# Patient Record
Sex: MALE | Race: WHITE | Employment: OTHER | URBAN - METROPOLITAN AREA
[De-identification: names, ages, dates, MRNs, and addresses within clinical notes are randomized per-mention and may not be internally consistent; named-entity substitution may affect disease eponyms.]

---

## 2021-04-09 ENCOUNTER — HOSPITAL ENCOUNTER (OUTPATIENT)
Dept: ULTRASOUND IMAGING | Age: 86
Discharge: HOME | End: 2021-04-11
Payer: MEDICARE

## 2021-04-09 DIAGNOSIS — N18.4 CHRONIC RENAL DISEASE, STAGE IV (HCC): ICD-10-CM

## 2021-04-09 DIAGNOSIS — G45.9 TIA (TRANSIENT ISCHEMIC ATTACK): ICD-10-CM

## 2021-04-09 PROCEDURE — 93880 EXTRACRANIAL BILAT STUDY: CPT

## 2021-04-09 PROCEDURE — 76775 US EXAM ABDO BACK WALL LIM: CPT

## 2021-10-27 ENCOUNTER — APPOINTMENT (OUTPATIENT)
Dept: CT IMAGING | Age: 86
DRG: 291 | End: 2021-10-27
Payer: MEDICARE

## 2021-10-27 ENCOUNTER — HOSPITAL ENCOUNTER (INPATIENT)
Age: 86
LOS: 7 days | Discharge: NURSING, REHAB OR LONG-TERM CARE | DRG: 291 | End: 2021-11-03
Attending: INTERNAL MEDICINE | Admitting: INTERNAL MEDICINE
Payer: MEDICARE

## 2021-10-27 DIAGNOSIS — I50.9 CONGESTIVE HEART FAILURE, UNSPECIFIED HF CHRONICITY, UNSPECIFIED HEART FAILURE TYPE (HCC): Primary | ICD-10-CM

## 2021-10-27 PROBLEM — I50.43 CHF (CONGESTIVE HEART FAILURE), NYHA CLASS I, ACUTE ON CHRONIC, COMBINED (HCC): Status: ACTIVE | Noted: 2021-10-27

## 2021-10-27 LAB
ALBUMIN SERPL-MCNC: 3.3 G/DL (ref 3.5–4.6)
ALP BLD-CCNC: 113 U/L (ref 35–104)
ALT SERPL-CCNC: 10 U/L (ref 0–41)
ANION GAP SERPL CALCULATED.3IONS-SCNC: 9 MEQ/L (ref 9–15)
AST SERPL-CCNC: 15 U/L (ref 0–40)
BASOPHILS ABSOLUTE: 0 K/UL (ref 0–0.2)
BASOPHILS RELATIVE PERCENT: 0.6 %
BILIRUB SERPL-MCNC: 0.7 MG/DL (ref 0.2–0.7)
BUN BLDV-MCNC: 32 MG/DL (ref 8–23)
CALCIUM SERPL-MCNC: 8.6 MG/DL (ref 8.5–9.9)
CHLORIDE BLD-SCNC: 108 MEQ/L (ref 95–107)
CHP ED QC CHECK: YES
CO2: 25 MEQ/L (ref 20–31)
CREAT SERPL-MCNC: 3.45 MG/DL (ref 0.7–1.2)
EOSINOPHILS ABSOLUTE: 0.1 K/UL (ref 0–0.7)
EOSINOPHILS RELATIVE PERCENT: 2.3 %
GFR AFRICAN AMERICAN: 20.6
GFR NON-AFRICAN AMERICAN: 17
GLOBULIN: 2.9 G/DL (ref 2.3–3.5)
GLUCOSE BLD-MCNC: 165 MG/DL
GLUCOSE BLD-MCNC: 165 MG/DL (ref 60–115)
GLUCOSE BLD-MCNC: 171 MG/DL (ref 70–99)
GLUCOSE BLD-MCNC: 190 MG/DL (ref 60–115)
HBA1C MFR BLD: 6.4 % (ref 4.8–5.9)
HCT VFR BLD CALC: 31.2 % (ref 42–52)
HEMOGLOBIN: 10.9 G/DL (ref 14–18)
LYMPHOCYTES ABSOLUTE: 1 K/UL (ref 1–4.8)
LYMPHOCYTES RELATIVE PERCENT: 16.5 %
MAGNESIUM: 2 MG/DL (ref 1.7–2.4)
MAGNESIUM: 2.1 MG/DL (ref 1.7–2.4)
MCH RBC QN AUTO: 32.8 PG (ref 27–31.3)
MCHC RBC AUTO-ENTMCNC: 35.1 % (ref 33–37)
MCV RBC AUTO: 93.3 FL (ref 80–100)
MONOCYTES ABSOLUTE: 0.5 K/UL (ref 0.2–0.8)
MONOCYTES RELATIVE PERCENT: 7.4 %
NEUTROPHILS ABSOLUTE: 4.6 K/UL (ref 1.4–6.5)
NEUTROPHILS RELATIVE PERCENT: 73.2 %
PDW BLD-RTO: 14.8 % (ref 11.5–14.5)
PERFORMED ON: ABNORMAL
PERFORMED ON: ABNORMAL
PLATELET # BLD: 206 K/UL (ref 130–400)
POTASSIUM SERPL-SCNC: 4.3 MEQ/L (ref 3.4–4.9)
PRO-BNP: NORMAL PG/ML
RBC # BLD: 3.34 M/UL (ref 4.7–6.1)
SARS-COV-2, NAAT: NOT DETECTED
SODIUM BLD-SCNC: 142 MEQ/L (ref 135–144)
TOTAL PROTEIN: 6.2 G/DL (ref 6.3–8)
TROPONIN: 0.04 NG/ML (ref 0–0.01)
TROPONIN: 0.05 NG/ML (ref 0–0.01)
WBC # BLD: 6.3 K/UL (ref 4.8–10.8)

## 2021-10-27 PROCEDURE — 87635 SARS-COV-2 COVID-19 AMP PRB: CPT

## 2021-10-27 PROCEDURE — 2580000003 HC RX 258: Performed by: INTERNAL MEDICINE

## 2021-10-27 PROCEDURE — 2060000000 HC ICU INTERMEDIATE R&B

## 2021-10-27 PROCEDURE — 85025 COMPLETE CBC W/AUTO DIFF WBC: CPT

## 2021-10-27 PROCEDURE — 93005 ELECTROCARDIOGRAM TRACING: CPT | Performed by: NURSE PRACTITIONER

## 2021-10-27 PROCEDURE — 83036 HEMOGLOBIN GLYCOSYLATED A1C: CPT

## 2021-10-27 PROCEDURE — 83735 ASSAY OF MAGNESIUM: CPT

## 2021-10-27 PROCEDURE — 84484 ASSAY OF TROPONIN QUANT: CPT

## 2021-10-27 PROCEDURE — 99285 EMERGENCY DEPT VISIT HI MDM: CPT

## 2021-10-27 PROCEDURE — 6370000000 HC RX 637 (ALT 250 FOR IP): Performed by: INTERNAL MEDICINE

## 2021-10-27 PROCEDURE — 96374 THER/PROPH/DIAG INJ IV PUSH: CPT

## 2021-10-27 PROCEDURE — 96375 TX/PRO/DX INJ NEW DRUG ADDON: CPT

## 2021-10-27 PROCEDURE — 6360000002 HC RX W HCPCS: Performed by: INTERNAL MEDICINE

## 2021-10-27 PROCEDURE — 71250 CT THORAX DX C-: CPT

## 2021-10-27 PROCEDURE — 36415 COLL VENOUS BLD VENIPUNCTURE: CPT

## 2021-10-27 PROCEDURE — 99223 1ST HOSP IP/OBS HIGH 75: CPT | Performed by: INTERNAL MEDICINE

## 2021-10-27 PROCEDURE — 6360000002 HC RX W HCPCS: Performed by: NURSE PRACTITIONER

## 2021-10-27 PROCEDURE — 83880 ASSAY OF NATRIURETIC PEPTIDE: CPT

## 2021-10-27 PROCEDURE — 6370000000 HC RX 637 (ALT 250 FOR IP): Performed by: NURSE PRACTITIONER

## 2021-10-27 PROCEDURE — 80053 COMPREHEN METABOLIC PANEL: CPT

## 2021-10-27 RX ORDER — SODIUM CHLORIDE 0.9 % (FLUSH) 0.9 %
5-40 SYRINGE (ML) INJECTION EVERY 12 HOURS SCHEDULED
Status: DISCONTINUED | OUTPATIENT
Start: 2021-10-27 | End: 2021-11-03 | Stop reason: HOSPADM

## 2021-10-27 RX ORDER — INSULIN GLARGINE 100 [IU]/ML
0.15 INJECTION, SOLUTION SUBCUTANEOUS NIGHTLY
Status: DISCONTINUED | OUTPATIENT
Start: 2021-10-27 | End: 2021-11-03 | Stop reason: HOSPADM

## 2021-10-27 RX ORDER — SODIUM CHLORIDE 0.9 % (FLUSH) 0.9 %
5-40 SYRINGE (ML) INJECTION PRN
Status: DISCONTINUED | OUTPATIENT
Start: 2021-10-27 | End: 2021-11-03 | Stop reason: HOSPADM

## 2021-10-27 RX ORDER — ASPIRIN 81 MG/1
81 TABLET, CHEWABLE ORAL DAILY
Status: DISCONTINUED | OUTPATIENT
Start: 2021-10-27 | End: 2021-10-31

## 2021-10-27 RX ORDER — ONDANSETRON 2 MG/ML
4 INJECTION INTRAMUSCULAR; INTRAVENOUS EVERY 6 HOURS PRN
Status: DISCONTINUED | OUTPATIENT
Start: 2021-10-27 | End: 2021-11-03 | Stop reason: HOSPADM

## 2021-10-27 RX ORDER — SODIUM CHLORIDE 9 MG/ML
25 INJECTION, SOLUTION INTRAVENOUS PRN
Status: DISCONTINUED | OUTPATIENT
Start: 2021-10-27 | End: 2021-11-03 | Stop reason: HOSPADM

## 2021-10-27 RX ORDER — ASPIRIN 81 MG/1
81 TABLET, CHEWABLE ORAL ONCE
Status: COMPLETED | OUTPATIENT
Start: 2021-10-27 | End: 2021-10-27

## 2021-10-27 RX ORDER — ONDANSETRON 4 MG/1
4 TABLET, ORALLY DISINTEGRATING ORAL EVERY 8 HOURS PRN
Status: DISCONTINUED | OUTPATIENT
Start: 2021-10-27 | End: 2021-11-03 | Stop reason: HOSPADM

## 2021-10-27 RX ORDER — FUROSEMIDE 10 MG/ML
60 INJECTION INTRAMUSCULAR; INTRAVENOUS ONCE
Status: COMPLETED | OUTPATIENT
Start: 2021-10-27 | End: 2021-10-27

## 2021-10-27 RX ORDER — DEXTROSE MONOHYDRATE 25 G/50ML
12.5 INJECTION, SOLUTION INTRAVENOUS PRN
Status: DISCONTINUED | OUTPATIENT
Start: 2021-10-27 | End: 2021-11-03 | Stop reason: HOSPADM

## 2021-10-27 RX ORDER — HYDRALAZINE HYDROCHLORIDE 20 MG/ML
10 INJECTION INTRAMUSCULAR; INTRAVENOUS ONCE
Status: COMPLETED | OUTPATIENT
Start: 2021-10-27 | End: 2021-10-27

## 2021-10-27 RX ORDER — ATORVASTATIN CALCIUM 40 MG/1
40 TABLET, FILM COATED ORAL NIGHTLY
Status: DISCONTINUED | OUTPATIENT
Start: 2021-10-27 | End: 2021-10-30

## 2021-10-27 RX ORDER — ASPIRIN 81 MG/1
324 TABLET, CHEWABLE ORAL ONCE
Status: DISCONTINUED | OUTPATIENT
Start: 2021-10-27 | End: 2021-10-27

## 2021-10-27 RX ORDER — NICOTINE POLACRILEX 4 MG
15 LOZENGE BUCCAL PRN
Status: DISCONTINUED | OUTPATIENT
Start: 2021-10-27 | End: 2021-11-03 | Stop reason: HOSPADM

## 2021-10-27 RX ORDER — HYDRALAZINE HYDROCHLORIDE 50 MG/1
50 TABLET, FILM COATED ORAL EVERY 12 HOURS SCHEDULED
Status: DISCONTINUED | OUTPATIENT
Start: 2021-10-27 | End: 2021-10-28

## 2021-10-27 RX ORDER — HEPARIN SODIUM 5000 [USP'U]/ML
5000 INJECTION, SOLUTION INTRAVENOUS; SUBCUTANEOUS EVERY 8 HOURS SCHEDULED
Status: DISCONTINUED | OUTPATIENT
Start: 2021-10-27 | End: 2021-10-31

## 2021-10-27 RX ORDER — FUROSEMIDE 10 MG/ML
40 INJECTION INTRAMUSCULAR; INTRAVENOUS EVERY 12 HOURS
Status: DISCONTINUED | OUTPATIENT
Start: 2021-10-27 | End: 2021-10-29

## 2021-10-27 RX ORDER — METOPROLOL TARTRATE 50 MG/1
50 TABLET, FILM COATED ORAL ONCE
Status: COMPLETED | OUTPATIENT
Start: 2021-10-27 | End: 2021-10-27

## 2021-10-27 RX ORDER — HEPARIN SODIUM 5000 [USP'U]/ML
5000 INJECTION, SOLUTION INTRAVENOUS; SUBCUTANEOUS EVERY 8 HOURS SCHEDULED
Status: DISCONTINUED | OUTPATIENT
Start: 2021-10-27 | End: 2021-10-27

## 2021-10-27 RX ORDER — DEXTROSE MONOHYDRATE 50 MG/ML
100 INJECTION, SOLUTION INTRAVENOUS PRN
Status: DISCONTINUED | OUTPATIENT
Start: 2021-10-27 | End: 2021-11-03 | Stop reason: HOSPADM

## 2021-10-27 RX ORDER — ACETAMINOPHEN 325 MG/1
650 TABLET ORAL EVERY 4 HOURS PRN
Status: DISCONTINUED | OUTPATIENT
Start: 2021-10-27 | End: 2021-11-03 | Stop reason: HOSPADM

## 2021-10-27 RX ORDER — CARVEDILOL 3.12 MG/1
3.12 TABLET ORAL 2 TIMES DAILY
Status: DISCONTINUED | OUTPATIENT
Start: 2021-10-27 | End: 2021-10-29

## 2021-10-27 RX ADMIN — ASPIRIN 81 MG: 81 TABLET, CHEWABLE ORAL at 18:34

## 2021-10-27 RX ADMIN — INSULIN GLARGINE 15 UNITS: 100 INJECTION, SOLUTION SUBCUTANEOUS at 21:52

## 2021-10-27 RX ADMIN — HYDRALAZINE HYDROCHLORIDE 50 MG: 50 TABLET, FILM COATED ORAL at 21:52

## 2021-10-27 RX ADMIN — NITROGLYCERIN 0.5 INCH: 20 OINTMENT TOPICAL at 23:55

## 2021-10-27 RX ADMIN — HEPARIN SODIUM 5000 UNITS: 5000 INJECTION INTRAVENOUS; SUBCUTANEOUS at 18:34

## 2021-10-27 RX ADMIN — FUROSEMIDE 60 MG: 10 INJECTION, SOLUTION INTRAMUSCULAR; INTRAVENOUS at 16:42

## 2021-10-27 RX ADMIN — METOPROLOL TARTRATE 50 MG: 50 TABLET, FILM COATED ORAL at 21:52

## 2021-10-27 RX ADMIN — HYDRALAZINE HYDROCHLORIDE 10 MG: 20 INJECTION INTRAMUSCULAR; INTRAVENOUS at 16:39

## 2021-10-27 RX ADMIN — Medication 10 ML: at 21:53

## 2021-10-27 RX ADMIN — ATORVASTATIN CALCIUM 40 MG: 40 TABLET, FILM COATED ORAL at 21:52

## 2021-10-27 RX ADMIN — ASPIRIN 81 MG: 81 TABLET, CHEWABLE ORAL at 21:52

## 2021-10-27 RX ADMIN — CARVEDILOL 3.12 MG: 3.12 TABLET, FILM COATED ORAL at 21:52

## 2021-10-27 RX ADMIN — NITROGLYCERIN 0.5 INCH: 20 OINTMENT TOPICAL at 19:12

## 2021-10-27 ASSESSMENT — ENCOUNTER SYMPTOMS
WHEEZING: 0
BLOOD IN STOOL: 0
CHEST TIGHTNESS: 0
GASTROINTESTINAL NEGATIVE: 1
TROUBLE SWALLOWING: 0
ABDOMINAL PAIN: 0
COLOR CHANGE: 0
CHEST TIGHTNESS: 1
BACK PAIN: 0
CONSTIPATION: 0
VOMITING: 0
DIARRHEA: 0
EYE REDNESS: 0
RHINORRHEA: 0
SORE THROAT: 0
STRIDOR: 0
EYE PAIN: 0
COUGH: 0
EYES NEGATIVE: 1
SHORTNESS OF BREATH: 1
NAUSEA: 0
EYE DISCHARGE: 0
COUGH: 1

## 2021-10-27 ASSESSMENT — PAIN SCALES - GENERAL: PAINLEVEL_OUTOF10: 0

## 2021-10-27 NOTE — H&P
History and Physical  Patient: [facility]ies[facility]  Unit/Bed: 03/03  YOB: 1935  MRN: [id number]  Acct: [de-identified]   Admitting Diagnosis: CHF (congestive heart failure), NYHA class I, acute on chronic, combined ([facility] [I50.43]  Admit Date:  10/27/2021  Hospital Day: 0      Chief Complaint: SOB      History of Present Illness:  SOB x 2 months with progressive worsening LE edea and breathing. difficult to sleep. Denies CP. Feels weak. He is in CHF. He had CABG x5 in [place]. He has recently seen Dr. Abigail Weber from [facility]    His family is in the ER. They sate he has been compliant with his meds. He has not been eating much. He has been more sedentary lately    He was hypoxic initially 89% per ER. He has DM and CKD    EKG:  PMHx:  History reviewed. No pertinent past medical history. PSHx:  History reviewed. No pertinent surgical history. Social Hx:  Social History     Socioeconomic History    Marital status:      Spouse name: None    Number of children: None    Years of education: None    Highest education level: None   Occupational History    None   Tobacco Use    Smoking status: Never Smoker    Smokeless tobacco: Never Used   Substance and Sexual Activity    Alcohol use: Not Currently    Drug use: Not Currently    Sexual activity: Not Currently   Other Topics Concern    None   Social History Narrative    None     Social Determinants of Health     Financial Resource Strain:     Difficulty of Paying Living Expenses:    Food Insecurity:     Worried About Running Out of Food in the Last Year:     Ran Out of Food in the Last Year:    Transportation Needs:     Lack of Transportation (Medical):      Lack of Transportation (Non-Medical):    Physical Activity:     Days of Exercise per Week:     Minutes of Exercise per Session:    Stress:     Feeling of Stress :    Social Connections:     Frequency of Communication with Friends and Family:     Frequency of Social Gatherings with Friends and Family:     Attends Scientology Services:     Active Member of Clubs or Organizations:     Attends Club or Organization Meetings:     Marital Status:    Intimate Partner Violence:     Fear of Current or Ex-Partner:     Emotionally Abused:     Physically Abused:     Sexually Abused:        Family Hx:  History reviewed. No pertinent family history. No Known Allergies    Current Facility-Administered Medications   Medication Dose Route Frequency Provider Last Rate Last Admin    furosemide (LASIX) injection 40 mg  40 mg IntraVENous Q12H Jazmyne Henry MD        aspirin chewable tablet 81 mg  81 mg Oral Daily Jazmyne Henry MD        carvedilol (COREG) tablet 3.125 mg  3.125 mg Oral BID Jazmyne Henry MD        atorvastatin (LIPITOR) tablet 40 mg  40 mg Oral Nightly Jazmyne Henry MD        hydrALAZINE (APRESOLINE) tablet 50 mg  50 mg Oral 2 times per day Jazmyne Henry MD        nitroglycerin (NITRO-BID) 2 % ointment 0.5 inch  0.5 inch Topical 4 times per day Jazmyne Henry MD         No current outpatient medications on file. Review of Systems:   Review of Systems   Constitutional: Negative. Negative for diaphoresis and fatigue. HENT: Negative. Eyes: Negative. Respiratory: Positive for shortness of breath. Negative for cough, chest tightness, wheezing and stridor. Cardiovascular: Positive for leg swelling. Negative for chest pain and palpitations. Gastrointestinal: Negative. Negative for blood in stool and nausea. Genitourinary: Negative. Musculoskeletal: Negative. Skin: Negative. Neurological: Negative. Negative for dizziness, syncope, weakness and light-headedness. Hematological: Negative. Psychiatric/Behavioral: Negative. Physical Examination:    BP (!) 166/89   Pulse 71   Temp 98.9 °F (37.2 °C)   Resp 17   Ht 5' 9\" (1.753 m)   Wt 224 lb (101.6 kg)   SpO2 95%   BMI 33.08 kg/m²    Physical Exam   Constitutional: No distress.  He appears chronically ill and acutely ill. HENT:   Normal cephalic and Atraumatic   Eyes: Pupils are equal, round, and reactive to light. Neck: Thyroid normal. No JVD present. No neck adenopathy. No thyromegaly present. Cardiovascular: Normal rate, regular rhythm, intact distal pulses and normal pulses. Murmur heard. Pulmonary/Chest: Effort normal. He has no wheezes. He has bilateral rales to the midchest. He exhibits no tenderness. Abdominal: Soft. Bowel sounds are normal. There is no abdominal tenderness. Musculoskeletal:         General: Edema (1-2+) present. No tenderness. Normal range of motion. Cervical back: Normal range of motion and neck supple. Neurological: He is alert and oriented to person, place, and time. Skin: Skin is warm. No cyanosis. Nails show no clubbing.          LABS:  CBC:   Lab Results   Component Value Date    WBC 6.3 10/27/2021    RBC 3.34 10/27/2021    HGB 10.9 10/27/2021    HCT 31.2 10/27/2021    MCV 93.3 10/27/2021    MCH 32.8 10/27/2021    MCHC 35.1 10/27/2021    RDW 14.8 10/27/2021     10/27/2021     CBC with Differential:    Lab Results   Component Value Date    WBC 6.3 10/27/2021    RBC 3.34 10/27/2021    HGB 10.9 10/27/2021    HCT 31.2 10/27/2021     10/27/2021    MCV 93.3 10/27/2021    MCH 32.8 10/27/2021    MCHC 35.1 10/27/2021    RDW 14.8 10/27/2021    LYMPHOPCT 16.5 10/27/2021    MONOPCT 7.4 10/27/2021    BASOPCT 0.6 10/27/2021    MONOSABS 0.5 10/27/2021    LYMPHSABS 1.0 10/27/2021    EOSABS 0.1 10/27/2021    BASOSABS 0.0 10/27/2021     CMP:    Lab Results   Component Value Date     10/27/2021    K 4.3 10/27/2021     10/27/2021    CO2 25 10/27/2021    BUN 32 10/27/2021    CREATININE 3.45 10/27/2021    GFRAA 20.6 10/27/2021    LABGLOM 17.0 10/27/2021    GLUCOSE 171 10/27/2021    GLUCOSE 228 06/15/2021    PROT 6.2 10/27/2021    LABALBU 3.3 10/27/2021    LABALBU 3.3 06/15/2021    CALCIUM 8.6 10/27/2021    BILITOT 0.7 10/27/2021    ALKPHOS 113 10/27/2021    AST 15 10/27/2021    ALT 10 10/27/2021     BMP:    Lab Results   Component Value Date     10/27/2021    K 4.3 10/27/2021     10/27/2021    CO2 25 10/27/2021    BUN 32 10/27/2021    LABALBU 3.3 10/27/2021    LABALBU 3.3 06/15/2021    CREATININE 3.45 10/27/2021    CALCIUM 8.6 10/27/2021    GFRAA 20.6 10/27/2021    LABGLOM 17.0 10/27/2021    GLUCOSE 171 10/27/2021    GLUCOSE 228 06/15/2021     Magnesium:    Lab Results   Component Value Date    MG 2.1 10/27/2021     Troponin:    Lab Results   Component Value Date    TROPONINI 0.049 10/27/2021       Active Hospital Problems    Diagnosis Date Noted    CHF (congestive heart failure), NYHA class I, acute on chronic, combined ([facility] [I50.43] 10/27/2021     Priority: Low        Assessment/Plan:  1. Acute on Chronic Decompensted CHF - appears Diastolic. Will start iv Lasix. strit I/Os and follow labs. Need Echo  2. CABG-x5 - ASA BB Statin. 3. DM- will consult IM for assistance.   4. HPL - high dose Statin     Electronically signed by Lyly Dia MD on 10/27/2021 at 5:16 PM

## 2021-10-27 NOTE — ED PROVIDER NOTES
[address] [facility] ED  EMERGENCY DEPARTMENT ENCOUNTER      Pt Name: Karina Herron  MRN: [id number]  [place] 1935  Date of evaluation: 10/27/2021  Provider: PRINCESS Bentley CNP    CHIEF COMPLAINT       Chief Complaint   Patient presents with    Shortness of Breath         HISTORY OF PRESENT ILLNESS   (Location/Symptom, Timing/Onset,Context/Setting, Quality, Duration, Modifying Factors, Severity)  Note limiting factors. Karina Herron is a 81 y.o. male who presents to the emergency department with past medical history of high blood pressure, hyperlipidemia, and mild heart failure for a chief complaint of shortness of breath. Patient was at Dr. Marcella Carpenter office and sent to ER due to further evaluation and management of the shortness of breath. Patient has been short of breath for 2 weeks per his statement. He struggles to take a deep breath when he lays down. He does not have a cardiologist and he feels like he is filling up with fluid all over. He has been coughing with some clear sputum. He does not wear oxygen at home and was 89% in triage. Nursing Notes were reviewed. REVIEW OF SYSTEMS    (2-9 systems for level 4, 10 or more for level 5)     Review of Systems   Constitutional: Negative for activity change, appetite change, fatigue and fever. HENT: Negative for congestion, ear pain, rhinorrhea, sore throat and trouble swallowing. Eyes: Negative for pain, discharge and redness. Respiratory: Positive for cough, chest tightness and shortness of breath. Negative for wheezing. Cardiovascular: Negative for chest pain and palpitations. Gastrointestinal: Negative for abdominal pain, blood in stool, constipation, diarrhea, nausea and vomiting. Endocrine: Negative for polydipsia and polyuria. Genitourinary: Negative for decreased urine volume, dysuria, flank pain and hematuria. Musculoskeletal: Negative for arthralgias, back pain and myalgias.    Skin: Negative for color change, rash and wound. Neurological: Negative for dizziness, syncope, weakness, light-headedness and headaches. Psychiatric/Behavioral: Negative for behavioral problems. All other systems reviewed and are negative. Except as noted above the remainder of the review of systems was reviewed and negative. PAST MEDICAL HISTORY   History reviewed. No pertinent past medical history. History reviewed. No pertinent surgical history. Social History     Socioeconomic History    Marital status:      Spouse name: None    Number of children: None    Years of education: None    Highest education level: None   Occupational History    None   Tobacco Use    Smoking status: Never Smoker    Smokeless tobacco: Never Used   Substance and Sexual Activity    Alcohol use: Not Currently    Drug use: Not Currently    Sexual activity: Not Currently   Other Topics Concern    None   Social History Narrative    None     Social Determinants of Health     Financial Resource Strain:     Difficulty of Paying Living Expenses:    Food Insecurity:     Worried About Running Out of Food in the Last Year:     Ran Out of Food in the Last Year:    Transportation Needs:     Lack of Transportation (Medical):      Lack of Transportation (Non-Medical):    Physical Activity:     Days of Exercise per Week:     Minutes of Exercise per Session:    Stress:     Feeling of Stress :    Social Connections:     Frequency of Communication with Friends and Family:     Frequency of Social Gatherings with Friends and Family:     Attends Islam Services:     Active Member of Clubs or Organizations:     Attends Club or Organization Meetings:     Marital Status:    Intimate Partner Violence:     Fear of Current or Ex-Partner:     Emotionally Abused:     Physically Abused:     Sexually Abused:        SCREENINGS             PHYSICAL EXAM    (up to 7 for level 4, 8 or more for level 5)     ED Triage Vitals [10/27/21 1329] BP Temp Temp src Pulse Resp SpO2 Height Weight   117/84 98.9 °F (37.2 °C) -- 75 19 98 % 5' 9\" (1.753 m) 224 lb (101.6 kg)       Physical Exam  Vitals and nursing note reviewed. Constitutional:       General: He is not in acute distress. Appearance: He is well-developed. He is not diaphoretic. HENT:      Head: Normocephalic and atraumatic. Nose: Nose normal.   Eyes:      Conjunctiva/sclera: Conjunctivae normal.      Pupils: Pupils are equal, round, and reactive to light. Cardiovascular:      Rate and Rhythm: Normal rate and regular rhythm. Heart sounds: Normal heart sounds. Pulmonary:      Effort: Tachypnea present. Breath sounds: Examination of the right-upper field reveals rales. Examination of the left-upper field reveals rales. Rales present. Abdominal:      General: Bowel sounds are normal.      Palpations: Abdomen is soft. Tenderness: There is no abdominal tenderness. Musculoskeletal:      Cervical back: Normal range of motion and neck supple. Skin:     General: Skin is warm and dry. Capillary Refill: Capillary refill takes less than 2 seconds. Findings: No rash. Neurological:      Mental Status: He is alert and oriented to person, place, and time. Cranial Nerves: No cranial nerve deficit. Psychiatric:         Behavior: Behavior normal.         RESULTS     EKG: All EKG's are interpreted by the Emergency Department Physician who either signs or Co-signsthis chart in the absence of a cardiologist.        RADIOLOGY:   Non-plain filmimages such as CT, Ultrasound and MRI are read by the radiologist. Plain radiographic images are visualized and preliminarily interpreted by the emergency physician with the below findings:        Interpretation per the Radiologist below, if available at the time ofthis note:    CT CHEST WO CONTRAST   Final Result      1. CHF   2. Bilateral pleural effusions with atelectasis and/or infiltrate   3. Enlarged main pulmonary artery. Reformatted pulmonary arterial hypertension         All CT scans at this facility use dose modulation, iterative reconstruction, and/or weight based dosing when appropriate to reduce radiation dose to as low as reasonably achievable. ED BEDSIDE ULTRASOUND:   Performed by ED Physician - none    LABS:  Labs Reviewed   COMPREHENSIVE METABOLIC PANEL - Abnormal; Notable for the following components:       Result Value    Chloride 108 (*)     Glucose 171 (*)     BUN 32 (*)     CREATININE 3.45 (*)     GFR Non- 17.0 (*)     GFR  20.6 (*)     Total Protein 6.2 (*)     Albumin 3.3 (*)     Alkaline Phosphatase 113 (*)     All other components within normal limits   CBC WITH AUTO DIFFERENTIAL - Abnormal; Notable for the following components:    RBC 3.34 (*)     Hemoglobin 10.9 (*)     Hematocrit 31.2 (*)     MCH 32.8 (*)     RDW 14.8 (*)     All other components within normal limits   TROPONIN - Abnormal; Notable for the following components:    Troponin 0.049 (*)     All other components within normal limits    Narrative:     CALL  [facility]  [facility] tel. [phone],  Trop results called to and read back by Alejandra Hernandez, 10/27/2021 15:46, by  Oniel Dial   COVID-19, RAPID   MAGNESIUM   BRAIN NATRIURETIC PEPTIDE    Narrative:     Gely Virk  [facility] tel. [phone],  Trop results called to and read back by Alejandra Hernandez, 10/27/2021 15:46, by  Oniel Dial   MAGNESIUM   TROPONIN   TROPONIN   MAGNESIUM       All other labs were within normal range or not returned as of this dictation.     EMERGENCY DEPARTMENT COURSE and DIFFERENTIAL DIAGNOSIS/MDM:   Vitals:    Vitals:    10/27/21 1329 10/27/21 1446 10/27/21 1630 10/27/21 1707   BP: 117/84 (!) 171/78 (!) 182/74 (!) 166/89   Pulse: 75 78 67 71   Resp: 19 22 19 17   Temp: 98.9 °F (37.2 °C)      SpO2: 98% 94% 95% 95%   Weight: 224 lb (101.6 kg)      Height: 5' 9\" (1.753 m)               MDM   Patient is an 78-year-old male presenting to the ER with a chief

## 2021-10-27 NOTE — ED TRIAGE NOTES
Pt alert and calm pt resp even and nonlaboured pt was placed on 3liters In triage for oxygen sat 89%. Pt denies pain the patient able to follow all commands.  Pt states he was sent from dr office Dr Srikanth Mancera for further assessment

## 2021-10-27 NOTE — CONSULTS
Consult Note    Reason for Consult:  DM type II    Requesting Physician:  Pavel Barajas MD    HISTORY OF PRESENT ILLNESS:    The patient is a 81 y.o. male  presents with shortness of breath x2 months and new on set hypoxia admitted under the cardiology service for CHF. Reports been off furosemide for at least one month per his neurologist. Shruthi Olivera care at [facility]. Has pmhx of CVA, CKD stage IV, HTN, and DM type II. We are consulted to assist with management of DM type II. PMHX: CVA, CKD stage IV, HTN, CAD with CABG x5,DM type II    History reviewed. No pertinent surgical history. Prior to admission medications: HCTZ, lisinopril, amlodipine, januvia, glimpiride, metformin, statin, metoprolol    Scheduled Meds:   furosemide  40 mg IntraVENous Q12H    aspirin  81 mg Oral Daily    carvedilol  3.125 mg Oral BID    atorvastatin  40 mg Oral Nightly    hydrALAZINE  50 mg Oral 2 times per day    nitroglycerin  0.5 inch Topical 4 times per day    heparin (porcine)  5,000 Units SubCUTAneous 3 times per day     Continuous Infusions:  PRN Meds:    No Known Allergies    Social History     Socioeconomic History    Marital status:      Spouse name: Not on file    Number of children: Not on file    Years of education: Not on file    Highest education level: Not on file   Occupational History    Not on file   Tobacco Use    Smoking status: Never Smoker    Smokeless tobacco: Never Used   Substance and Sexual Activity    Alcohol use: Not Currently    Drug use: Not Currently    Sexual activity: Not Currently   Other Topics Concern    Not on file   Social History Narrative    Not on file     Social Determinants of Health     Financial Resource Strain:     Difficulty of Paying Living Expenses:    Food Insecurity:     Worried About Running Out of Food in the Last Year:     Ran Out of Food in the Last Year:    Transportation Needs:     Lack of Transportation (Medical):      Lack of Transportation (Non-Medical):    Physical Activity:     Days of Exercise per Week:     Minutes of Exercise per Session:    Stress:     Feeling of Stress :    Social Connections:     Frequency of Communication with Friends and Family:     Frequency of Social Gatherings with Friends and Family:     Attends Restorationist Services:     Active Member of Clubs or Organizations:     Attends Club or Organization Meetings:     Marital Status:    Intimate Partner Violence:     Fear of Current or Ex-Partner:     Emotionally Abused:     Physically Abused:     Sexually Abused:        History reviewed. No pertinent family history. Review Of Systems:   CONSTITUTIONAL:  positive for  fatigue  EYES:  negative  HEENT:  negative  RESPIRATORY:  positive for  dyspnea and hypoxia  CARDIOVASCULAR:  positive for  dyspnea, orthopnea, fatigue, edema  GASTROINTESTINAL:  negative  MUSCULOSKELETAL:  positive for  muscle weakness  NEUROLOGICAL:  positive for coordination problems, gait problems and weakness  BEHAVIOR/PSYCH:  negative    Physical Exam:  Vitals:    10/27/21 1329 10/27/21 1446 10/27/21 1630 10/27/21 1707   BP: 117/84 (!) 171/78 (!) 182/74 (!) 166/89   Pulse: 75 78 67 71   Resp: 19 22 19 17   Temp: 98.9 °F (37.2 °C)      SpO2: 98% 94% 95% 95%   Weight: 224 lb (101.6 kg)      Height: 5' 9\" (1.753 m)          CONSTITUTIONAL:  awake, alert, in mild distress, and appears stated age  NECK:  supple, symmetrical, trachea midline  LUNGS:  Tachypneic, crackles present  CARDIOVASCULAR: Murmur present, RRR, LE edema present  ABDOMEN: Normal bowel sounds, soft, non-distended, non-tender  MUSCULOSKELETAL: Edema BLE. Generalized weakness  NEUROLOGIC:  Awake, alert, oriented to name, place and time.   C  SKIN:  no bruising or bleeding and normal skin color, texture, turgor    Labs:  Recent Results (from the past 24 hour(s))   Comprehensive Metabolic Panel    Collection Time: 10/27/21  2:00 PM   Result Value Ref Range    Sodium 142 135 - 144 mEq/L Potassium 4.3 3.4 - 4.9 mEq/L    Chloride 108 (H) 95 - 107 mEq/L    CO2 25 20 - 31 mEq/L    Anion Gap 9 9 - 15 mEq/L    Glucose 171 (H) 70 - 99 mg/dL    BUN 32 (H) 8 - 23 mg/dL    CREATININE 3.45 (H) 0.70 - 1.20 mg/dL    GFR Non-African American 17.0 (L) >60    GFR  20.6 (L) >60    Calcium 8.6 8.5 - 9.9 mg/dL    Total Protein 6.2 (L) 6.3 - 8.0 g/dL    Albumin 3.3 (L) 3.5 - 4.6 g/dL    Total Bilirubin 0.7 0.2 - 0.7 mg/dL    Alkaline Phosphatase 113 (H) 35 - 104 U/L    ALT 10 0 - 41 U/L    AST 15 0 - 40 U/L    Globulin 2.9 2.3 - 3.5 g/dL   CBC Auto Differential    Collection Time: 10/27/21  2:00 PM   Result Value Ref Range    WBC 6.3 4.8 - 10.8 K/uL    RBC 3.34 (L) 4.70 - 6.10 M/uL    Hemoglobin 10.9 (L) 14.0 - 18.0 g/dL    Hematocrit 31.2 (L) 42.0 - 52.0 %    MCV 93.3 80.0 - 100.0 fL    MCH 32.8 (H) 27.0 - 31.3 pg    MCHC 35.1 33.0 - 37.0 %    RDW 14.8 (H) 11.5 - 14.5 %    Platelets 145 755 - 930 K/uL    Neutrophils % 73.2 %    Lymphocytes % 16.5 %    Monocytes % 7.4 %    Eosinophils % 2.3 %    Basophils % 0.6 %    Neutrophils Absolute 4.6 1.4 - 6.5 K/uL    Lymphocytes Absolute 1.0 1.0 - 4.8 K/uL    Monocytes Absolute 0.5 0.2 - 0.8 K/uL    Eosinophils Absolute 0.1 0.0 - 0.7 K/uL    Basophils Absolute 0.0 0.0 - 0.2 K/uL   Magnesium    Collection Time: 10/27/21  2:00 PM   Result Value Ref Range    Magnesium 2.1 1.7 - 2.4 mg/dL   Troponin    Collection Time: 10/27/21  2:00 PM   Result Value Ref Range    Troponin 0.049 (HH) 0.000 - 0.010 ng/mL   Brain Natriuretic Peptide    Collection Time: 10/27/21  2:00 PM   Result Value Ref Range    Pro-BNP 24,001 pg/mL   EKG 12 Lead - Chest Pain    Collection Time: 10/27/21  2:07 PM   Result Value Ref Range    Ventricular Rate 67 BPM    Atrial Rate 67 BPM    P-R Interval 154 ms    QRS Duration 106 ms    Q-T Interval 478 ms    QTc Calculation (Bazett) 505 ms    P Axis 39 degrees    R Axis 32 degrees    T Axis 190 degrees   COVID-19, Rapid    Collection Time: 10/27/21 2:21 PM    Specimen: Nasopharyngeal Swab   Result Value Ref Range    SARS-CoV-2, NAAT Not Detected Not Detected     Assessment/Plan:  1. Acute on chronic decompensated CHF: Management per primary team  2. Acute hypoxic respiratory failure: Secondary to #1. Titrate oxygen to maintain sats >92%  3. CAD with CABG x5: on ASA, BB, and statin  4. Prolonged QTc: Goal K and Mg 4.0 and 2.0 respectively. Discontinued QT prolonging agents wherever possible. Telemetry. 5. SHANDRA on CKD stage IV concerning for cardiorenal syndrome: Diuresis per cardiology. Consult nephrology  6. DM type II: Review home medications. SSI, lantus, hypoglycemia protocol  7. Remote CVA: Would benefit from rehab stay due to weakness and debility  8.  Advanced care planning: Discussed code status patient and family wish for [facility]    Electronically signed by PRINCESS Ball NP on 10/27/21 at 5:45 PM EDT

## 2021-10-28 ENCOUNTER — APPOINTMENT (OUTPATIENT)
Dept: CT IMAGING | Age: 86
DRG: 291 | End: 2021-10-28
Payer: MEDICARE

## 2021-10-28 ENCOUNTER — APPOINTMENT (OUTPATIENT)
Dept: GENERAL RADIOLOGY | Age: 86
DRG: 291 | End: 2021-10-28
Payer: MEDICARE

## 2021-10-28 LAB
ANION GAP SERPL CALCULATED.3IONS-SCNC: 8 MEQ/L (ref 9–15)
BUN BLDV-MCNC: 32 MG/DL (ref 8–23)
CALCIUM SERPL-MCNC: 8.3 MG/DL (ref 8.5–9.9)
CHLORIDE BLD-SCNC: 107 MEQ/L (ref 95–107)
CO2: 28 MEQ/L (ref 20–31)
CREAT SERPL-MCNC: 3.74 MG/DL (ref 0.7–1.2)
EKG ATRIAL RATE: 67 BPM
EKG ATRIAL RATE: 69 BPM
EKG P AXIS: 28 DEGREES
EKG P AXIS: 39 DEGREES
EKG P-R INTERVAL: 154 MS
EKG P-R INTERVAL: 178 MS
EKG Q-T INTERVAL: 464 MS
EKG Q-T INTERVAL: 478 MS
EKG QRS DURATION: 106 MS
EKG QRS DURATION: 112 MS
EKG QTC CALCULATION (BAZETT): 497 MS
EKG QTC CALCULATION (BAZETT): 505 MS
EKG R AXIS: 32 DEGREES
EKG R AXIS: 6 DEGREES
EKG T AXIS: 190 DEGREES
EKG T AXIS: 224 DEGREES
EKG VENTRICULAR RATE: 67 BPM
EKG VENTRICULAR RATE: 69 BPM
GFR AFRICAN AMERICAN: 17
GFR AFRICAN AMERICAN: 18.7
GFR NON-AFRICAN AMERICAN: 14
GFR NON-AFRICAN AMERICAN: 15.5
GLUCOSE BLD-MCNC: 117 MG/DL (ref 70–99)
GLUCOSE BLD-MCNC: 128 MG/DL (ref 60–115)
GLUCOSE BLD-MCNC: 147 MG/DL (ref 60–115)
GLUCOSE BLD-MCNC: 155 MG/DL (ref 60–115)
GLUCOSE BLD-MCNC: 198 MG/DL (ref 60–115)
HCT VFR BLD CALC: 29.6 % (ref 42–52)
HEMOGLOBIN: 10.2 G/DL (ref 14–18)
LV EF: 45 %
LVEF MODALITY: NORMAL
MCH RBC QN AUTO: 32.9 PG (ref 27–31.3)
MCHC RBC AUTO-ENTMCNC: 34.6 % (ref 33–37)
MCV RBC AUTO: 95.3 FL (ref 80–100)
PDW BLD-RTO: 15.5 % (ref 11.5–14.5)
PERFORMED ON: ABNORMAL
PLATELET # BLD: 185 K/UL (ref 130–400)
POC CREATININE: 4 MG/DL (ref 0.8–1.3)
POC SAMPLE TYPE: ABNORMAL
POTASSIUM SERPL-SCNC: 4.2 MEQ/L (ref 3.4–4.9)
RBC # BLD: 3.11 M/UL (ref 4.7–6.1)
SODIUM BLD-SCNC: 143 MEQ/L (ref 135–144)
TROPONIN: 0.05 NG/ML (ref 0–0.01)
WBC # BLD: 5.7 K/UL (ref 4.8–10.8)

## 2021-10-28 PROCEDURE — 84484 ASSAY OF TROPONIN QUANT: CPT

## 2021-10-28 PROCEDURE — 2580000003 HC RX 258: Performed by: INTERNAL MEDICINE

## 2021-10-28 PROCEDURE — 97162 PT EVAL MOD COMPLEX 30 MIN: CPT

## 2021-10-28 PROCEDURE — 93005 ELECTROCARDIOGRAM TRACING: CPT | Performed by: INTERNAL MEDICINE

## 2021-10-28 PROCEDURE — 99233 SBSQ HOSP IP/OBS HIGH 50: CPT | Performed by: INTERNAL MEDICINE

## 2021-10-28 PROCEDURE — 2060000000 HC ICU INTERMEDIATE R&B

## 2021-10-28 PROCEDURE — 93010 ELECTROCARDIOGRAM REPORT: CPT | Performed by: INTERNAL MEDICINE

## 2021-10-28 PROCEDURE — 36415 COLL VENOUS BLD VENIPUNCTURE: CPT

## 2021-10-28 PROCEDURE — 99222 1ST HOSP IP/OBS MODERATE 55: CPT | Performed by: SPECIALIST

## 2021-10-28 PROCEDURE — 93306 TTE W/DOPPLER COMPLETE: CPT

## 2021-10-28 PROCEDURE — 2500000003 HC RX 250 WO HCPCS: Performed by: INTERNAL MEDICINE

## 2021-10-28 PROCEDURE — 80048 BASIC METABOLIC PNL TOTAL CA: CPT

## 2021-10-28 PROCEDURE — 6370000000 HC RX 637 (ALT 250 FOR IP): Performed by: NURSE PRACTITIONER

## 2021-10-28 PROCEDURE — 85027 COMPLETE CBC AUTOMATED: CPT

## 2021-10-28 PROCEDURE — 6370000000 HC RX 637 (ALT 250 FOR IP): Performed by: OTOLARYNGOLOGY

## 2021-10-28 PROCEDURE — 6360000002 HC RX W HCPCS: Performed by: INTERNAL MEDICINE

## 2021-10-28 PROCEDURE — 74230 X-RAY XM SWLNG FUNCJ C+: CPT

## 2021-10-28 PROCEDURE — 6370000000 HC RX 637 (ALT 250 FOR IP): Performed by: INTERNAL MEDICINE

## 2021-10-28 PROCEDURE — 70490 CT SOFT TISSUE NECK W/O DYE: CPT

## 2021-10-28 PROCEDURE — 92611 MOTION FLUOROSCOPY/SWALLOW: CPT

## 2021-10-28 PROCEDURE — 97535 SELF CARE MNGMENT TRAINING: CPT

## 2021-10-28 RX ORDER — LISINOPRIL AND HYDROCHLOROTHIAZIDE 12.5; 1 MG/1; MG/1
1 TABLET ORAL DAILY
Status: ON HOLD | COMMUNITY
End: 2021-11-03 | Stop reason: HOSPADM

## 2021-10-28 RX ORDER — LIDOCAINE HYDROCHLORIDE 40 MG/ML
SOLUTION TOPICAL ONCE
Status: COMPLETED | OUTPATIENT
Start: 2021-10-28 | End: 2021-10-28

## 2021-10-28 RX ORDER — NITROGLYCERIN 0.4 MG/1
0.4 TABLET SUBLINGUAL EVERY 5 MIN PRN
COMMUNITY

## 2021-10-28 RX ORDER — HYDRALAZINE HYDROCHLORIDE 100 MG/1
100 TABLET, FILM COATED ORAL EVERY 12 HOURS SCHEDULED
Status: DISCONTINUED | OUTPATIENT
Start: 2021-10-28 | End: 2021-11-03 | Stop reason: HOSPADM

## 2021-10-28 RX ORDER — METOPROLOL SUCCINATE 100 MG/1
100 TABLET, EXTENDED RELEASE ORAL DAILY
Status: ON HOLD | COMMUNITY
End: 2021-11-03 | Stop reason: HOSPADM

## 2021-10-28 RX ORDER — MULTIVIT-MIN/FA/LYCOPEN/LUTEIN .4-300-25
1 TABLET ORAL DAILY
Status: ON HOLD | COMMUNITY
End: 2021-11-28 | Stop reason: HOSPADM

## 2021-10-28 RX ORDER — AMLODIPINE BESYLATE AND ATORVASTATIN CALCIUM 10; 20 MG/1; MG/1
1 TABLET, FILM COATED ORAL DAILY
Status: ON HOLD | COMMUNITY
End: 2021-11-03 | Stop reason: HOSPADM

## 2021-10-28 RX ORDER — GLIMEPIRIDE 4 MG/1
4 TABLET ORAL
Status: ON HOLD | COMMUNITY
End: 2021-11-28 | Stop reason: HOSPADM

## 2021-10-28 RX ADMIN — BENZOCAINE AND MENTHOL 1 LOZENGE: 15; 3.6 LOZENGE ORAL at 12:46

## 2021-10-28 RX ADMIN — ATORVASTATIN CALCIUM 40 MG: 40 TABLET, FILM COATED ORAL at 20:36

## 2021-10-28 RX ADMIN — HYDRALAZINE HYDROCHLORIDE 100 MG: 100 TABLET, FILM COATED ORAL at 09:24

## 2021-10-28 RX ADMIN — NITROGLYCERIN 0.5 INCH: 20 OINTMENT TOPICAL at 16:36

## 2021-10-28 RX ADMIN — Medication 10 ML: at 16:41

## 2021-10-28 RX ADMIN — LIDOCAINE HYDROCHLORIDE: 40 SOLUTION TOPICAL at 13:30

## 2021-10-28 RX ADMIN — NITROGLYCERIN 0.5 INCH: 20 OINTMENT TOPICAL at 22:03

## 2021-10-28 RX ADMIN — HEPARIN SODIUM 5000 UNITS: 5000 INJECTION INTRAVENOUS; SUBCUTANEOUS at 16:32

## 2021-10-28 RX ADMIN — ASPIRIN 81 MG: 81 TABLET, CHEWABLE ORAL at 09:24

## 2021-10-28 RX ADMIN — FUROSEMIDE 40 MG: 10 INJECTION, SOLUTION INTRAMUSCULAR; INTRAVENOUS at 16:32

## 2021-10-28 RX ADMIN — Medication 10 ML: at 20:37

## 2021-10-28 RX ADMIN — HEPARIN SODIUM 5000 UNITS: 5000 INJECTION INTRAVENOUS; SUBCUTANEOUS at 22:03

## 2021-10-28 RX ADMIN — CARVEDILOL 3.12 MG: 3.12 TABLET, FILM COATED ORAL at 20:36

## 2021-10-28 RX ADMIN — HYDRALAZINE HYDROCHLORIDE 100 MG: 100 TABLET, FILM COATED ORAL at 20:36

## 2021-10-28 RX ADMIN — NITROGLYCERIN 0.5 INCH: 20 OINTMENT TOPICAL at 06:34

## 2021-10-28 RX ADMIN — CARVEDILOL 3.12 MG: 3.12 TABLET, FILM COATED ORAL at 09:24

## 2021-10-28 RX ADMIN — FUROSEMIDE 40 MG: 10 INJECTION, SOLUTION INTRAMUSCULAR; INTRAVENOUS at 06:34

## 2021-10-28 RX ADMIN — INSULIN GLARGINE 15 UNITS: 100 INJECTION, SOLUTION SUBCUTANEOUS at 20:37

## 2021-10-28 RX ADMIN — HEPARIN SODIUM 5000 UNITS: 5000 INJECTION INTRAVENOUS; SUBCUTANEOUS at 06:34

## 2021-10-28 RX ADMIN — BARIUM SULFATE 50 G: 0.81 POWDER, FOR SUSPENSION ORAL at 15:02

## 2021-10-28 ASSESSMENT — PAIN DESCRIPTION - LOCATION: LOCATION: THROAT

## 2021-10-28 ASSESSMENT — PAIN SCALES - GENERAL
PAINLEVEL_OUTOF10: 10
PAINLEVEL_OUTOF10: 0

## 2021-10-28 ASSESSMENT — PAIN DESCRIPTION - PAIN TYPE: TYPE: ACUTE PAIN

## 2021-10-28 ASSESSMENT — PAIN DESCRIPTION - DESCRIPTORS: DESCRIPTORS: ACHING

## 2021-10-28 ASSESSMENT — PAIN DESCRIPTION - FREQUENCY: FREQUENCY: CONTINUOUS

## 2021-10-28 ASSESSMENT — PAIN DESCRIPTION - ONSET: ONSET: ON-GOING

## 2021-10-28 NOTE — FLOWSHEET NOTE
2025-Pt to floor. Denies CP but reports his SOB is the same as it has been. On 2L NC (does NOT wear at home). /74 but reports he didn't take all of his home meds. 2105- Admission completed. /78. Pt reports his SOB started 3-4 weeks ago. Has 2+ pitting edema to BLE. Reports that started around the same time. Reports he fell in June. Falls precautions initiated. Reports he does not know his home meds but his daughter has a list of them. 2200-attempted to call elizabeth [facility]ton (per pt request) to review home meds. No answer. 2245-RN spoke with Pts daughter Hoople- she reports she does not have med list with her but can bring it to the hospital in the AM.    2300-[employer] sent to Dr. Daja Frances with all above info, including BP. No new orders at this time.  Electronically signed by Trice Sibley RN on 10/27/2021 at 11:04 PM

## 2021-10-28 NOTE — PROGRESS NOTES
Nephrology Progress Note    Assessment:  CKD-4  OHDx DM type-2  S/P CABGx       Plan:diuresis  entresto ?aldactone  finernone    Patient Active Problem List:     CHF (congestive heart failure), NYHA class I, acute on chronic, combined (HCC)      Subjective:  Admit Date: 10/27/2021    Interval History:breathing much better   Medications:  Scheduled Meds:   furosemide  40 mg IntraVENous Q12H    aspirin  81 mg Oral Daily    carvedilol  3.125 mg Oral BID    atorvastatin  40 mg Oral Nightly    hydrALAZINE  50 mg Oral 2 times per day    nitroglycerin  0.5 inch Topical 4 times per day    sodium chloride flush  5-40 mL IntraVENous 2 times per day    heparin (porcine)  5,000 Units SubCUTAneous 3 times per day    insulin glargine  0.15 Units/kg SubCUTAneous Nightly    insulin lispro  0-12 Units SubCUTAneous TID WC    insulin lispro  0-6 Units SubCUTAneous Nightly     Continuous Infusions:   sodium chloride      dextrose         CBC:   Recent Labs     10/27/21  1400 10/28/21  0730   WBC 6.3 5.7   HGB 10.9* 10.2*    185     CMP:    Recent Labs     10/27/21  1400 10/27/21  1833     --    K 4.3  --    *  --    CO2 25  --    BUN 32*  --    CREATININE 3.45*  --    GLUCOSE 171* 165   CALCIUM 8.6  --    LABGLOM 17.0*  --      Troponin:   Recent Labs     10/27/21  1741   TROPONINI 0.044*     BNP: No results for input(s): BNP in the last 72 hours. INR: No results for input(s): INR in the last 72 hours. Lipids: No results for input(s): CHOL, LDLDIRECT, TRIG, HDL, AMYLASE, LIPASE in the last 72 hours. Liver:   Recent Labs     10/27/21  1400   AST 15   ALT 10   ALKPHOS 113*   PROT 6.2*   LABALBU 3.3*   BILITOT 0.7     Iron:  No results for input(s): IRONS, FERRITIN in the last 72 hours. Invalid input(s): LABIRONS  Urinalysis: No results for input(s): UA in the last 72 hours.     Objective:  Vitals: BP (!) 151/68   Pulse 64   Temp 98.6 °F (37 °C)   Resp 18   Ht 5' 9\" (1.753 m)   Wt 224 lb (101.6 kg)   SpO2 95%   BMI 33.08 kg/m²    Wt Readings from Last 3 Encounters:   10/28/21 224 lb (101.6 kg)      24HR INTAKE/OUTPUT:      Intake/Output Summary (Last 24 hours) at 10/28/2021 3811  Last data filed at 10/28/2021 0013  Gross per 24 hour   Intake    Output 400 ml   Net -400 ml       General: alert, in no apparent distress  HEENT: normocephalic, atraumatic, anicteric  Neck: supple, no mass  Lungs: non-labored respirations, clear to auscultation bilaterally  Heart: regular rate and rhythm, 1/6 stystoilc murmurs or rubs  Abdomen: soft, non-tender, non-distended obese  Ext: no cyanosis, 1+ peripheral edema  Neuro: alert and oriented, no gross abnormalities  Psych: normal mood and affect  Skin: no rash      Electronically signed by Cait Chopra DO, MD

## 2021-10-28 NOTE — PROCEDURES
[facility]   Facility/Department: [facility]  Speech Language Pathology  Modified Barium Swallow (MBS)/Videofluoroscopic Study of Swallowing    NAME:Klaus Sheridan  : 1935 (81 y.o.)   MRN: [id number]  [facility]  ADMISSION DATE: 10/27/2021  PATIENT DIAGNOSIS(ES): CHF (congestive heart failure), NYHA class I, acute on chronic, combined ([facility] [address] [I50.43]  Congestive heart failure, unspecified HF chronicity, unspecified heart failure type ([address] [I50.9]  Chief Complaint   Patient presents with    Shortness of Breath     Patient Active Problem List    Diagnosis Date Noted    CHF (congestive heart failure), NYHA class I, acute on chronic, combined ([facility] 10/27/2021     History reviewed. No pertinent past medical history. History reviewed. No pertinent surgical history. No Known Allergies    Date of Onset: 10/27/2021      Date of Evaluation: 10/28/2021   Evaluating Therapist: KAILYN Miranda     Subjective: Patient reports intermittent right sided odynophagia/globus sensation for approximately 5-6 weeks. He reports it is the worst with dry swallows and when he is lying down. Patient reports it often \"takes his breath away\" if he is lying down. Patient reports this current episode of odynophagia began during breakfast this date. SUMMARY OF EVALUATION  DYSPHAGIA DIAGNOSIS:  Mild oropharyngeal dysphagia;  Recommend GI consult to evaluate for possible UES dysfunction, Recommend CT Neck due to patient reporting intermittent odynophagia    DIET RECOMMENDATIONS: Regular/Thin, as tolerated      FEEDING RECOMMENDATIONS:   No assistance required  Double swallow  Feed in upright position  Small bites/sips    THERAPY RECOMMENDATIONS:   Therapy is recommended to:  Assess tolerance of recommended diet  Improve tongue base retraction      Nursing notified: ALEX Sánchez      OBJECTIVE ASSESSMENT    Oral mechanism examination:  Decreased lingual ROM/strength    Dentition:  Upper dentures  Lower dentures    Current respiratory status:      Oxygen via nasal cannula    Baseline Vocal Quality:  Normal    Cognitive status:   Observed to be WFL   Possible cognitive deficits    Diet Level Prior to this Evaluation:    ADULT DIET; Regular; Low Sodium (2 gm); 1500 ml      PROCEDURE   Patient Positioning: Seated 70-90°  Viewing Plane(s): LATERAL ONLY  Contrast: commercially prepared, non-standardized barium viscosities; ranging from thin liquid to pudding consistency  Consistencies Administered During the Evaluation:   Puree (pudding)  Solid (cookie)  Thin liquid      Method of Intake:   Self Feed  Cup  Spoon    RESULTS     ORAL PHASE:     Oral Stage Impression: Mild oral dysphagia characterized by decreased lingual strength resulting in prolonged mastication time per regular solids, delayed A-P transit, decreased bolus cohesion/control, and premature bolus loss to the pharynx per all consistencies. Piecemeal swallowing of puree and regular solids observed. Trace-min residuals observed on base of tongue post swallow that the patient cleared spontaneously given increased time. PHARYNGEAL STAGE:    Delayed swallow initiation:  All  Premature spillage to valleculae: All  Premature spillage to pyriform: Solid and Thin  Pooling valleculae: Solid and Thin  Pooling pyriform:  Thin  Pharyngeal residue- valleculae: Puree and Soft solid  Pharyngeal residue- pyriform: Puree and Soft solid  Pharyngeal residue- UES: Puree, Solid and Thin        Aspiration Scale  Puree  Solid  Thin 1 Material does not enter the airway    2 Material enters the airway, remains above the vocal folds, and is ejected from the airway    3 Material enters the airway, remains above the vocal folds, and is not ejected from the airway    4 Material enters the airway, contacts the vocal folds, an is ejected from the airway    5 Material enters the airway, contacts the vocal folds, and is not ejected from the airway    6 Material enters the airway, passes below the vocal folds and is ejected into the larynx or out of the airway    7 Material enters the airway, passes below the vocal folds, and is not ejected from the trachea despite effort    8 Material enters the airway, passes below the vocal folds, and no effort is made to eject. Pharyngeal Stage Impression:  Mild pharyngeal dysphagia characterized by a delayed initiation of the pharyngeal swallow with the swallow reflex triggered at the level of the valleculae or pyriform sinuses per all consistencies tested. Reduced tongue base retraction resulted in trace-min residuals in the pyriform sinuses post swallow. Reduced tongue base retraction resulted in trace-min residuals in the pyriform sinuses post swallow. These residuals mostly cleared with prompted or spontaneous re-swallow. Suspected decreased cricopharyngeal opening resulting in residuals observed in the UES that re-entered the pharynx. No aspiration observed. CERVICAL ESOPHAGEAL STAGE :   Reduced cricopharyngeal opening           PLAN OF CARE:   Long Term Goals:    1. Pt will tolerate least restrictive diet with no clinical s/s of aspiration. 1. Pt will complete tongue press exercise with 80% accuracy, given cues as needed, to improve bolus control, bolus transfer, and to reduce residue in the valleculae per all consistencies. 2. Pt will complete chin tuck against resistance exercise with 80% accuracy, given cues as needed, to decrease upper esophogeal retention and decrease residue in the pyriform sinuses per all consistencies. 3. Pt will complete effortful swallow exercise with 80% accuracy, given cues as needed, to increase UES opening. 4. Pt will tolerate the recommended diet level without clinical s/s of aspiration.       Prognosis for improvements is: Good,  motivation    Pain Assessment:  Pre-Treatment  Pain assessment: 0-10  Pain level: 10  Intervention:  Called RN and reported patient's pain level.    Post-Treatment  Pain assessment: 0-10  Pain level: 10  Intervention:  Called RN and reported patient's pain level. Radiologist:  Available on Consult as needed, Radiology Tech present    Treatment goals were discussed with the:   Patient. , who gives fair understanding of recommendations. Thank you for your referral.  Please direct any questions or concerns to Speech Pathology. Images are available through [facility]/PACS. Please see radiologist report for additional details.       Therapy Time  Time in: 1451  Time out: 1502  Total minutes: 11    Signature:  Electronically signed by KAILYN Webster on 10/28/2021 at 3:40 PM

## 2021-10-28 NOTE — CONSULTS
[facility] [address]                      [address], [address] [facility]                                  CONSULTATION    PATIENT NAME: Tadeo Duran                    :        1935  MED REC NO:   [id number]                            ROOM:       [id number]  ACCOUNT NO:   [de-identified]                           ADMIT DATE: 10/27/2021  PROVIDER:     Andria Morin DO    CONSULT DATE:  10/28/2021    RENAL CONSULT    HISTORY OF PRESENT ILLNESS:  An 44-year-old who was admitted to the  hospital through Dr. Ivana Garcia office with severe shortness of breath and  leg swelling. The patient states that he has had difficulty breathing  for the past month. He has been in and out of hospitals over the past 3  months at [address] [place] [address] for questionable CVA versus subdural.  He was also  at [facility] for similar reason after a fall. He  presents now with shortness of breath and states that it has been going  on for a month with difficulty ambulating and swelling of the lower  extremities. He states that regarding his kidneys, he was told that he  had stage IV kidney disease by Dr. Alessia Gonzalez in the past.  On 06/15/2021,  the patient had a creatinine of 2.9 and GFR of 21 mL per minute. At  this time, the patient is being seen due to renal insufficiency. Admission serum creatinine was 3.45 with a GFR of 17 and normal  electrolytes. The patient had a hemoglobin of 10.9. He denied any  gross hematuria, frequent urinary tract infections, but did admit to 60  years ago of being told of kidney stone. He denies any excessive  nonsteroidal anti-inflammatory medications. He has improvement in his  breathing since his admission. The patient denies any chest pain or  chest pressure. No flank pain. PAST SURGICAL HISTORY:  No surgeries documented. FAMILY HISTORY:  Noncontributory. HABITS:  No smoking, no alcohol use, no opioids.     MEDICATIONS:  Can be obtained from the chart.    ALLERGIES TO MEDICATIONS:  Would be none. REVIEW OF SYSTEMS:  Noncontributory. PHYSICAL EXAMINATION:  VITAL SIGNS:  5 feet and 9 inches, 224 pounds. Blood pressure presently  150/68, heart rate 60, respirations 18, afebrile. HEENT:  Normocephalic. Pupils reactive to light. Sclerae are clear. NECK:  Supple. No JVD or adenopathy. CHEST:  Lungs decreased sounds, but no accessory muscle use or wheezing. CARDIOVASCULAR:  Heart is regular, 1/6 systolic murmur. ABDOMEN:  Obese, soft. No guarding or rigidity. EXTREMITIES:  Show the patient to have 1+ edema of the lower limbs. Skin is warm and dry. No cyanosis. IMPRESSION:  1.  CKD with SHANDRA related to decompensated heart failure. 2.  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 3.  Diabetes mellitus type 2.  4.  Hyperlipidemia. 5.  Obesity. PLAN:  Medications have been adjusted by Dr. Matias Moody. Continue diuresis. Follow I and Os and BMPs daily. Avoid nephrotoxic agents. We will  discuss with Dr. Matias Moody medication use.         Rody Bell DO    D: 10/28/2021 8:30:45       T: 10/28/2021 8:34:15     [id number]  Job#: [id number]     Doc#: [id number]    CC:

## 2021-10-28 NOTE — PROGRESS NOTES
Renal note  CKD-4  Cardiac disorder CHF known CVA Hypertension and DM will see in am full report then.  K+ normal  Pulmonary edema pleural effusions  Attempt diuresis entresto consideration fineronone possible add on

## 2021-10-28 NOTE — PROGRESS NOTES
Independent  Homemaking Assistance: Independent  Homemaking Responsibilities: No  Ambulation Assistance: Independent  Transfer Assistance: Independent  Active : No  Patient's  Info: Has not driven for the past 6 months    OBJECTIVE:   Vision: Within Functional Limits  Hearing: Exceptions to [facility]  Hearing Exceptions: Hard of hearing/hearing concerns    Cognition:  Overall Orientation Status: Within Normal Limits  Follows Commands: Within Functional Limits    Observation/Palpation  Posture: Fair  Observation: Supine in bed with multiple family members present, IV hep-locked, O2 via NC @ 3L    ROM:  RLE AROM: WFL  LLE AROM : WFL    Strength:  Strength RLE  Strength RLE: WFL  Strength LLE  Strength LLE: WFL    Neuro:  Balance  Posture: Fair  Sitting - Static: Good;-  Sitting - Dynamic: Fair  Standing - Static: Fair  Standing - Dynamic: Fair;-     Motor Control  Gross Motor?: WFL  Sensation  Overall Sensation Status: WFL    Bed mobility  Supine to Sit: Minimal assistance  Sit to Supine: Stand by assistance  Comment: With HOB slightly raised & use of bed rail    Transfers  Sit to Stand: Contact guard assistance  Stand to sit: Contact guard assistance    Ambulation  Ambulation?: Yes  WB Status: full weight bearing all extremities  Ambulation 1  Surface: level tile  Device: Rolling Walker  Assistance: Contact guard assistance  Quality of Gait: unsteady, difficulty with walker management  Gait Deviations: Slow Venus;Decreased step height;Decreased step length  Distance: 15' x 2  Comments: Patient reporting slight SOB following ambulation with spO2 @ 93%. Stairs/Curb  Stairs?: No         Activity Tolerance  Activity Tolerance: Patient limited by endurance  Activity Tolerance: Impaired secondary to SOB/sore throat          PT Education  PT Education: Goals;PT Role;Plan of Care;Precautions;Transfer Training;General Safety;Gait Training;Functional Mobility Training;Family Education; Energy Conservation (pulse oximeter for personal use)  Patient Education: Wife present for education. ASSESSMENT:   Body structures, Functions, Activity limitations: Decreased functional mobility ; Decreased strength;Decreased endurance;Decreased safe awareness;Decreased balance; Increased pain  Decision Making: Medium Complexity  History: medium  Exam: medium  Clinical Presentation: medium    Prognosis: Good  Patient Education: Wife present for education. DISCHARGE RECOMMENDATIONS:  Discharge Recommendations: Continue to assess pending progress    Assessment: Patient is currently hospitalized secondary to diagnoses listed above, presenting with deficits listed above. Patient would benefit from continued physical therapy services in order to improve patient's ability to perform functional mobility tasks. REQUIRES PT FOLLOW UP: Yes      PLAN OF CARE:  Plan  Times per week: 3-6  Current Treatment Recommendations: Strengthening, Balance Training, Transfer Training, Endurance Training, Gait Training, Neuromuscular Re-education, Patient/Caregiver Education & Training, Home Exercise Program, Functional Mobility Training, Equipment Evaluation, Education, & procurement, Safety Education & Training, Pain Management  Safety Devices  Type of devices: Left in bed, Call light within reach (Wife present with patient upon therapist's departure.)    Goals:  Patient goals : \"To figure out what is going on with my throat\"  Short term goals  Short term goal 1: Patient will perform bed mobility with HOB flat with modified independence. Short term goal 2: Patient will perform sit<>stand & stand pivot transfers with LRD with modified independence. Short term goal 3: Patient will ambulate 48' with LRD with modified independence. Short term goal 4: Patient will tolerate 5 minutes of standing functional mobility tasks consistently.     [facility] (6 CLICK) BASIC MOBILITY  AM-PAC Inpatient Mobility Raw Score : 18    Therapy Time:   Individual   Time In 1406   Time Out 1441   Minutes 35    Evaluation: 15 minutes  Bed Mobility/Transfers: 20 minutes       Lexy Craig PT, 10/28/21 at 4:11 PM         Definitions for assistance levels  Independent = pt does not require any physical supervision or assistance from another person for activity completion. Device may be needed.   Stand by assistance = pt requires verbal cues or instructions from another person, close to but not touching, to perform the activity  Minimal assistance= pt performs 75% or more of the activity; assistance is required to complete the activity  Moderate assistance= pt performs 50% of the activity; assistance is required to complete the activity  Maximal assistance = pt performs 25% of the activity; assistance is required to complete the activity  Dependent = pt requires total physical assistance to accomplish the task

## 2021-10-28 NOTE — PLAN OF CARE
Therapy evaluation completed. Please see daily notes and/or progress notes for details related to planned treatment interventions, goals and functional performance.      Electronically signed by Vashti Schlatter, PT on 10/28/2021 at 4:08 PM

## 2021-10-28 NOTE — PROGRESS NOTES
[facility]   Facility/Department: [address]  Speech Language Pathology    Paige Riggs  1935  [id number]    Date: 10/28/2021      Speech Therapy attempted to see Paige Riggs on this date for a/an:    Clinical Bedside Swallow Evaluation    Pt was unable to be seen due to: Other: Dr. Marcus Fung present to evaluate the patient. Will re-attempt at the earliest opportunity pending results of physician   evaluation. Addendum: MBS to be completed in place of Clinical Bedside Swallowing Evaluation.        Electronically signed by KAILYN Huddleston on 10/28/21 at 1:27 PM EDT

## 2021-10-28 NOTE — PROGRESS NOTES
Hospitalist Progress Note      PCP: Agusto Harvey DO    Date of Admission: 10/27/2021    Chief Complaint:    Chief Complaint   Patient presents with    Shortness of Breath     Subjective:  Patient feels like something is caught in his throat; this occasionally happens at home as well. Feels like his breathing is improved. 12 point ROS negative other than mentioned above     Medications:  Reviewed    Infusion Medications    sodium chloride      dextrose       Scheduled Medications    hydrALAZINE  100 mg Oral 2 times per day    lidocaine   Topical Once    furosemide  40 mg IntraVENous Q12H    aspirin  81 mg Oral Daily    carvedilol  3.125 mg Oral BID    atorvastatin  40 mg Oral Nightly    nitroglycerin  0.5 inch Topical 4 times per day    sodium chloride flush  5-40 mL IntraVENous 2 times per day    heparin (porcine)  5,000 Units SubCUTAneous 3 times per day    insulin glargine  0.15 Units/kg SubCUTAneous Nightly    insulin lispro  0-12 Units SubCUTAneous TID WC    insulin lispro  0-6 Units SubCUTAneous Nightly     PRN Meds: benzocaine-menthol, phenylephrine, sodium chloride flush, sodium chloride, acetaminophen, ondansetron **OR** ondansetron, glucose, dextrose, glucagon (rDNA), dextrose      Intake/Output Summary (Last 24 hours) at 10/28/2021 1421  Last data filed at 10/28/2021 1030  Gross per 24 hour   Intake    Output 700 ml   Net -700 ml     Exam:    BP (!) 186/69   Pulse 74   Temp 98.6 °F (37 °C)   Resp 18   Ht 5' 9\" (1.753 m)   Wt 224 lb (101.6 kg)   SpO2 95%   BMI 33.08 kg/m²     General appearance: No apparent distress, appears stated age and cooperative. HEENT: Conjunctivae/corneas clear. Neck: Trachea midline. Respiratory:  Normal respiratory effort. Clear to auscultation  Cardiovascular: Regular rate and rhythm   Abdomen: Soft, non-tender, non-distended with normal bowel sounds.   Musculoskeletal: +2 edema  Neuro: Non Focal.   Capillary Refill: Brisk,< 3 seconds   Peripheral Pulses: +2 palpable, equal bilaterally     Labs:   Recent Labs     10/27/21  1400 10/28/21  0730   WBC 6.3 5.7   HGB 10.9* 10.2*   HCT 31.2* 29.6*    185     Recent Labs     10/27/21  1400 10/28/21  0730    143   K 4.3 4.2   * 107   CO2 25 28   BUN 32* 32*   CREATININE 3.45* 3.74*   CALCIUM 8.6 8.3*     Recent Labs     10/27/21  1400   AST 15   ALT 10   BILITOT 0.7   ALKPHOS 113*     No results for input(s): INR in the last 72 hours. Recent Labs     10/27/21  1400 10/27/21  1741 10/28/21  0730   TROPONINI 0.049* 0.044* 0.049*     Urinalysis:      Lab Results   Component Value Date    NITRU NEGATIVE 03/25/2021    WBCUA 2 03/25/2021    BACTERIA 1+ 03/25/2021    RBCUA 1 03/25/2021    BLOODU NEGATIVE 03/25/2021     Radiology:  CT CHEST WO CONTRAST   Final Result      1. CHF   2. Bilateral pleural effusions with atelectasis and/or infiltrate   3. Enlarged main pulmonary artery. Reformatted pulmonary arterial hypertension         All CT scans at this facility use dose modulation, iterative reconstruction, and/or weight based dosing when appropriate to reduce radiation dose to as low as reasonably achievable. FL MODIFIED BARIUM SWALLOW W VIDEO    (Results Pending)     Assessment/Plan:    1. Acute on chronic decompensated CHF: Management per primary team; improving with diuresis  2. Acute hypoxic respiratory failure: Secondary to #1. Titrate oxygen to maintain sats >92%  3. CAD with CABG x5: on ASA, BB, and statin  4. SHANDRA on CKD stage IV: Continue diuresis; likely cardiorenal syndrome  5. DM type II: Continue lantus and SSI; doing well  6. Remote CVA: PT/oT  7. Dysphagia/Odynophagia:  ENT consulted; MBS per their recommendations    Active Hospital Problems    Diagnosis Date Noted    CHF (congestive heart failure), NYHA class I, acute on chronic, combined ([facility] [address] [I50.43] 10/27/2021     Additional work up or/and treatment plan may be added today or then after based on clinical progression.  I am managing a portion of pt care. Some medical issues are handled by other specialists. Additional work up and treatment should be done in out pt setting by pt PCP and other out pt providers. In addition to examining and evaluating pt, I spent additional time explaining care, normal and abnormal findings, and treatment plan. All of pt questions were answered. Counseling, diet and education were  provided. Case will be discussed with nursing staff when appropriate. Family will be updated if and when appropriate. Diet: ADULT DIET; Regular;  Low Sodium (2 gm); 1500 ml    Code Status: Full Code    PT/OT Eval     Electronically signed by Billie Macias MD on 10/28/2021 at 2:21 PM

## 2021-10-28 NOTE — FLOWSHEET NOTE
1650-RN to bedside. Family at bedside. Plan of care discussed and questions answered. 1915-Dr. Rodgers to bedside. EGD discussed. 2030-HS assessment completed. Pt denies CP. Reports SOB is unchanged since admit. Remains on 2 L NC. Consent signed for EGD tomorrow. Pt reports his throat still feels like something is stuck in it. No distress noted. Pt denies any other needs at this time. Falls precautions in place. Call light in reach. Team to continue to monitor.  Electronically signed by Kristin Trevizo RN on 10/28/2021 at 10:42 PM

## 2021-10-28 NOTE — CONSULTS
Leona Aguilar [address]                      [address], [address] [facility]                                  CONSULTATION    PATIENT NAME: Lam Mercado                    :        1935  MED REC NO:   [id number]                            ROOM:       O369  ACCOUNT NO:   [de-identified]                           ADMIT DATE: 10/27/2021  PROVIDER:     Hubert Arroyo MD    CONSULT DATE:  10/28/2021    HISTORY OF PRESENT ILLNESS:  The patient is an 81-year-old male with a  history of CHF admitted for same. He has had possible CVA, and has also  been noted to have had recent onset development today of some slight  sore throat with odynophagia. He notices difficulty with swallowing  food. He actually stipulates that this started when he was eating some  fruit. He denies swallowing any foreign body type material.  All  remaining ENT enquiry is otherwise grossly clear. He feels this is  mostly on his right hand side. PAST SURGICAL HISTORY:  Unremarkable. FAMILY HISTORY:  Noncontributory. SOCIAL HISTORY:  Nonsmoker. Non-alcohol abuser. MEDICATIONS:  As noted in the chart. ALLERGIES:  NKDA. PHYSICAL EXAMINATION:  GENERAL:  The patient is not in any acute distress, lying in bed 189 on  the first floor. HEENT:  External ear exam is normal.  The nasal exam is clear  anteriorly. The oral cavity and oropharynx are unremarkable. NECK:  Grossly unremarkable. Flexible laryngoscopy after topical  anesthesia reveals free cord mobility in appearance without evident  pooling of secretions. There is some slight thickening of the  esophageal introitus region. We see nothing more worrisome now. The  patient tolerated the flexible scope well. IMPRESSION AND PLAN:  Recent onset of odynophagia and foreign  body/globus sensation. Thankfully, ENT exam including flexible  laryngoscopy was grossly unremarkable.   In light of the above, we favor  observation from our vantage point, but he should complete his current  planned evaluation with our colleagues and speech pathology, and if that  examination is unremarkable, then he should be evaluated by our  colleagues in GI for EGD and further workup as they deem necessary. Detailed discussion with the nursing service and the patient and his  daughter in this regard with all questions answered. Sadia Ryan MD    D: 10/28/2021 13:42:12       T: 10/28/2021 15:40:15     [id number]  Job#: [id number]     Doc#: [id number]    CC:   eJan Clark MD

## 2021-10-28 NOTE — CARE COORDINATION
[facility] AT [place] Case Management Initial Discharge Assessment    The LSW met with the patient, his wife and daughter to discuss the discharge plan. PCP: Teresa Zhou,                                 Date of Last Visit: 10/27/2021     If no PCP, list provided? N/A    Discharge Planning    Living Arrangements: independently at home    Who do you live with? Spouse and his daughter    Who helps you with your care:  self    If lives at home:     Do you have any barriers navigating in your home? no    Patient can perform ADL? Yes    Current Services (outpatient and in home) :  None    Dialysis: No    Is transportation available to get to your appointments? Yes    DME Equipment:  Cane    Respiratory equipment: NO HOME O2, HOWEVER HAS O2 ON IN HOSPITAL ROOM    Respiratory provider:  N/A     Pharmacy:  [facility] [address] with Medication Assistance Program?  No      Patient agreeable to [address]? No    Patient agreeable to SNF/Rehab? N/A    Other discharge needs identified? N/A     Does Patient Have a High-Risk for Readmission Diagnosis (CHF, PN, MI, COPD)? Initial Discharge Plan? (Note: please see concurrent daily documentation for any updates after initial note). The patient states his discharge plan is to return home with spouse and daughter. The patient denies any discharge needs. The patient states he does not want [address] at this time. Home O2 eval?    Readmission Risk              Risk of Unplanned Readmission:  12            DCCOP was completed.    Electronically signed by Deon General on 10/28/2021 at 3:41 PM

## 2021-10-28 NOTE — CONSULTS
Consults    Patient Name: Emmanuel Chopra  Admit Date: 10/27/2021  1:33 PM  MR #: [id number]  : 1935    Attending Physician: Keiko Rodgers MD  Reason for consult: Odynophagia    History of Presenting Illness:      Emmanuel Chopra is a 81 y.o. male on hospital day 1 with a history of CHF. GI consult was requested because of difficulty in swallowing and pain for the last few weeks, and feels discomfort in the neck when he tries to swallow. Patient had ENT evaluation which was unremarkable and also had speech therapy evaluation which showed no evidence of any aspiration. No history of vomiting no history of any GI bleeding history of unknown amount of weight loss. Does not take any NSAIDs or biphosphonate's. . History Obtained From:  patient      History:      History reviewed. No pertinent past medical history. History reviewed. No pertinent surgical history. Family History  History reviewed. No pertinent family history. [] Unable to obtain due to ventilated and/ or neurologic status    Social History     Socioeconomic History    Marital status:      Spouse name: Not on file    Number of children: Not on file    Years of education: Not on file    Highest education level: Not on file   Occupational History    Not on file   Tobacco Use    Smoking status: Never Smoker    Smokeless tobacco: Never Used   Substance and Sexual Activity    Alcohol use: Not Currently    Drug use: Not Currently    Sexual activity: Not Currently   Other Topics Concern    Not on file   Social History Narrative    Not on file     Social Determinants of Health     Financial Resource Strain:     Difficulty of Paying Living Expenses:    Food Insecurity:     Worried About Running Out of Food in the Last Year:     [address] in the Last Year:    Transportation Needs:     Lack of Transportation (Medical):      Lack of Transportation (Non-Medical):    Physical Activity:     Days of Exercise per Week:     Minutes of Exercise per Session:    Stress:     Feeling of Stress :    Social Connections:     Frequency of Communication with Friends and Family:     Frequency of Social Gatherings with Friends and Family:     Attends Restorationism Services:     Active Member of Clubs or Organizations:     Attends Club or Organization Meetings:     Marital Status:    Intimate Partner Violence:     Fear of Current or Ex-Partner:     Emotionally Abused:     Physically Abused:     Sexually Abused:       [] Unable to obtain due to ventilated and/ or neurologic status      Home Medications:      Medications Prior to Admission: glimepiride (AMARYL) 4 MG tablet, Take 4 mg by mouth every morning (before breakfast)  SITagliptin (JANUVIA) 100 MG tablet, Take 100 mg by mouth daily  lisinopril-hydroCHLOROthiazide (PRINZIDE;ZESTORETIC) 10-12.5 MG per tablet, Take 1 tablet by mouth daily  metFORMIN (GLUCOPHAGE) 1000 MG tablet, Take 1,000 mg by mouth 2 times daily (with meals)  metoprolol succinate (TOPROL XL) 100 MG extended release tablet, Take 100 mg by mouth daily  amLODIPine-atorvastatatin (CADUET) 10-20 MG per tablet, Take 1 tablet by mouth daily  Multiple Vitamins-Minerals (CENTRUM SILVER ADULT 50+) TABS, Take 1 tablet by mouth daily  nitroGLYCERIN (NITROSTAT) 0.4 MG SL tablet, Place 0.4 mg under the tongue every 5 minutes as needed for Chest pain up to max of 3 total doses. If no relief after 1 dose, call 911.     Current Hospital Medications:     Scheduled Meds:   hydrALAZINE  100 mg Oral 2 times per day    furosemide  40 mg IntraVENous Q12H    aspirin  81 mg Oral Daily    carvedilol  3.125 mg Oral BID    atorvastatin  40 mg Oral Nightly    nitroglycerin  0.5 inch Topical 4 times per day    sodium chloride flush  5-40 mL IntraVENous 2 times per day    heparin (porcine)  5,000 Units SubCUTAneous 3 times per day    insulin glargine  0.15 Units/kg SubCUTAneous Nightly    insulin lispro  0-12 Units SubCUTAneous TID WC    insulin lispro  0-6 Units SubCUTAneous Nightly     Continuous Infusions:   sodium chloride      dextrose       PRN Meds:.benzocaine-menthol, phenylephrine, sodium chloride flush, sodium chloride, acetaminophen, ondansetron **OR** ondansetron, glucose, dextrose, glucagon (rDNA), dextrose  .  sodium chloride      dextrose          Allergies:     No Known Allergies     Review of Systems:       [x] CV, Resp, Neuro, , and all other systems reviewed and negative other than listed in HPI.      [] Unable to obtain due to ventilated and/ or neurologic status      Objective Findings:     Vitals:   Vitals:    10/28/21 0616 10/28/21 0639 10/28/21 1252 10/28/21 1527   BP: (!) 151/68  (!) 186/69 (!) 150/60   Pulse: 64  74 73   Resp:   18    Temp: 98.6 °F (37 °C)      TempSrc:       SpO2: 95%   95%   Weight:  224 lb (101.6 kg)     Height:            Physical Examination:  General: In no acute distress, breathing has improved and patient is currently on 2 L nasal cannula  HEENT: Obese neck  Neck: No jugular venous distention. Heart: Regular, no murmur, no rub/gallop. No right ventricular heave. Lungs: Decreased breath sounds on both bases  Abdomen: Soft nontender tender no palpable mass  Extremities: No clubbing/cyanosis, no edema. Skin: Warm, dry, normal turgor, no rash, no bruise, no petichiae. Neuro: No myoclonus or tremor.    Psych: Normal affect    Results/ Medications reviewed 10/28/2021, 7:18 PM     Laboratory, Microbiology, Pathology, Radiology, Cardiology, Medications and Transcriptions reviewed  Scheduled Meds:   hydrALAZINE  100 mg Oral 2 times per day    furosemide  40 mg IntraVENous Q12H    aspirin  81 mg Oral Daily    carvedilol  3.125 mg Oral BID    atorvastatin  40 mg Oral Nightly    nitroglycerin  0.5 inch Topical 4 times per day    sodium chloride flush  5-40 mL IntraVENous 2 times per day    heparin (porcine)  5,000 Units SubCUTAneous 3 times per day    insulin glargine  0.15 Units/kg SubCUTAneous Nightly    insulin lispro  0-12 Units SubCUTAneous TID WC    insulin lispro  0-6 Units SubCUTAneous Nightly     Continuous Infusions:   sodium chloride      dextrose         Recent Labs     10/27/21  1400 10/28/21  0730   WBC 6.3 5.7   HGB 10.9* 10.2*   HCT 31.2* 29.6*   MCV 93.3 95.3    185     Recent Labs     10/27/21  1400 10/28/21  0730    143   K 4.3 4.2   * 107   CO2 25 28   BUN 32* 32*   CREATININE 3.45* 3.74*     Recent Labs     10/27/21  1400   AST 15   ALT 10   BILITOT 0.7   ALKPHOS 113*     No results for input(s): LIPASE, AMYLASE in the last 72 hours. Recent Labs     10/27/21  1400   PROT 6.2*     Echocardiogram complete 2D with doppler with color    Result Date: 10/28/2021  Transthoracic Echocardiography Report (TTE)  Demographics   Patient Name    Shaina Tobias  Gender               Male                  J   Patient Number  [id number]       Race                                                   Ethnicity   Visit Number    [id number]      Room Number          O474   Corporate ID                   Date of Study        10/28/2021   Accession       [id number]     Referring Physician  Sondra Steiner MD  Number   Date of Birth   1935     Sonographer          Babs Mruray   Age             81 year(s)     Interpreting         [facility])                                 Physician            Cardiology                                                      [address] [place] [address]  Procedure Type of Study   TTE procedure:ECHO COMPLETE 2D W/DOP W/COLOR. Procedure Date Date: 10/28/2021 Start: 09:28 AM Study Location: Portable Technical Quality: Adequate visualization Indications:Congestive heart failure. Patient Status: Routine Height: 69 inches Weight: 224 pounds BSA: 2.17 m^2 BMI: 33.08 kg/m^2 BP: 169/57 mmHg  Conclusions   Summary  Normal mitral valve structure and function. Trace (1+) mitral regurgitation is present. Moderately dilated left atrium.   Left ventricular ejection fraction is visually estimated at 45%. Pseudonormal filling pattern noted. Signature   ----------------------------------------------------------------  Electronically signed by Christine TeixeiraInterpreting physician)  on 10/28/2021 05:58 PM  ----------------------------------------------------------------   Findings  Left Ventricle Left ventricular ejection fraction is visually estimated at 45%. Pseudonormal filling pattern noted. Right Ventricle Normal right ventricle structure and function. Normal right ventricle systolic pressure. Left Atrium Moderately dilated left atrium. Right Atrium Normal right atrium. Mitral Valve Normal mitral valve structure and function. Trace (1+) mitral regurgitation is present. Tricuspid Valve Normal tricuspid valve structure and function. Aortic Valve Normal aortic valve structure and function. Pulmonic Valve Normal pulmonic valve structure and function. Pericardial Effusion No evidence of pericardial effusion. Pleural Effusion No evidence of pleural effusion. Aorta \ Miscellaneous Miscellaneous normal findings were found. M-Mode Measurements (cm)   LVIDd: 5.31 cm                         LVIDs: 4 cm  IVSd: 1.69 cm                          IVSs: 1.71 cm  LVPWd: 1.35 cm                         LVPWs: 1.53 cm  Rt. Vent.  Dimension: 2.46 cm           AO Root Dimension: 3.21 cm                                         ACS: 1.9 cm                                         LVOT: 2.11 cm  Doppler Measurements:   AV Velocity:0.03 m/s                   MV Peak E-Wave: 0.85 m/s  AV Peak Gradient: 5.2 mmHg             MV Peak A-Wave: 0.64 m/s  AV Mean Gradient: 2.69 mmHg  AV Area (Continuity):3.06 cm^2  Valves  Mitral Valve   Peak E-Wave: 0.85 m/s                 Peak A-Wave: 0.64 m/s                                        E/A Ratio: 1.34                                        Peak Gradient: 2.91 mmHg                                        Deceleration Time: 151.1 msec   Tissue Doppler E' Septal Velocity: 0.05 m/s  E' Lateral Velocity: 0.07 m/s   Aortic Valve   Peak Velocity: 1.14 m/s                Mean Velocity: 0.77 m/s  Peak Gradient: 5.2 mmHg                Mean Gradient: 2.69 mmHg  Area (continuity): 3.06 cm^2  AV VTI: 30.04 cm   Cusp Separation: 1.9 cm   LVOT   Peak Velocity: 1.01 m/s               Mean Velocity: 0.62 m/s  Peak Gradient: 3.44 mmHg              Mean Gradient: 1.8 mmHg  LVOT Diameter: 2.11 cm                LVOT VTI: 26.26 cm  Structures  Left Atrium   LA Volume/Index: 76.18 ml /35 m^2             LA Area: 22.46 cm^2   Left Ventricle   Diastolic Dimension: 5.12 cm          Systolic Dimension: 4 cm  Septum Diastolic: 2.61 cm             Septum Systolic: 3.58 cm  PW Diastolic: 4.89 cm                 PW Systolic: 1.04 cm                                        FS: 24.7 %  LV EDV/LV EDV Index: 135.73 ml/63 m^2 LV ESV/LV ESV Index: 69.83 ml/32 m^2  EF Calculated: 48.6 %                 LV Length: 7.35 cm   LVOT Diameter: 2.11 cm   Right Ventricle   Diastolic Dimension: 3.07 cm  Aorta/ Miscellaneous Aorta   Aortic Root: 3.21 cm  LVOT Diameter: 2.11 cm      CT CHEST WO CONTRAST    Result Date: 10/27/2021  HISTORY: Shane Sandifer is a Male of 81 years age. DIAGNOSIS:  sob COMPARISON: None available. TECHNIQUE: Thin spiral axial CT was performed from the thoracic inlet to the lung bases, without intravenous contrast administration. 2-D reformatted axial, sagittal and coronal images were obtained. 3-D MIPS images were also performed. FINDINGS: Moderate size pleural effusions are seen bilaterally. The heart is mildly enlarged. Increased interstitial markings are seen bilaterally. Atelectasis or consolidation is seen in both bases. No aneurysm is seen. The main pulmonary artery measures 31 mm. Atherosclerotic calcifications are seen in the great vessels and superior mediastinum, aortic arch and coronary stents and/or calcifications are seen. There is evidence of median sternotomy.  No pericardial effusion is seen. Degenerative changes are seen in the spine. Limited survey views of the upper abdomen show calcifications in the spleen consistent with old granulomatous disease. The gallbladder is surgically absent. There are extensive atherosclerotic calcifications seen in the visualized portion of the abdomen. 1. CHF 2. Bilateral pleural effusions with atelectasis and/or infiltrate 3. Enlarged main pulmonary artery. Reformatted pulmonary arterial hypertension All CT scans at this facility use dose modulation, iterative reconstruction, and/or weight based dosing when appropriate to reduce radiation dose to as low as reasonably achievable. Impression:   Dysphagia and odynophagia, rule out esophagitis or esophageal stricture, ENT evaluation was unremarkable and also had speech therapy evaluation which was also unremarkable  Plan: Will schedule for EGD a.m. Comments: Thank you for allowing us to participate in the care of this patient. Will continue to follow. Please call if questions or concerns arise.     Electronically signed by Foster Guerra MD on 10/28/2021 at 7:18 PM

## 2021-10-28 NOTE — PROGRESS NOTES
Progress Note  Patient: Kenna Mason  Unit/Bed: W033/X480-05  YOB: 1935  MRN: [id number]  Acct: [de-identified]   Admitting Diagnosis: CHF (congestive heart failure), NYHA class I, acute on chronic, combined ([facility] [I50.43]  Congestive heart failure, unspecified HF chronicity, unspecified heart failure type [facility]) [I50.9]  Admit Date:  10/27/2021  Hospital Day: 1    Chief Complaint: CHF    Histories:  History reviewed. No pertinent past medical history. History reviewed. No pertinent surgical history. History reviewed. No pertinent family history. Social History     Socioeconomic History    Marital status:      Spouse name: None    Number of children: None    Years of education: None    Highest education level: None   Occupational History    None   Tobacco Use    Smoking status: Never Smoker    Smokeless tobacco: Never Used   Substance and Sexual Activity    Alcohol use: Not Currently    Drug use: Not Currently    Sexual activity: Not Currently   Other Topics Concern    None   Social History Narrative    None     Social Determinants of Health     Financial Resource Strain:     Difficulty of Paying Living Expenses:    Food Insecurity:     Worried About Running Out of Food in the Last Year:     Ran Out of Food in the Last Year:    Transportation Needs:     Lack of Transportation (Medical):      Lack of Transportation (Non-Medical):    Physical Activity:     Days of Exercise per Week:     Minutes of Exercise per Session:    Stress:     Feeling of Stress :    Social Connections:     Frequency of Communication with Friends and Family:     Frequency of Social Gatherings with Friends and Family:     Attends Spiritism Services:     Active Member of Clubs or Organizations:     Attends Club or Organization Meetings:     Marital Status:    Intimate Partner Violence:     Fear of Current or Ex-Partner:     Emotionally Abused:     Physically Abused:     Sexually Abused: Subjective/HPI still winded with activity. Le edema. No CP eating little    EKG: SR 70-80        Review of Systems:   Review of Systems   Constitutional: Negative. Negative for diaphoresis and fatigue. HENT: Negative. Eyes: Negative. Respiratory: Positive for shortness of breath. Negative for cough, chest tightness, wheezing and stridor. Cardiovascular: Positive for leg swelling. Negative for chest pain and palpitations. Gastrointestinal: Negative. Negative for blood in stool and nausea. Genitourinary: Negative. Musculoskeletal: Negative. Skin: Negative. Neurological: Negative. Negative for dizziness, syncope, weakness and light-headedness. Hematological: Negative. Psychiatric/Behavioral: Negative. Physical Examination:    BP (!) 151/68   Pulse 64   Temp 98.6 °F (37 °C)   Resp 18   Ht 5' 9\" (1.753 m)   Wt 224 lb (101.6 kg)   SpO2 95%   BMI 33.08 kg/m²    Physical Exam   Constitutional: He appears healthy. No distress. HENT:   Normal cephalic and Atraumatic   Eyes: Pupils are equal, round, and reactive to light. Neck: Thyroid normal. No JVD present. No neck adenopathy. No thyromegaly present. Cardiovascular: Normal rate, regular rhythm, normal heart sounds, intact distal pulses and normal pulses. Pulmonary/Chest: Effort normal and breath sounds normal. He has no wheezes. He has no rales. He exhibits no tenderness. Abdominal: Soft. Bowel sounds are normal. There is no abdominal tenderness. Musculoskeletal:         General: Edema (1-2+) present. No tenderness. Normal range of motion. Cervical back: Normal range of motion and neck supple. Neurological: He is alert and oriented to person, place, and time. Skin: Skin is warm. No cyanosis. Nails show no clubbing.        LABS:  CBC:   Lab Results   Component Value Date    WBC 5.7 10/28/2021    RBC 3.11 10/28/2021    HGB 10.2 10/28/2021    HCT 29.6 10/28/2021    MCV 95.3 10/28/2021    MCH 32.9 10/28/2021    MCHC 34.6 10/28/2021    RDW 15.5 10/28/2021     10/28/2021     CBC with Differential:    Lab Results   Component Value Date    WBC 5.7 10/28/2021    RBC 3.11 10/28/2021    HGB 10.2 10/28/2021    HCT 29.6 10/28/2021     10/28/2021    MCV 95.3 10/28/2021    MCH 32.9 10/28/2021    MCHC 34.6 10/28/2021    RDW 15.5 10/28/2021    LYMPHOPCT 16.5 10/27/2021    MONOPCT 7.4 10/27/2021    BASOPCT 0.6 10/27/2021    MONOSABS 0.5 10/27/2021    LYMPHSABS 1.0 10/27/2021    EOSABS 0.1 10/27/2021    BASOSABS 0.0 10/27/2021     CMP:    Lab Results   Component Value Date     10/28/2021    K 4.2 10/28/2021     10/28/2021    CO2 28 10/28/2021    BUN 32 10/28/2021    CREATININE 3.74 10/28/2021    GFRAA 18.7 10/28/2021    LABGLOM 15.5 10/28/2021    GLUCOSE 117 10/28/2021    GLUCOSE 228 06/15/2021    PROT 6.2 10/27/2021    LABALBU 3.3 10/27/2021    LABALBU 3.3 06/15/2021    CALCIUM 8.3 10/28/2021    BILITOT 0.7 10/27/2021    ALKPHOS 113 10/27/2021    AST 15 10/27/2021    ALT 10 10/27/2021     BMP:    Lab Results   Component Value Date     10/28/2021    K 4.2 10/28/2021     10/28/2021    CO2 28 10/28/2021    BUN 32 10/28/2021    LABALBU 3.3 10/27/2021    LABALBU 3.3 06/15/2021    CREATININE 3.74 10/28/2021    CALCIUM 8.3 10/28/2021    GFRAA 18.7 10/28/2021    LABGLOM 15.5 10/28/2021    GLUCOSE 117 10/28/2021    GLUCOSE 228 06/15/2021     Magnesium:    Lab Results   Component Value Date    MG 2.0 10/27/2021     Troponin:    Lab Results   Component Value Date    TROPONINI 0.044 10/27/2021        Active Hospital Problems    Diagnosis Date Noted    CHF (congestive heart failure), NYHA class I, acute on chronic, combined ([facility] [I50.43] 10/27/2021     Priority: Low        Assessment/Plan:  1. Acute on Chronic Decompensted CHF - appears Diastolic. Will conitnue iv Lasix. strit I/Os and follow labs. Need Echo  2. CABG-x5 - ASA BB Statin. 3. DM- will consult IM for assistance.   4. HPL - high dose Statin       Electronically signed by Teena Silvestre MD on 10/28/2021 at 8:43 AM

## 2021-10-29 ENCOUNTER — ANESTHESIA EVENT (OUTPATIENT)
Dept: ENDOSCOPY | Age: 86
DRG: 291 | End: 2021-10-29
Payer: MEDICARE

## 2021-10-29 ENCOUNTER — ANESTHESIA (OUTPATIENT)
Dept: ENDOSCOPY | Age: 86
DRG: 291 | End: 2021-10-29
Payer: MEDICARE

## 2021-10-29 ENCOUNTER — ANCILLARY PROCEDURE (OUTPATIENT)
Dept: ENDOSCOPY | Age: 86
DRG: 291 | End: 2021-10-29
Payer: MEDICARE

## 2021-10-29 VITALS — DIASTOLIC BLOOD PRESSURE: 61 MMHG | OXYGEN SATURATION: 96 % | SYSTOLIC BLOOD PRESSURE: 124 MMHG

## 2021-10-29 LAB
ANION GAP SERPL CALCULATED.3IONS-SCNC: 11 MEQ/L (ref 9–15)
BUN BLDV-MCNC: 32 MG/DL (ref 8–23)
CALCIUM SERPL-MCNC: 8.5 MG/DL (ref 8.5–9.9)
CHLORIDE BLD-SCNC: 108 MEQ/L (ref 95–107)
CO2: 26 MEQ/L (ref 20–31)
CREAT SERPL-MCNC: 4 MG/DL (ref 0.7–1.2)
GFR AFRICAN AMERICAN: 17.3
GFR NON-AFRICAN AMERICAN: 14.3
GLUCOSE BLD-MCNC: 124 MG/DL (ref 60–115)
GLUCOSE BLD-MCNC: 146 MG/DL (ref 70–99)
GLUCOSE BLD-MCNC: 161 MG/DL (ref 60–115)
GLUCOSE BLD-MCNC: 209 MG/DL (ref 60–115)
GLUCOSE BLD-MCNC: 239 MG/DL (ref 60–115)
HCT VFR BLD CALC: 27.8 % (ref 42–52)
HEMOGLOBIN: 9.8 G/DL (ref 14–18)
MCH RBC QN AUTO: 33.1 PG (ref 27–31.3)
MCHC RBC AUTO-ENTMCNC: 35.1 % (ref 33–37)
MCV RBC AUTO: 94.3 FL (ref 80–100)
PDW BLD-RTO: 15.3 % (ref 11.5–14.5)
PERFORMED ON: ABNORMAL
PLATELET # BLD: 187 K/UL (ref 130–400)
POTASSIUM SERPL-SCNC: 4.5 MEQ/L (ref 3.4–4.9)
RBC # BLD: 2.95 M/UL (ref 4.7–6.1)
SODIUM BLD-SCNC: 145 MEQ/L (ref 135–144)
WBC # BLD: 5.4 K/UL (ref 4.8–10.8)

## 2021-10-29 PROCEDURE — 88342 IMHCHEM/IMCYTCHM 1ST ANTB: CPT

## 2021-10-29 PROCEDURE — 93005 ELECTROCARDIOGRAM TRACING: CPT | Performed by: INTERNAL MEDICINE

## 2021-10-29 PROCEDURE — 7100000010 HC PHASE II RECOVERY - FIRST 15 MIN: Performed by: SPECIALIST

## 2021-10-29 PROCEDURE — 3609012400 HC EGD TRANSORAL BIOPSY SINGLE/MULTIPLE: Performed by: SPECIALIST

## 2021-10-29 PROCEDURE — 2060000000 HC ICU INTERMEDIATE R&B

## 2021-10-29 PROCEDURE — 85027 COMPLETE CBC AUTOMATED: CPT

## 2021-10-29 PROCEDURE — 80048 BASIC METABOLIC PNL TOTAL CA: CPT

## 2021-10-29 PROCEDURE — 97166 OT EVAL MOD COMPLEX 45 MIN: CPT

## 2021-10-29 PROCEDURE — 99233 SBSQ HOSP IP/OBS HIGH 50: CPT | Performed by: INTERNAL MEDICINE

## 2021-10-29 PROCEDURE — 3700000001 HC ADD 15 MINUTES (ANESTHESIA): Performed by: SPECIALIST

## 2021-10-29 PROCEDURE — 6360000002 HC RX W HCPCS: Performed by: NURSE ANESTHETIST, CERTIFIED REGISTERED

## 2021-10-29 PROCEDURE — 6370000000 HC RX 637 (ALT 250 FOR IP): Performed by: NURSE PRACTITIONER

## 2021-10-29 PROCEDURE — 43251 EGD REMOVE LESION SNARE: CPT | Performed by: SPECIALIST

## 2021-10-29 PROCEDURE — 2580000003 HC RX 258: Performed by: INTERNAL MEDICINE

## 2021-10-29 PROCEDURE — 2580000003 HC RX 258: Performed by: SPECIALIST

## 2021-10-29 PROCEDURE — 2500000003 HC RX 250 WO HCPCS: Performed by: INTERNAL MEDICINE

## 2021-10-29 PROCEDURE — 6360000002 HC RX W HCPCS: Performed by: INTERNAL MEDICINE

## 2021-10-29 PROCEDURE — 88305 TISSUE EXAM BY PATHOLOGIST: CPT

## 2021-10-29 PROCEDURE — 3700000000 HC ANESTHESIA ATTENDED CARE: Performed by: SPECIALIST

## 2021-10-29 PROCEDURE — 2700000000 HC OXYGEN THERAPY PER DAY

## 2021-10-29 PROCEDURE — 2709999900 HC NON-CHARGEABLE SUPPLY: Performed by: SPECIALIST

## 2021-10-29 PROCEDURE — 0DB68ZX EXCISION OF STOMACH, VIA NATURAL OR ARTIFICIAL OPENING ENDOSCOPIC, DIAGNOSTIC: ICD-10-PCS | Performed by: SPECIALIST

## 2021-10-29 PROCEDURE — 7100000011 HC PHASE II RECOVERY - ADDTL 15 MIN: Performed by: SPECIALIST

## 2021-10-29 PROCEDURE — 36415 COLL VENOUS BLD VENIPUNCTURE: CPT

## 2021-10-29 PROCEDURE — 2500000003 HC RX 250 WO HCPCS: Performed by: NURSE ANESTHETIST, CERTIFIED REGISTERED

## 2021-10-29 PROCEDURE — 6370000000 HC RX 637 (ALT 250 FOR IP): Performed by: INTERNAL MEDICINE

## 2021-10-29 RX ORDER — PROPOFOL 10 MG/ML
INJECTION, EMULSION INTRAVENOUS CONTINUOUS PRN
Status: DISCONTINUED | OUTPATIENT
Start: 2021-10-29 | End: 2021-10-29 | Stop reason: SDUPTHER

## 2021-10-29 RX ORDER — METOPROLOL TARTRATE 5 MG/5ML
2.5 INJECTION INTRAVENOUS EVERY 5 MIN PRN
Status: COMPLETED | OUTPATIENT
Start: 2021-10-29 | End: 2021-10-30

## 2021-10-29 RX ORDER — SODIUM CHLORIDE 0.9 % (FLUSH) 0.9 %
5-40 SYRINGE (ML) INJECTION EVERY 12 HOURS SCHEDULED
Status: DISCONTINUED | OUTPATIENT
Start: 2021-10-29 | End: 2021-11-03 | Stop reason: HOSPADM

## 2021-10-29 RX ORDER — LIDOCAINE HYDROCHLORIDE 20 MG/ML
INJECTION, SOLUTION INFILTRATION; PERINEURAL PRN
Status: DISCONTINUED | OUTPATIENT
Start: 2021-10-29 | End: 2021-10-29 | Stop reason: SDUPTHER

## 2021-10-29 RX ORDER — SODIUM CHLORIDE 9 MG/ML
25 INJECTION, SOLUTION INTRAVENOUS PRN
Status: DISCONTINUED | OUTPATIENT
Start: 2021-10-29 | End: 2021-11-03 | Stop reason: HOSPADM

## 2021-10-29 RX ORDER — FUROSEMIDE 10 MG/ML
40 INJECTION INTRAMUSCULAR; INTRAVENOUS DAILY
Status: DISCONTINUED | OUTPATIENT
Start: 2021-10-30 | End: 2021-10-31

## 2021-10-29 RX ORDER — SODIUM CHLORIDE 0.9 % (FLUSH) 0.9 %
5-40 SYRINGE (ML) INJECTION PRN
Status: DISCONTINUED | OUTPATIENT
Start: 2021-10-29 | End: 2021-11-03 | Stop reason: HOSPADM

## 2021-10-29 RX ORDER — SODIUM CHLORIDE 9 MG/ML
INJECTION, SOLUTION INTRAVENOUS CONTINUOUS
Status: DISCONTINUED | OUTPATIENT
Start: 2021-10-29 | End: 2021-10-30

## 2021-10-29 RX ORDER — CARVEDILOL 6.25 MG/1
6.25 TABLET ORAL 2 TIMES DAILY
Status: DISCONTINUED | OUTPATIENT
Start: 2021-10-29 | End: 2021-10-30

## 2021-10-29 RX ORDER — MAGNESIUM HYDROXIDE 1200 MG/15ML
LIQUID ORAL PRN
Status: DISCONTINUED | OUTPATIENT
Start: 2021-10-29 | End: 2021-10-29 | Stop reason: ALTCHOICE

## 2021-10-29 RX ADMIN — HYDRALAZINE HYDROCHLORIDE 100 MG: 100 TABLET, FILM COATED ORAL at 14:53

## 2021-10-29 RX ADMIN — CARVEDILOL 6.25 MG: 6.25 TABLET, FILM COATED ORAL at 20:53

## 2021-10-29 RX ADMIN — NITROGLYCERIN 0.5 INCH: 20 OINTMENT TOPICAL at 12:00

## 2021-10-29 RX ADMIN — INSULIN GLARGINE 15 UNITS: 100 INJECTION, SOLUTION SUBCUTANEOUS at 21:09

## 2021-10-29 RX ADMIN — FUROSEMIDE 40 MG: 10 INJECTION, SOLUTION INTRAMUSCULAR; INTRAVENOUS at 05:19

## 2021-10-29 RX ADMIN — METOPROLOL TARTRATE 2.5 MG: 5 INJECTION INTRAVENOUS at 20:53

## 2021-10-29 RX ADMIN — SODIUM CHLORIDE, PRESERVATIVE FREE 10 ML: 5 INJECTION INTRAVENOUS at 20:54

## 2021-10-29 RX ADMIN — ASPIRIN 81 MG: 81 TABLET, CHEWABLE ORAL at 14:52

## 2021-10-29 RX ADMIN — SODIUM CHLORIDE 500 ML: 9 INJECTION, SOLUTION INTRAVENOUS at 12:51

## 2021-10-29 RX ADMIN — NITROGLYCERIN 0.5 INCH: 20 OINTMENT TOPICAL at 14:52

## 2021-10-29 RX ADMIN — NITROGLYCERIN 0.5 INCH: 20 OINTMENT TOPICAL at 05:19

## 2021-10-29 RX ADMIN — ACETAMINOPHEN 650 MG: 325 TABLET ORAL at 14:53

## 2021-10-29 RX ADMIN — HYDRALAZINE HYDROCHLORIDE 100 MG: 100 TABLET, FILM COATED ORAL at 20:54

## 2021-10-29 RX ADMIN — NITROGLYCERIN 0.5 INCH: 20 OINTMENT TOPICAL at 20:54

## 2021-10-29 RX ADMIN — PROPOFOL 100 MCG/KG/MIN: 10 INJECTION, EMULSION INTRAVENOUS at 13:36

## 2021-10-29 RX ADMIN — HEPARIN SODIUM 5000 UNITS: 5000 INJECTION INTRAVENOUS; SUBCUTANEOUS at 05:19

## 2021-10-29 RX ADMIN — ATORVASTATIN CALCIUM 40 MG: 40 TABLET, FILM COATED ORAL at 20:53

## 2021-10-29 RX ADMIN — Medication 10 ML: at 22:35

## 2021-10-29 RX ADMIN — LIDOCAINE HYDROCHLORIDE 40 MG: 20 INJECTION, SOLUTION INFILTRATION; PERINEURAL at 13:36

## 2021-10-29 RX ADMIN — HEPARIN SODIUM 5000 UNITS: 5000 INJECTION INTRAVENOUS; SUBCUTANEOUS at 20:53

## 2021-10-29 RX ADMIN — HEPARIN SODIUM 5000 UNITS: 5000 INJECTION INTRAVENOUS; SUBCUTANEOUS at 14:54

## 2021-10-29 ASSESSMENT — PULMONARY FUNCTION TESTS
PIF_VALUE: 1

## 2021-10-29 ASSESSMENT — ENCOUNTER SYMPTOMS
EYES NEGATIVE: 1
CHEST TIGHTNESS: 0
GASTROINTESTINAL NEGATIVE: 1
STRIDOR: 0
BLOOD IN STOOL: 0
COUGH: 0
WHEEZING: 0
NAUSEA: 0
SHORTNESS OF BREATH: 1

## 2021-10-29 NOTE — ANESTHESIA PRE PROCEDURE
Department of Anesthesiology  Preprocedure Note       Name:  Hyacinth Polanco   Age:  81 y.o.  :  1935                                          MRN:  [id number]         Date:  10/29/2021      Surgeon: Cherie Bolton):  Carl Zavala MD    Procedure: Procedure(s):  EGD BIOPSY    Medications prior to admission:   Prior to Admission medications    Medication Sig Start Date End Date Taking? Authorizing Provider   glimepiride (AMARYL) 4 MG tablet Take 4 mg by mouth every morning (before breakfast)   Yes Historical Provider, MD   SITagliptin (JANUVIA) 100 MG tablet Take 100 mg by mouth daily   Yes Historical Provider, MD   lisinopril-hydroCHLOROthiazide (PRINZIDE;ZESTORETIC) 10-12.5 MG per tablet Take 1 tablet by mouth daily   Yes Historical Provider, MD   metFORMIN (GLUCOPHAGE) 1000 MG tablet Take 1,000 mg by mouth 2 times daily (with meals)   Yes Historical Provider, MD   metoprolol succinate (TOPROL XL) 100 MG extended release tablet Take 100 mg by mouth daily   Yes Historical Provider, MD   amLODIPine-atorvastatatin (CADUET) 10-20 MG per tablet Take 1 tablet by mouth daily   Yes Historical Provider, MD   Multiple Vitamins-Minerals (CENTRUM SILVER ADULT 50+) TABS Take 1 tablet by mouth daily   Yes Historical Provider, MD   nitroGLYCERIN (NITROSTAT) 0.4 MG SL tablet Place 0.4 mg under the tongue every 5 minutes as needed for Chest pain up to max of 3 total doses. If no relief after 1 dose, call 911.    Yes Historical Provider, MD       Current medications:    Current Facility-Administered Medications   Medication Dose Route Frequency Provider Last Rate Last Admin    sodium chloride flush 0.9 % injection 5-40 mL  5-40 mL IntraVENous 2 times per day Carl Zavala MD        sodium chloride flush 0.9 % injection 5-40 mL  5-40 mL IntraVENous PRN Carl Zavala MD        0.9 % sodium chloride infusion  25 mL IntraVENous PRN Carl Zavala MD        [START ON 10/30/2021] furosemide (LASIX) injection 40 mg  40 mg IntraVENous Daily Dominique Rivera MD        hydrALAZINE (APRESOLINE) tablet 100 mg  100 mg Oral 2 times per day Dominique Rivera MD   100 mg at 10/28/21 2036    benzocaine-menthol (CEPACOL SORE THROAT) lozenge 1 lozenge  1 lozenge Oral Q2H PRN Dominique Rivera MD   1 lozenge at 10/28/21 1246    phenylephrine (GABBY-SYNEPHRINE) 0.25 % nasal spray 1 spray  1 spray Each Nostril Q6H PRN Rain Bailey MD        aspirin chewable tablet 81 mg  81 mg Oral Daily Dominique Rivera MD   81 mg at 10/28/21 3964    carvedilol (COREG) tablet 3.125 mg  3.125 mg Oral BID Dominique Rivera MD   3.125 mg at 10/28/21 2036    atorvastatin (LIPITOR) tablet 40 mg  40 mg Oral Nightly Domiinque Rivera MD   40 mg at 10/28/21 2036    nitroglycerin (NITRO-BID) 2 % ointment 0.5 inch  0.5 inch Topical 4 times per day Dominique Rivera MD   0.5 inch at 10/29/21 1200    sodium chloride flush 0.9 % injection 5-40 mL  5-40 mL IntraVENous 2 times per day Dominique Rivera MD   10 mL at 10/28/21 2037    sodium chloride flush 0.9 % injection 5-40 mL  5-40 mL IntraVENous PRN Dominique Rivera MD        0.9 % sodium chloride infusion  25 mL IntraVENous PRN Dominique Rivera MD        acetaminophen (TYLENOL) tablet 650 mg  650 mg Oral Q4H PRN Dominique Rivera MD        ondansetron (ZOFRAN-ODT) disintegrating tablet 4 mg  4 mg Oral Q8H PRN Dominique Rivera MD        Or    [facility] injection 4 mg  4 mg IntraVENous Q6H PRN Dominique Rivera MD        heparin (porcine) injection 5,000 Units  5,000 Units SubCUTAneous 3 times per day Dominique Rivera MD   5,000 Units at 10/29/21 0519    glucose (GLUTOSE) 40 % oral gel 15 g  15 g Oral PRN Zita Abo, APRN - NP        dextrose 50 % IV solution  12.5 g IntraVENous PRN Zita Abo, APRN - NP        glucagon (rDNA) injection 1 mg  1 mg IntraMUSCular PRN Zita Abo, APRN - NP        dextrose 5 % solution  100 mL/hr IntraVENous PRN Zita Abo, APRN - NP        insulin glargine (LANTUS) injection vial 15 Units  0.15 Units/kg SubCUTAneous Nightly Lissette Smith, APRN - NP   15 Units at 10/28/21 2037    insulin lispro (HUMALOG) injection vial 0-12 Units  0-12 Units SubCUTAneous TID WC Lissette Smith, APRN - NP   2 Units at 10/28/21 1206    insulin lispro (HUMALOG) injection vial 0-6 Units  0-6 Units SubCUTAneous Nightly Lissette Smith, APRN - NP   1 Units at 10/28/21 2036       Allergies:  No Known Allergies    Problem List:    Patient Active Problem List   Diagnosis Code    CHF (congestive heart failure), NYHA class I, acute on chronic, combined ([facility] I50.43       Past Medical History:  History reviewed. No pertinent past medical history. Past Surgical History:  History reviewed. No pertinent surgical history. Social History:    Social History     Tobacco Use    Smoking status: Never Smoker    Smokeless tobacco: Never Used   Substance Use Topics    Alcohol use: Not Currently                                Counseling given: Not Answered      Vital Signs (Current):   Vitals:    10/28/21 2023 10/29/21 0119 10/29/21 0523 10/29/21 0642   BP: (!) 180/62 (!) 164/72  (!) 148/71   Pulse: 81 92  87   Resp: 18   21   Temp: 37.5 °C (99.5 °F) 37 °C (98.6 °F)  37.8 °C (100 °F)   TempSrc: Oral   Axillary   SpO2: 96% 94%  95%   Weight:   216 lb 14.4 oz (98.4 kg)    Height:                                                  BP Readings from Last 3 Encounters:   10/29/21 (!) 148/71       NPO Status:                                                                                 BMI:   Wt Readings from Last 3 Encounters:   10/29/21 216 lb 14.4 oz (98.4 kg)     Body mass index is 32.03 kg/m².     CBC:   Lab Results   Component Value Date    WBC 5.4 10/29/2021    RBC 2.95 10/29/2021    HGB 9.8 10/29/2021    HCT 27.8 10/29/2021    MCV 94.3 10/29/2021    RDW 15.3 10/29/2021     10/29/2021       CMP:   Lab Results   Component Value Date     10/29/2021    K 4.5 10/29/2021     10/29/2021    CO2 26 10/29/2021    BUN 32 10/29/2021    CREATININE 4.00 10/29/2021    GFRAA 17.3 10/29/2021    LABGLOM 14.3 10/29/2021    GLUCOSE 146 10/29/2021    GLUCOSE 228 06/15/2021    PROT 6.2 10/27/2021    CALCIUM 8.5 10/29/2021    BILITOT 0.7 10/27/2021    ALKPHOS 113 10/27/2021    AST 15 10/27/2021    ALT 10 10/27/2021       POC Tests:   Recent Labs     10/29/21  1203   POCGLU 124*       Coags: No results found for: PROTIME, INR, APTT    HCG (If Applicable): No results found for: PREGTESTUR, PREGSERUM, HCG, HCGQUANT     ABGs: No results found for: PHART, PO2ART, IIQ5HOT, WKV3MEY, BEART, Q9FBNWMN     Type & Screen (If Applicable):  No results found for: LABABO, LABRH    Drug/Infectious Status (If Applicable):  No results found for: HIV, HEPCAB    COVID-19 Screening (If Applicable):   Lab Results   Component Value Date    COVID19 Not Detected 10/27/2021           Anesthesia Evaluation    Airway: Mallampati: II  TM distance: >3 FB   Neck ROM: full  Mouth opening: > = 3 FB Dental:          Pulmonary:Negative Pulmonary ROS and normal exam                               Cardiovascular:    (+) hypertension:, CHF:, hyperlipidemia      ECG reviewed  Rhythm: regular  Rate: normal  Echocardiogram reviewed                  Neuro/Psych:   Negative Neuro/Psych ROS              GI/Hepatic/Renal:   (+) morbid obesity          Endo/Other:    (+) Diabetes, . Abdominal:   (+) obese,           Vascular: negative vascular ROS. Other Findings:             Anesthesia Plan      MAC     ASA 3       Induction: intravenous. Anesthetic plan and risks discussed with patient. Plan discussed with attending.                   PRINCESS Hernandez - CRNA   10/29/2021

## 2021-10-29 NOTE — PROGRESS NOTES
[facility]   Facility/Department: [address]  Speech Language Pathology    Eliesercrista Parmar  1935  [id number]    Date: 10/29/2021      Speech Therapy attempted to see Elieser Parmar on this date for a/an:    Treatment    Pt was unable to be seen due to:   Patient is NPO for testing. Patient going for EGD. ALEX Christian deferred. SLP followed up regarding referrals. Patient received CT of neck which indicated no foreign bodies.          Electronically signed by KAILYN Perera on 10/29/21 at 11:48 AM EDT

## 2021-10-29 NOTE — PROGRESS NOTES
Progress Note  Patient: Bryanna Standing  Unit/Bed: U727/B483-74  YOB: 1935  MRN: [id number]  Acct: [de-identified]   Admitting Diagnosis: CHF (congestive heart failure), NYHA class I, acute on chronic, combined ([facility] [I50.43]  Congestive heart failure, unspecified HF chronicity, unspecified heart failure type [facility]) [I50.9]  Admit Date:  10/27/2021  Hospital Day: 2    Chief Complaint: CHF    Histories:  History reviewed. No pertinent past medical history. History reviewed. No pertinent surgical history. History reviewed. No pertinent family history. Social History     Socioeconomic History    Marital status:      Spouse name: None    Number of children: None    Years of education: None    Highest education level: None   Occupational History    None   Tobacco Use    Smoking status: Never Smoker    Smokeless tobacco: Never Used   Substance and Sexual Activity    Alcohol use: Not Currently    Drug use: Not Currently    Sexual activity: Not Currently   Other Topics Concern    None   Social History Narrative    None     Social Determinants of Health     Financial Resource Strain:     Difficulty of Paying Living Expenses:    Food Insecurity:     Worried About Running Out of Food in the Last Year:     Ran Out of Food in the Last Year:    Transportation Needs:     Lack of Transportation (Medical):      Lack of Transportation (Non-Medical):    Physical Activity:     Days of Exercise per Week:     Minutes of Exercise per Session:    Stress:     Feeling of Stress :    Social Connections:     Frequency of Communication with Friends and Family:     Frequency of Social Gatherings with Friends and Family:     Attends Muslim Services:     Active Member of Clubs or Organizations:     Attends Club or Organization Meetings:     Marital Status:    Intimate Partner Violence:     Fear of Current or Ex-Partner:     Emotionally Abused:     Physically Abused:     Sexually Abused: Subjective/HPI breathing is better. Slept well. No cp. Odynophagia - ENT evaluated. GI- plan for EGD  Diuresed well. EKG: SR 74        Review of Systems:   Review of Systems   Constitutional: Negative. Negative for diaphoresis and fatigue. HENT: Negative. Eyes: Negative. Respiratory: Positive for shortness of breath. Negative for cough, chest tightness, wheezing and stridor. Cardiovascular: Positive for leg swelling. Negative for chest pain and palpitations. Gastrointestinal: Negative. Negative for blood in stool and nausea. Genitourinary: Negative. Musculoskeletal: Negative. Skin: Negative. Neurological: Negative. Negative for dizziness, syncope, weakness and light-headedness. Hematological: Negative. Psychiatric/Behavioral: Negative. Physical Examination:    BP (!) 148/71   Pulse 87   Temp 100 °F (37.8 °C) (Axillary)   Resp 21   Ht 5' 9\" (1.753 m)   Wt 216 lb 14.4 oz (98.4 kg)   SpO2 95%   BMI 32.03 kg/m²    Physical Exam   Constitutional: He appears healthy. No distress. HENT:   Normal cephalic and Atraumatic   Eyes: Pupils are equal, round, and reactive to light. Neck: Thyroid normal. No JVD present. No neck adenopathy. No thyromegaly present. Cardiovascular: Normal rate, regular rhythm, normal heart sounds, intact distal pulses and normal pulses. Pulmonary/Chest: Effort normal and breath sounds normal. He has no wheezes. He has no rales. He exhibits no tenderness. Abdominal: Soft. Bowel sounds are normal. There is no abdominal tenderness. Musculoskeletal:         General: Edema (1-2+) present. No tenderness. Normal range of motion. Cervical back: Normal range of motion and neck supple. Neurological: He is alert and oriented to person, place, and time. Skin: Skin is warm. No cyanosis. Nails show no clubbing.        LABS:  CBC:   Lab Results   Component Value Date    WBC 5.4 10/29/2021    RBC 2.95 10/29/2021    HGB 9.8 10/29/2021 HCT 27.8 10/29/2021    MCV 94.3 10/29/2021    MCH 33.1 10/29/2021    MCHC 35.1 10/29/2021    RDW 15.3 10/29/2021     10/29/2021     CBC with Differential:    Lab Results   Component Value Date    WBC 5.4 10/29/2021    RBC 2.95 10/29/2021    HGB 9.8 10/29/2021    HCT 27.8 10/29/2021     10/29/2021    MCV 94.3 10/29/2021    MCH 33.1 10/29/2021    MCHC 35.1 10/29/2021    RDW 15.3 10/29/2021    LYMPHOPCT 16.5 10/27/2021    MONOPCT 7.4 10/27/2021    BASOPCT 0.6 10/27/2021    MONOSABS 0.5 10/27/2021    LYMPHSABS 1.0 10/27/2021    EOSABS 0.1 10/27/2021    BASOSABS 0.0 10/27/2021     CMP:    Lab Results   Component Value Date     10/29/2021    K 4.5 10/29/2021     10/29/2021    CO2 26 10/29/2021    BUN 32 10/29/2021    CREATININE 4.00 10/29/2021    GFRAA 17.3 10/29/2021    LABGLOM 14.3 10/29/2021    GLUCOSE 146 10/29/2021    GLUCOSE 228 06/15/2021    PROT 6.2 10/27/2021    LABALBU 3.3 10/27/2021    LABALBU 3.3 06/15/2021    CALCIUM 8.5 10/29/2021    BILITOT 0.7 10/27/2021    ALKPHOS 113 10/27/2021    AST 15 10/27/2021    ALT 10 10/27/2021     BMP:    Lab Results   Component Value Date     10/29/2021    K 4.5 10/29/2021     10/29/2021    CO2 26 10/29/2021    BUN 32 10/29/2021    LABALBU 3.3 10/27/2021    LABALBU 3.3 06/15/2021    CREATININE 4.00 10/29/2021    CALCIUM 8.5 10/29/2021    GFRAA 17.3 10/29/2021    LABGLOM 14.3 10/29/2021    GLUCOSE 146 10/29/2021    GLUCOSE 228 06/15/2021     Magnesium:    Lab Results   Component Value Date    MG 2.0 10/27/2021     Troponin:    Lab Results   Component Value Date    TROPONINI 0.049 10/28/2021        Active Hospital Problems    Diagnosis Date Noted    CHF (congestive heart failure), NYHA class I, acute on chronic, combined ([facility] [I50.43] 10/27/2021     Priority: Low        Assessment/Plan:  1. 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 strit I/Os and follow labs. 2. LVEF 45%  3.  CKD- worse.   4. CABG-x5 - ASA BB Statin. 5. DM- on Insulin  6. Odynophagia- ENT evaluation negative. GI plans for EGD today. Appreciate Dr. Noam Shepard help on this matter.    7. HPL - high dose Statin       Electronically signed by Andrea Waite MD on 10/29/2021 at 8:21 AM

## 2021-10-29 NOTE — ANESTHESIA POSTPROCEDURE EVALUATION
Department of Anesthesiology  Postprocedure Note    Patient: Jamaal Martinez  MRN: [id number]  YOB: 1935  Date of evaluation: 10/29/2021  Time:  1:47 PM     Procedure Summary     Date: 10/29/21 Room / Location: [address] [place] [address] / [facility]    Anesthesia Start: 1330 Anesthesia Stop:     Procedure: EGD BIOPSY (N/A ) Diagnosis: (dysphagia)    Surgeons: Juan Antonio Vasquez MD Responsible Provider: PRINCESS Terrell CRNA    Anesthesia Type: MAC ASA Status: 3          Anesthesia Type: No value filed. Jesse Phase I:      Jesse Phase II:      Last vitals: Reviewed and per EMR flowsheets.        Anesthesia Post Evaluation    Patient location during evaluation: PACU  Patient participation: complete - patient participated  Level of consciousness: awake and alert  Pain score: 0  Airway patency: patent  Nausea & Vomiting: no nausea and no vomiting  Complications: no  Cardiovascular status: blood pressure returned to baseline and hemodynamically stable  Respiratory status: acceptable  Hydration status: euvolemic

## 2021-10-29 NOTE — PROGRESS NOTES
[facility] OCCUPATIONAL THERAPY EVALUATION - ACUTE     NAME: Bala De La Vega  : 1935 (81 y.o.)  MRN: [id number]  CODE STATUS: Full Code  [id number]    Date of Service: 10/29/2021    Patient Diagnosis(es): CHF (congestive heart failure), NYHA class I, acute on chronic, combined ([facility] [I50.43]  Congestive heart failure, unspecified HF chronicity, unspecified heart failure type [facility]) [I50.9]   Chief Complaint   Patient presents with    Shortness of Breath     Patient Active Problem List    Diagnosis Date Noted    CHF (congestive heart failure), NYHA class I, acute on chronic, combined ([facility] 10/27/2021        History reviewed. No pertinent past medical history. History reviewed. No pertinent surgical history. Restrictions:Fall,O2        Safety Devices: Safety Devices  Safety Devices in place: Yes  Type of devices: All fall risk precautions in place   Initially in place: No    Subjective:\"I want to go back to sleep. \"  \"I'm about 50/50. \"       Pain Reassessment: 0/10 pain reported          Prior Level of Function:  Social/Functional History  Lives With: Spouse, Daughter  Type of Home: House  Home Layout: One level  Home Access: Stairs to enter with rails  Entrance Stairs - Number of Steps: 3  Entrance Stairs - Rails: None  Bathroom Shower/Tub: Tub/Shower unit  Bathroom Equipment: Grab bars in shower  Home Equipment: Rolling walker, Cane, [address] Help From: Family  ADL Assistance: Independent  Homemaking Assistance: Independent  Homemaking Responsibilities: No  Ambulation Assistance: Independent  Transfer Assistance: Independent  Active : No  Patient's  Info: Has not driven for the past 6 months    OBJECTIVE:     Orientation Status:  Orientation  Overall Orientation Status: Impaired  Orientation Level: Oriented to person, Oriented to time    Observation:  Observation/Palpation  Posture: Fair  Observation: Retrograde posture noted when seated and in standing positions  Edema: mild bilateral LE    Cognition Status:  Cognition  Cognition Comment: Pt follows one step commands consistently    Perception Status:  Perception  Overall Perceptual Status: WFL    Sensation Status:  Sensation  Overall Sensation Status: WFL    Vision and Hearing Status:  Vision  Vision: Impaired  Vision Exceptions: Wears glasses for reading  Hearing  Hearing: Exceptions to [facility]  Hearing Exceptions: Hard of hearing/hearing concerns     ROM:   LUE AROM (degrees)  LUE AROM : WFL  Left Hand AROM (degrees)  Left Hand AROM: WFL  RUE AROM (degrees)  RUE AROM : WFL  Right Hand AROM (degrees)  Right Hand AROM: WFL    Strength:  LUE Strength  Gross LUE Strength: WFL  L Hand General: 4+/5  RUE Strength  Gross RUE Strength: WFL  R Hand General: 4+/5    Coordination, Tone, Quality of Movement: Tone RUE  RUE Tone: Normotonic  Tone LUE  LUE Tone: Normotonic  Coordination  Movements Are Fluid And Coordinated: No  Coordination and Movement description: Decreased speed, Right UE, Left UE    Hand Dominance:  Hand Dominance  Hand Dominance: Right    ADL Status:  ADL  Feeding: Unable to assess(comment), NPO (for testing)  Grooming: Setup, Increased time to complete  UE Bathing: Stand by assistance, Increased time to complete  LE Bathing: Minimal assistance, Increased time to complete  UE Dressing: Stand by assistance, Increased time to complete  LE Dressing:  Moderate assistance, Increased time to complete  Toileting: Unable to assess(comment)          Therapy key for assistance levels    Independent = Pt. is able to perform task with no assistance but may require a device   Stand by assistance = Pt. does not perform task at an independent level but does not need physical assistance, requires verbal cues  Minimal, Moderate, Maximal Assistance = Pt. requires physical assistance (25%, 50%, 75% assist from helper) for task but is able to actively participate in task   Dependent = Pt. requires total assistance with task and is not able to actively participate with task completion     Functional Mobility:     Transfers  Sit to stand: Minimal assistance  Stand to sit: Minimal assistance    Bed Mobility  Bed mobility  Supine to Sit: Supervision  Sit to Supine: Supervision  Scooting: Supervision    Seated and Standing Balance:  Balance  Sitting Balance: Contact guard assistance  Standing Balance: Minimal assistance    Functional Endurance:  Activity Tolerance  Activity Tolerance: Patient limited by fatigue, Treatment limited secondary to decreased cognition    D/C Recommendations:  OT D/C RECOMMENDATIONS  REQUIRES OT FOLLOW UP: Yes    Equipment Recommendations:       OT Education:        OT Follow Up:  OT D/C RECOMMENDATIONS  REQUIRES OT FOLLOW UP: Yes       Assessment/Discharge Disposition:     Performance deficits / Impairments: Decreased functional mobility , Decreased balance, Decreased ADL status, Decreased endurance, Decreased posture, Decreased cognition  Prognosis: Good  Discharge Recommendations: Continue to assess pending progress  Decision Making: Medium Complexity  History: minimal on file  Exam: 6 deficits  Assistance / Modification: MIn/Mod A    Six Click Score    How much help for putting on and taking off regular lower body clothing?: A Lot  How much help for Bathing?: A Little  How much help for Toileting?: None  How much help for putting on and taking off regular upper body clothing?: A Little  How much help for taking care of personal grooming?: None  How much help for eating meals?: None  AM-[facility] Inpatient Daily Activity Raw Score: 20  AM-PAC Inpatient ADL T-Scale Score : 42.03  ADL Inpatient CMS 0-100% Score: 38.32    Plan:  Plan  Times per week: 1-4x/wk  Current Treatment Recommendations: Balance Training, Self-Care / ADL, Cognitive Reorientation, Endurance Training, Functional Mobility Training, Neuromuscular Re-education    Goals:   Patient will:    - Improve functional endurance to tolerate/complete 10-15 mins of ADL's  - Be independent in UB ADLs   - Be SBA in LB ADLs  - Be Supervision in ADL transfers without LOB  - Be indpeendent in toileting tasks  - Improve bilateral UE strength and endurance to Fair+ in order to participate in self-care activities as projected. - Sequence self-care tasks with out verbal cues    Patient Goal: Patient goals :  To return to home with family      Discussed and agreed upon: Yes Comments:     Therapy Time:   OT Individual Minutes  Time In: 0900  Time Out: 3818  Minutes: 18    Eval: 18 minutes     Electronically signed by:    CINDA Talley/PAGE, OTR/L  96/93/5902, 11:17 AM Electronically signed by CINDA Talley/L on 57/47/13 at 11:12 AM EDT

## 2021-10-29 NOTE — PROGRESS NOTES
Physical Therapy Missed Treatment   Facility/Department: [facility] MED SURG MLOZ- GC OR/NONE    NAME: Karina Herron    : 1935 (81 y.o.)  MRN: [id number]    Account: [de-identified]  Gender: male    Chart reviewed, attempted PT at 0. Patient unavailable 2° to:    [x] Pt. . off floor for test/procedure. [x] Pt. Unavailable EGD today. Will attempt PT treatment again at earliest convenience.       Electronically signed by Jace Olmos PTA on 10/29/21 at 2:07 PM EDT

## 2021-10-29 NOTE — PROGRESS NOTES
Nephrology Progress Note    Assessment:  CKD-4  Hx OHDx CHF  Dysphagia          Plan:no changes at this time decent urine out-puts    Patient Active Problem List:     CHF (congestive heart failure), NYHA class I, acute on chronic, combined ([facility]      Subjective:  Admit Date: 10/27/2021    Interval History: seems to be doing fine  Swallowing work-up    Medications:  Scheduled Meds:   sodium chloride flush  5-40 mL IntraVENous 2 times per day    hydrALAZINE  100 mg Oral 2 times per day    furosemide  40 mg IntraVENous Q12H    aspirin  81 mg Oral Daily    carvedilol  3.125 mg Oral BID    atorvastatin  40 mg Oral Nightly    nitroglycerin  0.5 inch Topical 4 times per day    sodium chloride flush  5-40 mL IntraVENous 2 times per day    heparin (porcine)  5,000 Units SubCUTAneous 3 times per day    insulin glargine  0.15 Units/kg SubCUTAneous Nightly    insulin lispro  0-12 Units SubCUTAneous TID WC    insulin lispro  0-6 Units SubCUTAneous Nightly     Continuous Infusions:   sodium chloride      sodium chloride      dextrose         CBC:   Recent Labs     10/28/21  0730 10/29/21  0656   WBC 5.7 5.4   HGB 10.2* 9.8*    187     CMP:    Recent Labs     10/27/21  1400 10/27/21  1400 10/27/21  1425 10/27/21  1833 10/28/21  0730 10/29/21  0656     --   --   --  143 145*   K 4.3  --   --   --  4.2 4.5   *  --   --   --  107 108*   CO2 25  --   --   --  28 26   BUN 32*  --   --   --  32* 32*   CREATININE 3.45*  --  4.0*  --  3.74* 4.00*   GLUCOSE 171*   < >  --  165 117* 146*   CALCIUM 8.6  --   --   --  8.3* 8.5   LABGLOM 17.0*   < > 14*  --  15.5* 14.3*    < > = values in this interval not displayed. Troponin:   Recent Labs     10/28/21  0730   TROPONINI 0.049*     BNP: No results for input(s): BNP in the last 72 hours. INR: No results for input(s): INR in the last 72 hours. Lipids: No results for input(s): CHOL, LDLDIRECT, TRIG, HDL, AMYLASE, LIPASE in the last 72 hours.   Liver: Recent Labs     10/27/21  1400   AST 15   ALT 10   ALKPHOS 113*   PROT 6.2*   LABALBU 3.3*   BILITOT 0.7     Iron:  No results for input(s): IRONS, FERRITIN in the last 72 hours. Invalid input(s): LABIRONS  Urinalysis: No results for input(s): UA in the last 72 hours.     Objective:  Vitals: BP (!) 148/71   Pulse 87   Temp 100 °F (37.8 °C) (Axillary)   Resp 21   Ht 5' 9\" (1.753 m)   Wt 216 lb 14.4 oz (98.4 kg)   SpO2 95%   BMI 32.03 kg/m²    Wt Readings from Last 3 Encounters:   10/29/21 216 lb 14.4 oz (98.4 kg)      24HR INTAKE/OUTPUT:      Intake/Output Summary (Last 24 hours) at 10/29/2021 0819  Last data filed at 10/29/2021 0524  Gross per 24 hour   Intake 200 ml   Output 1600 ml   Net -1400 ml       General: alert, in no apparent distress  HEENT: normocephalic, atraumatic, anicteric  Neck: supple, no mass  Lungs: non-labored respirations, clear to auscultation bilaterally  Heart: regular rate and rhythm, no murmurs or rubs  Abdomen: soft, non-tender, non-distended  Ext: no cyanosis, no peripheral edema  Neuro: alert and oriented, no gross abnormalities  Psych: normal mood and affect  Skin: no rash      Electronically signed by Sonia Cooper DO, M

## 2021-10-29 NOTE — PROGRESS NOTES
Hospitalist Progress Note      PCP: Julia Spence DO    Date of Admission: 10/27/2021    Chief Complaint:    Chief Complaint   Patient presents with    Shortness of Breath     Subjective:  Patient feels like something is caught in his throat still; otherwise feels good. 12 point ROS negative other than mentioned above     Medications:  Reviewed    Infusion Medications    sodium chloride      sodium chloride 500 mL (10/29/21 1251)    sodium chloride      dextrose       Scheduled Medications    sodium chloride flush  5-40 mL IntraVENous 2 times per day    [START ON 10/30/2021] furosemide  40 mg IntraVENous Daily    hydrALAZINE  100 mg Oral 2 times per day    aspirin  81 mg Oral Daily    carvedilol  3.125 mg Oral BID    atorvastatin  40 mg Oral Nightly    nitroglycerin  0.5 inch Topical 4 times per day    sodium chloride flush  5-40 mL IntraVENous 2 times per day    heparin (porcine)  5,000 Units SubCUTAneous 3 times per day    insulin glargine  0.15 Units/kg SubCUTAneous Nightly    insulin lispro  0-12 Units SubCUTAneous TID WC    insulin lispro  0-6 Units SubCUTAneous Nightly     PRN Meds: sodium chloride flush, sodium chloride, sterile water for irrigation, benzocaine-menthol, phenylephrine, sodium chloride flush, sodium chloride, acetaminophen, ondansetron **OR** ondansetron, glucose, dextrose, glucagon (rDNA), dextrose      Intake/Output Summary (Last 24 hours) at 10/29/2021 1427  Last data filed at 10/29/2021 1031  Gross per 24 hour   Intake 200 ml   Output 1700 ml   Net -1500 ml     Exam:    BP (!) 150/78   Pulse 73   Temp 99.5 °F (37.5 °C) (Skin)   Resp 16   Ht 5' 9\" (1.753 m)   Wt 216 lb (98 kg)   SpO2 92%   BMI 31.90 kg/m²     General appearance: No apparent distress, appears stated age and cooperative. HEENT: Conjunctivae/corneas clear. Neck: Trachea midline. Respiratory:  Normal respiratory effort.  Clear to auscultation  Cardiovascular: Regular rate and rhythm   Abdomen: Soft, non-tender, non-distended with normal bowel sounds. Musculoskeletal: +1 edema  Neuro: Non Focal.   Capillary Refill: Brisk,< 3 seconds   Peripheral Pulses: +2 palpable, equal bilaterally     Labs:   Recent Labs     10/27/21  1400 10/28/21  0730 10/29/21  0656   WBC 6.3 5.7 5.4   HGB 10.9* 10.2* 9.8*   HCT 31.2* 29.6* 27.8*    185 187     Recent Labs     10/27/21  1400 10/27/21  1425 10/28/21  0730 10/29/21  0656     --  143 145*   K 4.3  --  4.2 4.5   *  --  107 108*   CO2 25  --  28 26   BUN 32*  --  32* 32*   CREATININE 3.45* 4.0* 3.74* 4.00*   CALCIUM 8.6  --  8.3* 8.5     Recent Labs     10/27/21  1400   AST 15   ALT 10   BILITOT 0.7   ALKPHOS 113*     No results for input(s): INR in the last 72 hours. Recent Labs     10/27/21  1400 10/27/21  1741 10/28/21  0730   TROPONINI 0.049* 0.044* 0.049*     Urinalysis:      Lab Results   Component Value Date    NITRU NEGATIVE 03/25/2021    WBCUA 2 03/25/2021    BACTERIA 1+ 03/25/2021    RBCUA 1 03/25/2021    BLOODU NEGATIVE 03/25/2021     Radiology:  CT SOFT TISSUE NECK WO CONTRAST   Preliminary Result   NO RADIOPAQUE FOREIGN BODY. PROMINENT ANTERIOR CERVICAL SPURRING. FL MODIFIED BARIUM SWALLOW W VIDEO   Final Result   Moderate oral and pharyngeal dysfunction without aspiration. Please refer to speech pathology  report for recommendations. CT CHEST WO CONTRAST   Final Result      1. CHF   2. Bilateral pleural effusions with atelectasis and/or infiltrate   3. Enlarged main pulmonary artery. Reformatted pulmonary arterial hypertension         All CT scans at this facility use dose modulation, iterative reconstruction, and/or weight based dosing when appropriate to reduce radiation dose to as low as reasonably achievable. Assessment/Plan:    1. Acute on chronic decompensated CHF: Management per primary team; improving with diuresis; peripheral edema much improved  2. Acute hypoxic respiratory failure: Secondary to #1. Titrate oxygen to maintain sats >92%  3. CAD with CABG x5: on ASA, BB, and statin  4. SHANDRA on CKD stage IV: Continue diuresis; likely cardiorenal syndrome  5. DM type II: Continue lantus and SSI; doing well  6. Remote CVA: PT/oT  7. Dysphagia/Odynophagia:  EGD today    Active Hospital Problems    Diagnosis Date Noted    Esophageal dysphagia [R13.19]     CHF (congestive heart failure), NYHA class I, acute on chronic, combined ([facility] [place] [address] [I50.43] 10/27/2021     Additional work up or/and treatment plan may be added today or then after based on clinical progression. I am managing a portion of pt care. Some medical issues are handled by other specialists. Additional work up and treatment should be done in out pt setting by pt PCP and other out pt providers. In addition to examining and evaluating pt, I spent additional time explaining care, normal and abnormal findings, and treatment plan. All of pt questions were answered. Counseling, diet and education were  provided. Case will be discussed with nursing staff when appropriate. Family will be updated if and when appropriate.       Diet: Diet NPO Exceptions are: Sips of Water with Meds    Code Status: Full Code    PT/OT [facility]     Electronically signed by Fela Rod MD on 10/29/2021 at 2:27 PM

## 2021-10-29 NOTE — FLOWSHEET NOTE
At pt bedside. Pt asleep. Urinal emptied. [id number] Assesment complete. Warm blankets given. Pt resting with lights off. Bed alarm on.   0745 pt continues to complain about something feeling stuck in his throat. He said today its been aprox 5 weeks on and off. I explained we can talk to the doctor prior to discharge to see if he qualifies for o2. Yesterday morning pt was back on room air to trial and his o2 was 89% with out exertion. Pt voices no other concerns at this time. 0825 Pt resting in bed Dr. Renetta Akins at bedside. No complaints at this time. Call light at side. 0929 Pt is resting in bed with lights off. Asked for more water, I explained he is still not allowed to have anything until he has his EGD done. Pt agreeable. No further request call light at side. Bed alarm on.   1432 Pt daughter walked on floor and I had a long converstion with her outside of the pts room. She is concerned about the pt going home and being so weak. She states he had a stroke in June and was sent home with no rehab. She is concerned about his well being and states his other and daughter and wife will not be able to take full care of him. I let her know I would speak with the doctor and we would speak with PT OT.   1020 Called endoscopy they will be calling for pt at noon his procedure is scheduled for 1pm.   1215 pt down to EGG   1410 report received from GI center. Pt daughter made aware. 1430 pt arrived to floor vitals taken. Pt was able to piviot to bed. Pt able to drink water and we warmed up his lunch tray for him. Family at bedside will continue to monitor. 1600 pt resting in bed family at bedside. 1900 Pt daughter at bedside, pt asleep.

## 2021-10-30 LAB
ANION GAP SERPL CALCULATED.3IONS-SCNC: 9 MEQ/L (ref 9–15)
BUN BLDV-MCNC: 33 MG/DL (ref 8–23)
CALCIUM SERPL-MCNC: 8 MG/DL (ref 8.5–9.9)
CHLORIDE BLD-SCNC: 101 MEQ/L (ref 95–107)
CO2: 26 MEQ/L (ref 20–31)
CREAT SERPL-MCNC: 4.26 MG/DL (ref 0.7–1.2)
GFR AFRICAN AMERICAN: 16.1
GFR NON-AFRICAN AMERICAN: 13.3
GLUCOSE BLD-MCNC: 154 MG/DL (ref 60–115)
GLUCOSE BLD-MCNC: 159 MG/DL (ref 60–115)
GLUCOSE BLD-MCNC: 168 MG/DL (ref 60–115)
GLUCOSE BLD-MCNC: 188 MG/DL (ref 60–115)
GLUCOSE BLD-MCNC: 203 MG/DL (ref 70–99)
GLUCOSE BLD-MCNC: 226 MG/DL (ref 60–115)
HCT VFR BLD CALC: 28.3 % (ref 42–52)
HEMOGLOBIN: 9.5 G/DL (ref 14–18)
MCH RBC QN AUTO: 31.8 PG (ref 27–31.3)
MCHC RBC AUTO-ENTMCNC: 33.7 % (ref 33–37)
MCV RBC AUTO: 94.6 FL (ref 80–100)
PDW BLD-RTO: 15.2 % (ref 11.5–14.5)
PERFORMED ON: ABNORMAL
PLATELET # BLD: 194 K/UL (ref 130–400)
POTASSIUM SERPL-SCNC: 4 MEQ/L (ref 3.4–4.9)
RBC # BLD: 3 M/UL (ref 4.7–6.1)
SODIUM BLD-SCNC: 136 MEQ/L (ref 135–144)
TROPONIN: 0.1 NG/ML (ref 0–0.01)
WBC # BLD: 6.3 K/UL (ref 4.8–10.8)

## 2021-10-30 PROCEDURE — 6370000000 HC RX 637 (ALT 250 FOR IP): Performed by: NURSE PRACTITIONER

## 2021-10-30 PROCEDURE — 6370000000 HC RX 637 (ALT 250 FOR IP): Performed by: INTERNAL MEDICINE

## 2021-10-30 PROCEDURE — 2580000003 HC RX 258: Performed by: SPECIALIST

## 2021-10-30 PROCEDURE — 2500000003 HC RX 250 WO HCPCS: Performed by: INTERNAL MEDICINE

## 2021-10-30 PROCEDURE — 6360000002 HC RX W HCPCS: Performed by: SPECIALIST

## 2021-10-30 PROCEDURE — 84484 ASSAY OF TROPONIN QUANT: CPT

## 2021-10-30 PROCEDURE — 2700000000 HC OXYGEN THERAPY PER DAY

## 2021-10-30 PROCEDURE — 2580000003 HC RX 258: Performed by: INTERNAL MEDICINE

## 2021-10-30 PROCEDURE — 99232 SBSQ HOSP IP/OBS MODERATE 35: CPT | Performed by: INTERNAL MEDICINE

## 2021-10-30 PROCEDURE — 6370000000 HC RX 637 (ALT 250 FOR IP): Performed by: SPECIALIST

## 2021-10-30 PROCEDURE — 85027 COMPLETE CBC AUTOMATED: CPT

## 2021-10-30 PROCEDURE — 80048 BASIC METABOLIC PNL TOTAL CA: CPT

## 2021-10-30 PROCEDURE — 2060000000 HC ICU INTERMEDIATE R&B

## 2021-10-30 PROCEDURE — 36415 COLL VENOUS BLD VENIPUNCTURE: CPT

## 2021-10-30 PROCEDURE — 6360000002 HC RX W HCPCS: Performed by: INTERNAL MEDICINE

## 2021-10-30 RX ORDER — ATORVASTATIN CALCIUM 20 MG/1
20 TABLET, FILM COATED ORAL NIGHTLY
Status: DISCONTINUED | OUTPATIENT
Start: 2021-10-30 | End: 2021-11-03 | Stop reason: HOSPADM

## 2021-10-30 RX ORDER — DIGOXIN 125 MCG
125 TABLET ORAL EVERY OTHER DAY
Status: DISCONTINUED | OUTPATIENT
Start: 2021-10-31 | End: 2021-11-01

## 2021-10-30 RX ORDER — METOPROLOL TARTRATE 5 MG/5ML
5 INJECTION INTRAVENOUS EVERY 6 HOURS PRN
Status: DISCONTINUED | OUTPATIENT
Start: 2021-10-30 | End: 2021-11-03 | Stop reason: HOSPADM

## 2021-10-30 RX ORDER — CARVEDILOL 12.5 MG/1
12.5 TABLET ORAL 2 TIMES DAILY
Status: DISCONTINUED | OUTPATIENT
Start: 2021-10-30 | End: 2021-11-02

## 2021-10-30 RX ORDER — DIGOXIN 0.25 MG/ML
125 INJECTION INTRAMUSCULAR; INTRAVENOUS EVERY 6 HOURS
Status: DISPENSED | OUTPATIENT
Start: 2021-10-30 | End: 2021-10-31

## 2021-10-30 RX ORDER — METOPROLOL TARTRATE 5 MG/5ML
5 INJECTION INTRAVENOUS ONCE
Status: COMPLETED | OUTPATIENT
Start: 2021-10-30 | End: 2021-10-30

## 2021-10-30 RX ADMIN — HEPARIN SODIUM 5000 UNITS: 5000 INJECTION INTRAVENOUS; SUBCUTANEOUS at 13:38

## 2021-10-30 RX ADMIN — METOPROLOL TARTRATE 2.5 MG: 5 INJECTION INTRAVENOUS at 00:21

## 2021-10-30 RX ADMIN — DILTIAZEM HYDROCHLORIDE 10 MG/HR: 5 INJECTION, SOLUTION INTRAVENOUS at 19:56

## 2021-10-30 RX ADMIN — FUROSEMIDE 40 MG: 10 INJECTION, SOLUTION INTRAMUSCULAR; INTRAVENOUS at 08:29

## 2021-10-30 RX ADMIN — METOPROLOL TARTRATE 2.5 MG: 5 INJECTION INTRAVENOUS at 00:16

## 2021-10-30 RX ADMIN — HEPARIN SODIUM 5000 UNITS: 5000 INJECTION INTRAVENOUS; SUBCUTANEOUS at 05:21

## 2021-10-30 RX ADMIN — ACETAMINOPHEN 650 MG: 325 TABLET ORAL at 00:21

## 2021-10-30 RX ADMIN — METOPROLOL TARTRATE 5 MG: 5 INJECTION INTRAVENOUS at 13:26

## 2021-10-30 RX ADMIN — CARVEDILOL 12.5 MG: 12.5 TABLET, FILM COATED ORAL at 20:00

## 2021-10-30 RX ADMIN — NITROGLYCERIN 0.5 INCH: 20 OINTMENT TOPICAL at 05:21

## 2021-10-30 RX ADMIN — HYDRALAZINE HYDROCHLORIDE 100 MG: 100 TABLET, FILM COATED ORAL at 08:28

## 2021-10-30 RX ADMIN — HYDRALAZINE HYDROCHLORIDE 100 MG: 100 TABLET, FILM COATED ORAL at 21:49

## 2021-10-30 RX ADMIN — BENZOCAINE AND MENTHOL 1 LOZENGE: 15; 3.6 LOZENGE ORAL at 08:28

## 2021-10-30 RX ADMIN — HEPARIN SODIUM 5000 UNITS: 5000 INJECTION INTRAVENOUS; SUBCUTANEOUS at 21:47

## 2021-10-30 RX ADMIN — SODIUM CHLORIDE, PRESERVATIVE FREE 10 ML: 5 INJECTION INTRAVENOUS at 19:59

## 2021-10-30 RX ADMIN — DILTIAZEM HYDROCHLORIDE 5 MG/HR: 5 INJECTION, SOLUTION INTRAVENOUS at 02:11

## 2021-10-30 RX ADMIN — DIGOXIN 125 MCG: 250 INJECTION, SOLUTION INTRAMUSCULAR; INTRAVENOUS; PARENTERAL at 15:55

## 2021-10-30 RX ADMIN — CARVEDILOL 6.25 MG: 6.25 TABLET, FILM COATED ORAL at 08:36

## 2021-10-30 RX ADMIN — INSULIN GLARGINE 15 UNITS: 100 INJECTION, SOLUTION SUBCUTANEOUS at 21:48

## 2021-10-30 RX ADMIN — DIGOXIN 125 MCG: 250 INJECTION, SOLUTION INTRAMUSCULAR; INTRAVENOUS; PARENTERAL at 19:56

## 2021-10-30 RX ADMIN — ASPIRIN 81 MG: 81 TABLET, CHEWABLE ORAL at 08:29

## 2021-10-30 RX ADMIN — ATORVASTATIN CALCIUM 20 MG: 20 TABLET, FILM COATED ORAL at 20:00

## 2021-10-30 RX ADMIN — SODIUM CHLORIDE, PRESERVATIVE FREE 10 ML: 5 INJECTION INTRAVENOUS at 08:29

## 2021-10-30 ASSESSMENT — ENCOUNTER SYMPTOMS
APNEA: 0
SHORTNESS OF BREATH: 1
COLOR CHANGE: 0
CHEST TIGHTNESS: 0

## 2021-10-30 ASSESSMENT — PAIN SCALES - GENERAL: PAINLEVEL_OUTOF10: 0

## 2021-10-30 NOTE — FLOWSHEET NOTE
2036 Monitor room tech called to notify me that the patient went into A-fib. Got an EKG to confirm. Sindy Schuster served Dr. Kimo Ballard for new orders to treat the tachycardia. Heart rate was sustaining at 120's-130. New orders received. Will continue to monitor. Floreshelen Jessica served Dr. Kimo Ballard regarding pt's heart rate sustaining 120-140's after receiving lopressor and coreg. New orders to start a cardizem gtt. Will continue to monitor.

## 2021-10-30 NOTE — PROGRESS NOTES
Renal Progress Note    Assessment and Plan:   50-year-old man with CKD stage IV with a baseline creatinine in the threes. Risk factors of coronary artery disease with history of bypass surgery, ejection fraction of 45%, diabetes mellitus and hypertension. Imaging of kidneys show 11.5 and 12.2 cm kidneys without any signs of hydronephrosis. Does have bilateral renal cysts. Urinalysis before with nephrotic range proteinuria. On Lasix 40 mg IV daily for fluid overload with some mild acute renal failure. Sees Dr. Fernando Randhawa as outpt    - iv lasix for now  - has slight drop in GFR but no change for now  - high risk of progression - discussed possible dialysis. He said he made up decision a while ago he wouldn't do dialysis if needed      Patient Active Problem List:     CHF (congestive heart failure), NYHA class I, acute on chronic, combined ([facility]     Esophageal dysphagia      Subjective:   Admit Date: 10/27/2021    Interval History: feels better. Able to eat / drink ok. Less sob.         Medications:   Scheduled Meds:   sodium chloride flush  5-40 mL IntraVENous 2 times per day    furosemide  40 mg IntraVENous Daily    carvedilol  6.25 mg Oral BID    hydrALAZINE  100 mg Oral 2 times per day    aspirin  81 mg Oral Daily    atorvastatin  40 mg Oral Nightly    sodium chloride flush  5-40 mL IntraVENous 2 times per day    heparin (porcine)  5,000 Units SubCUTAneous 3 times per day    insulin glargine  0.15 Units/kg SubCUTAneous Nightly    insulin lispro  0-12 Units SubCUTAneous TID WC    insulin lispro  0-6 Units SubCUTAneous Nightly     Continuous Infusions:   dilTIAZem (CARDIZEM) 125 mg in dextrose 5% 125 mL infusion 15 mg/hr (10/30/21 0228)    sodium chloride      sodium chloride 500 mL (10/29/21 1251)    sodium chloride      dextrose         CBC:   Recent Labs     10/29/21  0656 10/30/21  0903   WBC 5.4 6.3   HGB 9.8* 9.5*    194     CMP:    Recent Labs     10/28/21  0730 10/29/21  0656 10/30/21  0903    145* 136   K 4.2 4.5 4.0    108* 101   CO2 28 26 26   BUN 32* 32* 33*   CREATININE 3.74* 4.00* 4.26*   GLUCOSE 117* 146* 203*   CALCIUM 8.3* 8.5 8.0*   LABGLOM 15.5* 14.3* 13.3*     Troponin:   Recent Labs     10/28/21  0730   TROPONINI 0.049*     BNP: No results for input(s): BNP in the last 72 hours. INR: No results for input(s): INR in the last 72 hours. Lipids: No results for input(s): CHOL, LDLDIRECT, TRIG, HDL, AMYLASE, LIPASE in the last 72 hours. Liver:   Recent Labs     10/27/21  1400   AST 15   ALT 10   ALKPHOS 113*   PROT 6.2*   LABALBU 3.3*   BILITOT 0.7     Iron:  No results for input(s): IRONS, FERRITIN in the last 72 hours. Invalid input(s): LABIRONS  Urinalysis: No results for input(s): UA in the last 72 hours. Objective:   Vitals: /78   Pulse 130   Temp 97.7 °F (36.5 °C) (Oral)   Resp 16   Ht 5' 9\" (1.753 m)   Wt 216 lb (98 kg)   SpO2 95%   BMI 31.90 kg/m²    Wt Readings from Last 3 Encounters:   10/29/21 216 lb (98 kg)      24HR INTAKE/OUTPUT:      Intake/Output Summary (Last 24 hours) at 10/30/2021 1155  Last data filed at 10/30/2021 0828  Gross per 24 hour   Intake 10 ml   Output 800 ml   Net -790 ml       Constitutional:  Alert, awake, no apparent distress   Skin:normal, no rash  HEENT:sclera anicteric.   Head atraumatic normocephalic  Neck:supple with no thyromegally  Cardiovascular:  S1, S2 without m/r/g   Respiratory:  CTA B without w/r/r   Abdomen: +bs, soft, nt  Ext: 1+ LE edema  Musculoskeletal:Intact  Neuro:Alert and oriented with no deficit      Electronically signed by Meek Grant MD on 10/30/2021 at 11:55 AM

## 2021-10-30 NOTE — PROGRESS NOTES
81 y o male h/o CAD, s/p CABG in the past. Admitted with increase SOB, CHF decompensation, went into AFib with Fast V response. EF 40-45%, Lt atrium not huge, no valvular heart dis. Abnormal Renal Fx, increasing gradually   DM  He went into AFib with FVR, was started on IV Cardizem, already on Coreg 6.25 BID. Will start loading with IV Dig, then renal dig dose if needed. We will stop if renal Fx continues to deteriorate. He needs oral anticoagulation , his chads-vas score is high once cleared by GI, warfarin will be his best choice.   ? IV / PO Amiodaron  Will trapper off  IV Cardizem down as he slow his h rate

## 2021-10-30 NOTE — PROGRESS NOTES
Hospitalist Progress Note      PCP: Janice Ormond, DO    Date of Admission: 10/27/2021    Chief Complaint:    Chief Complaint   Patient presents with    Shortness of Breath     Subjective:  Patient feels like his breathing is improving; denies fevers, chills, sweats; throat is feeling better. 12 point ROS negative other than mentioned above     Medications:  Reviewed    Infusion Medications    dilTIAZem (CARDIZEM) 125 mg in dextrose 5% 125 mL infusion 15 mg/hr (10/30/21 0228)    sodium chloride      sodium chloride      dextrose       Scheduled Medications    metoprolol  5 mg IntraVENous Once    sodium chloride flush  5-40 mL IntraVENous 2 times per day    furosemide  40 mg IntraVENous Daily    carvedilol  6.25 mg Oral BID    hydrALAZINE  100 mg Oral 2 times per day    aspirin  81 mg Oral Daily    atorvastatin  40 mg Oral Nightly    sodium chloride flush  5-40 mL IntraVENous 2 times per day    heparin (porcine)  5,000 Units SubCUTAneous 3 times per day    insulin glargine  0.15 Units/kg SubCUTAneous Nightly    insulin lispro  0-12 Units SubCUTAneous TID WC    insulin lispro  0-6 Units SubCUTAneous Nightly     PRN Meds: sodium chloride flush, sodium chloride, benzocaine-menthol, phenylephrine, sodium chloride flush, sodium chloride, acetaminophen, ondansetron **OR** ondansetron, glucose, dextrose, glucagon (rDNA), dextrose      Intake/Output Summary (Last 24 hours) at 10/30/2021 1322  Last data filed at 10/30/2021 0828  Gross per 24 hour   Intake 10 ml   Output 800 ml   Net -790 ml     Exam:    /78   Pulse 130   Temp 97.7 °F (36.5 °C) (Oral)   Resp 16   Ht 5' 9\" (1.753 m)   Wt 216 lb (98 kg)   SpO2 95%   BMI 31.90 kg/m²     General appearance: No apparent distress, appears stated age and cooperative. HEENT: Conjunctivae/corneas clear. Neck: Trachea midline. Respiratory:  Normal respiratory effort.  Clear to auscultation  Cardiovascular: Regular rate and rhythm   Abdomen: Soft, non-tender, non-distended with normal bowel sounds. Musculoskeletal: No edema  Neuro: Non Focal.   Capillary Refill: Brisk,< 3 seconds   Peripheral Pulses: +2 palpable, equal bilaterally     Labs:   Recent Labs     10/28/21  0730 10/29/21  0656 10/30/21  0903   WBC 5.7 5.4 6.3   HGB 10.2* 9.8* 9.5*   HCT 29.6* 27.8* 28.3*    187 194     Recent Labs     10/28/21  0730 10/29/21  0656 10/30/21  0903    145* 136   K 4.2 4.5 4.0    108* 101   CO2 28 26 26   BUN 32* 32* 33*   CREATININE 3.74* 4.00* 4.26*   CALCIUM 8.3* 8.5 8.0*     Recent Labs     10/27/21  1400   AST 15   ALT 10   BILITOT 0.7   ALKPHOS 113*     No results for input(s): INR in the last 72 hours. Recent Labs     10/27/21  1400 10/27/21  1741 10/28/21  0730   TROPONINI 0.049* 0.044* 0.049*     Urinalysis:      Lab Results   Component Value Date    NITRU NEGATIVE 03/25/2021    WBCUA 2 03/25/2021    BACTERIA 1+ 03/25/2021    RBCUA 1 03/25/2021    BLOODU NEGATIVE 03/25/2021     Radiology:  CT SOFT TISSUE NECK WO CONTRAST   Preliminary Result   NO RADIOPAQUE FOREIGN BODY. PROMINENT ANTERIOR CERVICAL SPURRING. FL MODIFIED BARIUM SWALLOW W VIDEO   Final Result   Moderate oral and pharyngeal dysfunction without aspiration. Please refer to speech pathology  report for recommendations. CT CHEST WO CONTRAST   Final Result      1. CHF   2. Bilateral pleural effusions with atelectasis and/or infiltrate   3. Enlarged main pulmonary artery. Reformatted pulmonary arterial hypertension         All CT scans at this facility use dose modulation, iterative reconstruction, and/or weight based dosing when appropriate to reduce radiation dose to as low as reasonably achievable. Assessment/Plan:    1. Acute on chronic decompensated CHF: Management per primary team; improving with diuresis; peripheral edema has resolved; breathing much improved  2. Acute hypoxic respiratory failure: Secondary to #1.  Titrate oxygen to maintain sats >92%  3. CAD with CABG x5: on ASA, BB, and statin  4. SHANDRA on CKD stage IV: Continue diuresis; likely cardiorenal syndrome  5. A Fib with RVR:  On cardizem; RN discussed anticoagulation with cardiology who stated they will address it per RN  6. DM type II: Continue lantus and SSI; doing well  7. Remote CVA: PT/oT  8. Dysphagia/Odynophagia:  Resolved    Active Hospital Problems    Diagnosis Date Noted    Esophageal dysphagia [R13.19]     CHF (congestive heart failure), NYHA class I, acute on chronic, combined ([facility] [address] [I50.43] 10/27/2021     Additional work up or/and treatment plan may be added today or then after based on clinical progression. I am managing a portion of pt care. Some medical issues are handled by other specialists. Additional work up and treatment should be done in out pt setting by pt PCP and other out pt providers. In addition to examining and evaluating pt, I spent additional time explaining care, normal and abnormal findings, and treatment plan. All of pt questions were answered. Counseling, diet and education were  provided. Case will be discussed with nursing staff when appropriate. Family will be updated if and when appropriate. Diet: ADULT DIET; Regular; 4 carb choices (60 gm/meal);  Low Sodium (2 gm); advance as tolerated    Code Status: Full Code    PT/OT Eval     Electronically signed by Edilberto Quiñonez MD on 10/30/2021 at 1:22 PM

## 2021-10-30 NOTE — PROGRESS NOTES
Physical Therapy Missed Treatment   Facility/Department: [facility] MED SURG T274/[id number]    NAME: Alfreda Aquino    : 1935 (81 y.o.)  MRN: [id number]    Account: [de-identified]  Gender: male    Chart reviewed, attempted PT at 46. Patient unavailable 2° to:        [x] Pt. Unavailable with  At this time       Will attempt PT treatment again at earliest convenience.       Electronically signed by Cammy Cat PTA on 10/30/21 at 10:43 AM EDT

## 2021-10-30 NOTE — PROGRESS NOTES
Progress Note  Patient: Ellyn Laguerrer  Unit/Bed: F437/L393-13  YOB: 1935  MRN: [id number]  Acct: [de-identified]   Admitting Diagnosis: CHF (congestive heart failure), NYHA class I, acute on chronic, combined ([facility] [I50.43]  Congestive heart failure, unspecified HF chronicity, unspecified heart failure type [facility]) [I50.9]  Date:  10/27/2021  Hospital Day: 3    Chief Complaint:  Shortness of breath    Subjective  Breathing is a bit better. Was seen by ENT and underwent polyp resection. Feels somewhat better. Still has some painful swallowing. GI evaluation is pending continues to be in atrial fibrillation with moderately increased ventricular rate. Troponin is elevated because of renal insufficiency. He is known to have remote CABG in [place] 15 years ago. His daughter is in the room. Also apparently had a CVA few months ago and not driving currently. Review of Systems:   Review of Systems   Constitutional: Negative for appetite change, diaphoresis and fatigue. Respiratory: Positive for shortness of breath. Negative for apnea and chest tightness. Cardiovascular: Positive for chest pain, palpitations and leg swelling. Skin: Negative for color change, pallor, rash and wound. Neurological: Negative for dizziness, syncope, weakness, light-headedness and headaches. Psychiatric/Behavioral: Negative for agitation, behavioral problems and confusion. The patient is not nervous/anxious and is not hyperactive. Physical Examination:    /78   Pulse 130   Temp 97.7 °F (36.5 °C) (Oral)   Resp 16   Ht 5' 9\" (1.753 m)   Wt 216 lb (98 kg)   SpO2 95%   BMI 31.90 kg/m²    Physical Exam  Constitutional:       Appearance: He is ill-appearing. Cardiovascular:      Rate and Rhythm: Rhythm irregular. Heart sounds: Murmur heard. Gallop present. Neurological:      Mental Status: He is oriented to person, place, and time.          LABS:  CBC:   Lab Results   Component Value Date WBC 6.3 10/30/2021    RBC 3.00 10/30/2021    HGB 9.5 10/30/2021    HCT 28.3 10/30/2021    MCV 94.6 10/30/2021    MCH 31.8 10/30/2021    MCHC 33.7 10/30/2021    RDW 15.2 10/30/2021     10/30/2021     CBC with Differential:   Lab Results   Component Value Date    WBC 6.3 10/30/2021    RBC 3.00 10/30/2021    HGB 9.5 10/30/2021    HCT 28.3 10/30/2021     10/30/2021    MCV 94.6 10/30/2021    MCH 31.8 10/30/2021    MCHC 33.7 10/30/2021    RDW 15.2 10/30/2021    LYMPHOPCT 16.5 10/27/2021    MONOPCT 7.4 10/27/2021    BASOPCT 0.6 10/27/2021    MONOSABS 0.5 10/27/2021    LYMPHSABS 1.0 10/27/2021    EOSABS 0.1 10/27/2021    BASOSABS 0.0 10/27/2021     CMP:    Lab Results   Component Value Date     10/30/2021    K 4.0 10/30/2021     10/30/2021    CO2 26 10/30/2021    BUN 33 10/30/2021    CREATININE 4.26 10/30/2021    GFRAA 16.1 10/30/2021    LABGLOM 13.3 10/30/2021    GLUCOSE 203 10/30/2021    GLUCOSE 228 06/15/2021    PROT 6.2 10/27/2021    LABALBU 3.3 10/27/2021    LABALBU 3.3 06/15/2021    CALCIUM 8.0 10/30/2021    BILITOT 0.7 10/27/2021    ALKPHOS 113 10/27/2021    AST 15 10/27/2021    ALT 10 10/27/2021     BMP:    Lab Results   Component Value Date     10/30/2021    K 4.0 10/30/2021     10/30/2021    CO2 26 10/30/2021    BUN 33 10/30/2021    LABALBU 3.3 10/27/2021    LABALBU 3.3 06/15/2021    CREATININE 4.26 10/30/2021    CALCIUM 8.0 10/30/2021    GFRAA 16.1 10/30/2021    LABGLOM 13.3 10/30/2021    GLUCOSE 203 10/30/2021    GLUCOSE 228 06/15/2021     Magnesium:    Lab Results   Component Value Date    MG 2.0 10/27/2021     Troponin:    Lab Results   Component Value Date    TROPONINI 0.049 10/28/2021       Radiology:  CT SOFT TISSUE NECK WO CONTRAST    Result Date: 10/29/2021  EXAMINATION: CT SOFT TISSUE NECK WO CONTRAST DATE AND TIME:10/28/2021 8:09 PM CLINICAL HISTORY: Acute neck pain.   foreign body sensation COMPARISON: None TECHNIQUE: Sequential axial CT images were obtained from the skull base to the thoracic inlet. No IV contrast.  All CT scans at this facility use dose modulation, iterative reconstruction, and/or weight based dosing when appropriate to reduce radiation dose to as low as reasonably achievable. There is no radiopaque foreign body. There are exuberant anterior osteophytes from C2 through C6 that encroach on the retropharyngeal soft tissues most striking in the hypopharynx. Occasionally these prominent osteophytes can be a source of dysphagia. Josh Freud Pharyngeal mucosal space, deep spaces, and infrahyoid neck otherwise unremarkable. No cervical adenopathy. Scans through the upper neck which included portion of the lower head demonstrate age-related parenchymal volume loss changes. NO RADIOPAQUE FOREIGN BODY. PROMINENT ANTERIOR CERVICAL SPURRING. FL MODIFIED BARIUM SWALLOW W VIDEO    Result Date: 10/29/2021  EXAMINATION: FL MODIFIED BARIUM SWALLOW W VIDEO DATE AND TIME:10/28/2021 2:50 PM CLINICAL HISTORY: Dysphagia. Possible aspiration. dysphagia; odynophagia  COMPARISON: None available. Technique: A modified barium swallow study was performed in the Radiology Suite in conjunction with Speech Therapy. The patient was seated upright throughout this assessment. Multiple consistencies were used to evaluate swallowing function and safety. Videofluoroscopic imaging was utilized (this is considered equivalent to one image for purposes of compliance with MIPS). Fluoroscopy time was 3.3 minutes Findings:     Oral phase: There was prolonged mastication and bolus formation with some premature spillage of barium to the level of the valleculae and  pyriform sinuses     Pharyngeal Phase: There was some pharyngeal dysfunction without evidence of penetration or aspiration. Esophageal phase: The patient's upper esophageal sphincter opens normally. There is no diverticulum stricture or fistula.            Residual:There was some vallecular and pyriform sinus residue which adequately cleared. Moderate oral and pharyngeal dysfunction without aspiration. Please refer to speech pathology  report for recommendations. EKG:   Assessment:    Active Hospital Problems    Diagnosis Date Noted    Esophageal dysphagia [R13.19]      Priority: Low    CHF (congestive heart failure), NYHA class I, acute on chronic, combined ([facility] [I50.43] 10/27/2021     Priority: Low        Atrial fibrillation       Mildly impaired left ventricular ejection fraction       Remote CABG in [address]:  1. Continue IV Cardizem 15 cc  2. And IV Lopressor every 6 hours for heart rate more than 110  3. EP consult  4.  DC [facility] [employer]  Electronically signed by Mabel Gu MD on 10/30/2021 at 1:35 PM

## 2021-10-31 LAB
ANION GAP SERPL CALCULATED.3IONS-SCNC: 11 MEQ/L (ref 9–15)
BUN BLDV-MCNC: 36 MG/DL (ref 8–23)
CALCIUM SERPL-MCNC: 8.1 MG/DL (ref 8.5–9.9)
CHLORIDE BLD-SCNC: 102 MEQ/L (ref 95–107)
CO2: 25 MEQ/L (ref 20–31)
CREAT SERPL-MCNC: 4.57 MG/DL (ref 0.7–1.2)
GFR AFRICAN AMERICAN: 14.9
GFR NON-AFRICAN AMERICAN: 12.3
GLUCOSE BLD-MCNC: 125 MG/DL (ref 60–115)
GLUCOSE BLD-MCNC: 154 MG/DL (ref 70–99)
GLUCOSE BLD-MCNC: 191 MG/DL (ref 60–115)
GLUCOSE BLD-MCNC: 198 MG/DL (ref 60–115)
GLUCOSE BLD-MCNC: 206 MG/DL (ref 60–115)
HCT VFR BLD CALC: 25.7 % (ref 42–52)
HEMOGLOBIN: 8.8 G/DL (ref 14–18)
MCH RBC QN AUTO: 32.3 PG (ref 27–31.3)
MCHC RBC AUTO-ENTMCNC: 34.2 % (ref 33–37)
MCV RBC AUTO: 94.4 FL (ref 80–100)
PDW BLD-RTO: 14.9 % (ref 11.5–14.5)
PERFORMED ON: ABNORMAL
PLATELET # BLD: 174 K/UL (ref 130–400)
POTASSIUM SERPL-SCNC: 4.1 MEQ/L (ref 3.4–4.9)
RBC # BLD: 2.72 M/UL (ref 4.7–6.1)
SODIUM BLD-SCNC: 138 MEQ/L (ref 135–144)
WBC # BLD: 6.6 K/UL (ref 4.8–10.8)

## 2021-10-31 PROCEDURE — 6370000000 HC RX 637 (ALT 250 FOR IP): Performed by: INTERNAL MEDICINE

## 2021-10-31 PROCEDURE — 99232 SBSQ HOSP IP/OBS MODERATE 35: CPT | Performed by: INTERNAL MEDICINE

## 2021-10-31 PROCEDURE — 6370000000 HC RX 637 (ALT 250 FOR IP): Performed by: NURSE PRACTITIONER

## 2021-10-31 PROCEDURE — 85027 COMPLETE CBC AUTOMATED: CPT

## 2021-10-31 PROCEDURE — 2580000003 HC RX 258: Performed by: INTERNAL MEDICINE

## 2021-10-31 PROCEDURE — 80048 BASIC METABOLIC PNL TOTAL CA: CPT

## 2021-10-31 PROCEDURE — 2580000003 HC RX 258: Performed by: SPECIALIST

## 2021-10-31 PROCEDURE — 2060000000 HC ICU INTERMEDIATE R&B

## 2021-10-31 PROCEDURE — 6360000002 HC RX W HCPCS: Performed by: INTERNAL MEDICINE

## 2021-10-31 PROCEDURE — 6360000002 HC RX W HCPCS: Performed by: SPECIALIST

## 2021-10-31 PROCEDURE — 6370000000 HC RX 637 (ALT 250 FOR IP): Performed by: SPECIALIST

## 2021-10-31 PROCEDURE — 36415 COLL VENOUS BLD VENIPUNCTURE: CPT

## 2021-10-31 RX ORDER — AMIODARONE HYDROCHLORIDE 200 MG/1
200 TABLET ORAL DAILY
Status: DISCONTINUED | OUTPATIENT
Start: 2021-10-31 | End: 2021-10-31

## 2021-10-31 RX ORDER — AMIODARONE HYDROCHLORIDE 200 MG/1
200 TABLET ORAL 2 TIMES DAILY
Status: DISCONTINUED | OUTPATIENT
Start: 2021-10-31 | End: 2021-11-03 | Stop reason: HOSPADM

## 2021-10-31 RX ORDER — FUROSEMIDE 40 MG/1
40 TABLET ORAL DAILY
Status: DISCONTINUED | OUTPATIENT
Start: 2021-10-31 | End: 2021-11-01

## 2021-10-31 RX ADMIN — ATORVASTATIN CALCIUM 20 MG: 20 TABLET, FILM COATED ORAL at 22:27

## 2021-10-31 RX ADMIN — INSULIN GLARGINE 15 UNITS: 100 INJECTION, SOLUTION SUBCUTANEOUS at 22:29

## 2021-10-31 RX ADMIN — SODIUM CHLORIDE, PRESERVATIVE FREE 10 ML: 5 INJECTION INTRAVENOUS at 22:38

## 2021-10-31 RX ADMIN — SODIUM CHLORIDE, PRESERVATIVE FREE 10 ML: 5 INJECTION INTRAVENOUS at 10:17

## 2021-10-31 RX ADMIN — DIGOXIN 125 MCG: 250 INJECTION, SOLUTION INTRAMUSCULAR; INTRAVENOUS; PARENTERAL at 01:52

## 2021-10-31 RX ADMIN — HYDRALAZINE HYDROCHLORIDE 100 MG: 100 TABLET, FILM COATED ORAL at 22:27

## 2021-10-31 RX ADMIN — Medication 10 ML: at 22:39

## 2021-10-31 RX ADMIN — CARVEDILOL 12.5 MG: 12.5 TABLET, FILM COATED ORAL at 22:28

## 2021-10-31 RX ADMIN — ASPIRIN 81 MG: 81 TABLET, CHEWABLE ORAL at 10:16

## 2021-10-31 RX ADMIN — APIXABAN 2.5 MG: 2.5 TABLET, FILM COATED ORAL at 22:27

## 2021-10-31 RX ADMIN — CARVEDILOL 12.5 MG: 12.5 TABLET, FILM COATED ORAL at 10:16

## 2021-10-31 RX ADMIN — HEPARIN SODIUM 5000 UNITS: 5000 INJECTION INTRAVENOUS; SUBCUTANEOUS at 05:47

## 2021-10-31 RX ADMIN — AMIODARONE HYDROCHLORIDE 200 MG: 200 TABLET ORAL at 13:30

## 2021-10-31 RX ADMIN — ACETAMINOPHEN 650 MG: 325 TABLET ORAL at 10:16

## 2021-10-31 RX ADMIN — HYDRALAZINE HYDROCHLORIDE 100 MG: 100 TABLET, FILM COATED ORAL at 10:27

## 2021-10-31 RX ADMIN — APIXABAN 2.5 MG: 2.5 TABLET, FILM COATED ORAL at 13:30

## 2021-10-31 RX ADMIN — SODIUM CHLORIDE, PRESERVATIVE FREE 10 ML: 5 INJECTION INTRAVENOUS at 22:39

## 2021-10-31 RX ADMIN — DIGOXIN 125 MCG: 125 TABLET ORAL at 10:16

## 2021-10-31 RX ADMIN — FUROSEMIDE 40 MG: 10 INJECTION, SOLUTION INTRAMUSCULAR; INTRAVENOUS at 10:17

## 2021-10-31 RX ADMIN — AMIODARONE HYDROCHLORIDE 200 MG: 200 TABLET ORAL at 22:27

## 2021-10-31 ASSESSMENT — ENCOUNTER SYMPTOMS
APNEA: 0
COLOR CHANGE: 0
WHEEZING: 0
TROUBLE SWALLOWING: 0
NAUSEA: 0
ANAL BLEEDING: 0
VOICE CHANGE: 0
BLOOD IN STOOL: 0
ABDOMINAL DISTENTION: 0
SHORTNESS OF BREATH: 0
CHEST TIGHTNESS: 0
VOMITING: 0
FACIAL SWELLING: 0

## 2021-10-31 ASSESSMENT — PAIN SCALES - GENERAL
PAINLEVEL_OUTOF10: 0

## 2021-10-31 NOTE — PROGRESS NOTES
Progress Note  Patient: Jarett Rios  Unit/Bed: S992/E094-73  YOB: 1935  MRN: [id number]  Acct: [de-identified]   Admitting Diagnosis: CHF (congestive heart failure), NYHA class I, acute on chronic, combined ([facility] [I50.43]  Congestive heart failure, unspecified HF chronicity, unspecified heart failure type [facility]) [I50.9]  Date:  10/27/2021  Hospital Day: 4    Chief Complaint:  Shortness of breath    Subjective  Remote CABG in [facility] 65 15 years ago. Admitted with painful swallowing. Evaluated by ENT. Underwent polyp resection. Was noted to be in rapid atrial fibrillation. Also had apparently CVA in the last 6 months and currently not driving. Looks much younger than stated age. Hemoglobin is 8.8 WBC 6.6 platelets are normal BUN is 36 creatinine 4.5. Converted to sinus rhythm. Seen by EP service yesterday started on digoxin    Review of Systems:   Review of Systems   Constitutional: Negative for activity change, appetite change, diaphoresis, fatigue and unexpected weight change. HENT: Negative for facial swelling, nosebleeds, trouble swallowing and voice change. Respiratory: Negative for apnea, chest tightness, shortness of breath and wheezing. Cardiovascular: Negative for chest pain, palpitations and leg swelling. Gastrointestinal: Negative for abdominal distention, anal bleeding, blood in stool, nausea and vomiting. Genitourinary: Negative for decreased urine volume and dysuria. Musculoskeletal: Negative for gait problem and myalgias. Skin: Negative for color change, pallor, rash and wound. Neurological: Negative for dizziness, syncope, facial asymmetry, weakness, light-headedness, numbness and headaches. Hematological: Does not bruise/bleed easily. Psychiatric/Behavioral: Negative for agitation, behavioral problems, confusion, hallucinations and suicidal ideas. The patient is not nervous/anxious and is not hyperactive.     All other systems reviewed and are negative. Physical Examination:    BP (!) 149/98   Pulse 99   Temp 100.4 °F (38 °C)   Resp 18   Ht 5' 9\" (1.753 m)   Wt 216 lb (98 kg)   SpO2 92%   BMI 31.90 kg/m²    Physical Exam  Constitutional:       Appearance: He is well-developed. Cardiovascular:      Rate and Rhythm: Normal rate and regular rhythm. Heart sounds: Normal heart sounds. Pulmonary:      Effort: Pulmonary effort is normal.      Breath sounds: Normal breath sounds. Musculoskeletal:         General: Normal range of motion. Skin:     General: Skin is warm and dry. Neurological:      Mental Status: He is alert and oriented to person, place, and time. Deep Tendon Reflexes: Reflexes are normal and symmetric. Psychiatric:         Behavior: Behavior normal.         Thought Content:  Thought content normal.         Judgment: Judgment normal.         LABS:  CBC:   Lab Results   Component Value Date    WBC 6.6 10/31/2021    RBC 2.72 10/31/2021    HGB 8.8 10/31/2021    HCT 25.7 10/31/2021    MCV 94.4 10/31/2021    MCH 32.3 10/31/2021    MCHC 34.2 10/31/2021    RDW 14.9 10/31/2021     10/31/2021     CBC with Differential:   Lab Results   Component Value Date    WBC 6.6 10/31/2021    RBC 2.72 10/31/2021    HGB 8.8 10/31/2021    HCT 25.7 10/31/2021     10/31/2021    MCV 94.4 10/31/2021    MCH 32.3 10/31/2021    MCHC 34.2 10/31/2021    RDW 14.9 10/31/2021    LYMPHOPCT 16.5 10/27/2021    MONOPCT 7.4 10/27/2021    BASOPCT 0.6 10/27/2021    MONOSABS 0.5 10/27/2021    LYMPHSABS 1.0 10/27/2021    EOSABS 0.1 10/27/2021    BASOSABS 0.0 10/27/2021     CMP:    Lab Results   Component Value Date     10/31/2021    K 4.1 10/31/2021     10/31/2021    CO2 25 10/31/2021    BUN 36 10/31/2021    CREATININE 4.57 10/31/2021    GFRAA 14.9 10/31/2021    LABGLOM 12.3 10/31/2021    GLUCOSE 154 10/31/2021    GLUCOSE 228 06/15/2021    PROT 6.2 10/27/2021    LABALBU 3.3 10/27/2021    LABALBU 3.3 06/15/2021    CALCIUM 8.1 10/31/2021    BILITOT 0.7 10/27/2021    ALKPHOS 113 10/27/2021    AST 15 10/27/2021    ALT 10 10/27/2021     BMP:    Lab Results   Component Value Date     10/31/2021    K 4.1 10/31/2021     10/31/2021    CO2 25 10/31/2021    BUN 36 10/31/2021    LABALBU 3.3 10/27/2021    LABALBU 3.3 06/15/2021    CREATININE 4.57 10/31/2021    CALCIUM 8.1 10/31/2021    GFRAA 14.9 10/31/2021    LABGLOM 12.3 10/31/2021    GLUCOSE 154 10/31/2021    GLUCOSE 228 06/15/2021     Magnesium:    Lab Results   Component Value Date    MG 2.0 10/27/2021     Troponin:    Lab Results   Component Value Date    TROPONINI 0.100 10/30/2021       Radiology:  No results found. EKG:  Assessment:    Active Hospital Problems    Diagnosis Date Noted    Esophageal dysphagia [R13.19]      Priority: Low    CHF (congestive heart failure), NYHA class I, acute on chronic, combined ([facility] [I50.43] 10/27/2021     Priority: Low        Remote CABG in [facility]       Renal insufficiency        Atrial fib        Anticoagulated lower dose because of renal dysfunction        Elevated troponin from type II MI secondary to renal failure        Plan:  1. Discontinue Cardizem IV  2. Continue p.o. dig  3. Continue p.o. beta-blocker  4. Continue anticoagulation  5. Reduce dose of statin in view of renal dysfunction  6. Amiodarone  7.  Eliquis 2.5 twice daily  Electronically signed by Mabel Gu MD on 10/31/2021 at 11:26 AM

## 2021-10-31 NOTE — CARE COORDINATION
RECEIVED CALL FROM COLLEEN AT [place] [id number]. HE CONFIRMED REFERRAL. HE TO FOLLOW UP WITH REFUGIO ANDRES ON Monday MORNING.

## 2021-10-31 NOTE — CARE COORDINATION
MET WITH PT, WIFE, AND DTRS. DISCUSSED DISCHARGE PLANNING. FREEDOM OF CHOICE GIVEN. DTR [facility]. SHE STATES THAT ULTIMATELY [facility] W[facility] BUT AT THIS TIME THEY ARE AGREEABLE TO [facility] FOR REHABILITATION. [facility]XED ADRI[facility] TO [facility]. PHONED KALIE CORLEY TO NOTIFY OF REFERRAL. THIS CM SPOKE WITH LUCERO, SHE TO RELAY MSG TO [facility]. FAMILY DID EXPRESS CONCERN OF PATIENT'S INCREASED LETHARGY AND SLEEPINESS. PT AWOKE DURING OUR MEETING AND IS ORIENTED TO FAMILY AND SELF. HE WAS REMINDED HE WAS AT [facility]. WE DISCUSSED PARTICIPATING IN PT/OT.

## 2021-10-31 NOTE — PROGRESS NOTES
Hospitalist Progress Note      PCP: Rema Nesbitt DO    Date of Admission: 10/27/2021    Chief Complaint:    Chief Complaint   Patient presents with    Shortness of Breath     Subjective:  Patient feels like his breathing is back to baseline; denies fevers, chills, sweats. 12 point ROS negative other than mentioned above     Medications:  Reviewed    Infusion Medications    dilTIAZem (CARDIZEM) 125 mg in dextrose 5% 125 mL infusion Stopped (10/31/21 0830)    sodium chloride      sodium chloride      dextrose       Scheduled Medications    atorvastatin  20 mg Oral Nightly    carvedilol  12.5 mg Oral BID    digoxin  125 mcg IntraVENous Q6H    digoxin  125 mcg Oral Every Other Day    sodium chloride flush  5-40 mL IntraVENous 2 times per day    furosemide  40 mg IntraVENous Daily    hydrALAZINE  100 mg Oral 2 times per day    aspirin  81 mg Oral Daily    sodium chloride flush  5-40 mL IntraVENous 2 times per day    heparin (porcine)  5,000 Units SubCUTAneous 3 times per day    insulin glargine  0.15 Units/kg SubCUTAneous Nightly    insulin lispro  0-12 Units SubCUTAneous TID WC    insulin lispro  0-6 Units SubCUTAneous Nightly     PRN Meds: metoprolol, sodium chloride flush, sodium chloride, benzocaine-menthol, phenylephrine, sodium chloride flush, sodium chloride, acetaminophen, ondansetron **OR** ondansetron, glucose, dextrose, glucagon (rDNA), dextrose      Intake/Output Summary (Last 24 hours) at 10/31/2021 1129  Last data filed at 10/31/2021 0830  Gross per 24 hour   Intake    Output 150 ml   Net -150 ml     Exam:    BP (!) 149/98   Pulse 99   Temp 100.4 °F (38 °C)   Resp 18   Ht 5' 9\" (1.753 m)   Wt 216 lb (98 kg)   SpO2 92%   BMI 31.90 kg/m²     General appearance: No apparent distress, appears stated age and cooperative. HEENT: Conjunctivae/corneas clear. Neck: Trachea midline. Respiratory:  Normal respiratory effort.  Clear to auscultation  Cardiovascular: Regular rate and rhythm Abdomen: Soft, non-tender, non-distended with normal bowel sounds. Musculoskeletal: Trace edema  Neuro: Non Focal.   Capillary Refill: Brisk,< 3 seconds   Peripheral Pulses: +2 palpable, equal bilaterally     Labs:   Recent Labs     10/29/21  0656 10/30/21  0903 10/31/21  0747   WBC 5.4 6.3 6.6   HGB 9.8* 9.5* 8.8*   HCT 27.8* 28.3* 25.7*    194 174     Recent Labs     10/29/21  0656 10/30/21  0903 10/31/21  0747   * 136 138   K 4.5 4.0 4.1   * 101 102   CO2 26 26 25   BUN 32* 33* 36*   CREATININE 4.00* 4.26* 4.57*   CALCIUM 8.5 8.0* 8.1*     No results for input(s): AST, ALT, BILIDIR, BILITOT, ALKPHOS in the last 72 hours. No results for input(s): INR in the last 72 hours. Recent Labs     10/30/21  1341   TROPONINI 0.100*     Urinalysis:      Lab Results   Component Value Date    NITRU NEGATIVE 03/25/2021    WBCUA 2 03/25/2021    BACTERIA 1+ 03/25/2021    RBCUA 1 03/25/2021    BLOODU NEGATIVE 03/25/2021     Radiology:  CT SOFT TISSUE NECK WO CONTRAST   Preliminary Result   NO RADIOPAQUE FOREIGN BODY. PROMINENT ANTERIOR CERVICAL SPURRING. FL MODIFIED BARIUM SWALLOW W VIDEO   Final Result   Moderate oral and pharyngeal dysfunction without aspiration. Please refer to speech pathology  report for recommendations. CT CHEST WO CONTRAST   Final Result      1. CHF   2. Bilateral pleural effusions with atelectasis and/or infiltrate   3. Enlarged main pulmonary artery. Reformatted pulmonary arterial hypertension         All CT scans at this facility use dose modulation, iterative reconstruction, and/or weight based dosing when appropriate to reduce radiation dose to as low as reasonably achievable. Assessment/Plan:    1. Acute on chronic decompensated CHF: Management per primary team; improved with diuresis  2. Acute hypoxic respiratory failure: Secondary to #1. Titrate oxygen to maintain sats >92%; on 2L NC now from 4L yesterday  3.  CAD with CABG x5: on ASA, BB, and statin  4. SHANDRA on CKD stage IV: Continue diuresis; likely cardiorenal syndrome  5. A Fib with RVR:  Seen by EP; now on digoxin; discussed with cardiology who are going to start anticoagulation today  6. DM type II: Continue lantus and SSI; doing well  7. Remote CVA: PT/oT  8. Dysphagia/Odynophagia:  Resolved    Active Hospital Problems    Diagnosis Date Noted    Esophageal dysphagia [R13.19]     CHF (congestive heart failure), NYHA class I, acute on chronic, combined ([facility] [I50.43] 10/27/2021     Additional work up or/and treatment plan may be added today or then after based on clinical progression. I am managing a portion of pt care. Some medical issues are handled by other specialists. Additional work up and treatment should be done in out pt setting by pt PCP and other out pt providers. In addition to examining and evaluating pt, I spent additional time explaining care, normal and abnormal findings, and treatment plan. All of pt questions were answered. Counseling, diet and education were  provided. Case will be discussed with nursing staff when appropriate. Family will be updated if and when appropriate. Diet: ADULT DIET; Regular; 4 carb choices (60 gm/meal);  Low Sodium (2 gm); advance as tolerated    Code Status: Full Code    PT/OT Eval     Electronically signed by Fela Rod MD on 10/31/2021 at 11:29 AM

## 2021-10-31 NOTE — DISCHARGE INSTR - COC
Wt 216 lb (98 kg)   SpO2 92%   BMI 31.90 kg/m²     Last documented pain score (0-10 scale): Pain Level: 0  Last Weight:   Wt Readings from Last 1 Encounters:   10/29/21 216 lb (98 kg)     Mental Status:  oriented, alert, coherent, logical, thought processes intact and able to concentrate and follow conversation    IV Access:  - None    Nursing Mobility/ADLs:  Walking   Assisted  Transfer  Assisted  Bathing  Assisted  Dressing  Assisted  Toileting  Assisted  Feeding  Independent  Med Cele Passer  Assisted  Med Delivery   whole    Wound Care Documentation and Therapy:        Elimination:  Continence:   · Bowel: Yes  · Bladder: Yes  Urinary Catheter: None   Colostomy/Ileostomy/Ileal Conduit: No       Date of Last BM: 11/2/21    Intake/Output Summary (Last 24 hours) at 10/31/2021 1608  Last data filed at 10/31/2021 0830  Gross per 24 hour   Intake    Output 150 ml   Net -150 ml     I/O last 3 completed shifts:  In: -   Out: 150 [Urine:150]    Safety Concerns: At Risk for Falls    Impairments/Disabilities:      Speech - aspiration precautions     Nutrition Therapy:  Current Nutrition Therapy:   - Oral Diet:  Carb Control 4 carbs/meal (1800kcals/day)    Routes of Feeding: Oral  Liquids: Thin Liquids  Daily Fluid Restriction: no  Last Modified Barium Swallow with Video (Video Swallowing Test): completed per speech therapy     Treatments at the Time of Hospital Discharge:   Respiratory Treatments: ***  Oxygen Therapy:  is on oxygen at 4 L/min per nasal cannula.   Ventilator:    - No ventilator support    Rehab Therapies: Physical Therapy, Occupational Therapy and Speech/Language Therapy  Weight Bearing Status/Restrictions: No weight bearing restirctions  Other Medical Equipment (for information only, NOT a DME order):  walker and hospital bed  Other Treatments: ***    Patient's personal belongings (please select all that are sent with patient):  Dentures {DESC; UPPER/LOWER/BOTH:72821}    RN SIGNATURE:  Electronically signed by Nury Garcia RN on 11/3/21 at 4:16 PM EDT    CASE MANAGEMENT/SOCIAL WORK SECTION    Inpatient Status Date: 10/27/2021    Readmission Risk Assessment Score:  Readmission Risk              Risk of Unplanned Readmission:  17           Discharging to Facility/ Agency   · Name: Екатерина Mohr  · Address:  · Phone:[phone]  · Fax:    Dialysis Facility (if applicable)   · Name:  · Address:  · Dialysis Schedule:  · Phone:  · Fax:    / signature: Electronically signed by Alejandro Barrera RN on 10/31/21 at 4:08 PM EDT    PHYSICIAN SECTION    Prognosis: Fair    Condition at Discharge: Stable    Rehab Potential (if transferring to Rehab): Fair    Recommended Labs or Other Treatments After Discharge: CBC BMP    Physician Certification: I certify the above information and transfer of Ankita Iyer  is necessary for the continuing treatment of the diagnosis listed and that he requires skilled level of care for less than 30 days.      Update Admission H&P: No change in H&P    PHYSICIAN SIGNATURE:  Electronically signed by Juliann Acevedo MD on 11/2/21 at 8:16 AM EDT

## 2021-10-31 NOTE — FLOWSHEET NOTE
2115: Call from monitor room; pt's HR in the 60's. Cardizem gtt decreased to 5mg/hr as per order. 0015: Current HR=88; Cardizem gtt continued at 5mg/hr. 0300: DT=233    0600: EB=634; Cardizem gtt increased to 10mg/hr.      Electronically signed by Nettie Talley RN on 10/31/2021 at 6:27 AM

## 2021-11-01 LAB
ANION GAP SERPL CALCULATED.3IONS-SCNC: 9 MEQ/L (ref 9–15)
BUN BLDV-MCNC: 38 MG/DL (ref 8–23)
CALCIUM SERPL-MCNC: 8 MG/DL (ref 8.5–9.9)
CHLORIDE BLD-SCNC: 101 MEQ/L (ref 95–107)
CO2: 28 MEQ/L (ref 20–31)
CREAT SERPL-MCNC: 5.02 MG/DL (ref 0.7–1.2)
GFR AFRICAN AMERICAN: 13.3
GFR NON-AFRICAN AMERICAN: 11
GLUCOSE BLD-MCNC: 124 MG/DL (ref 70–99)
GLUCOSE BLD-MCNC: 126 MG/DL (ref 60–115)
GLUCOSE BLD-MCNC: 169 MG/DL (ref 60–115)
GLUCOSE BLD-MCNC: 228 MG/DL (ref 60–115)
HCT VFR BLD CALC: 25.6 % (ref 42–52)
HEMOGLOBIN: 8.9 G/DL (ref 14–18)
MCH RBC QN AUTO: 32.3 PG (ref 27–31.3)
MCHC RBC AUTO-ENTMCNC: 34.9 % (ref 33–37)
MCV RBC AUTO: 92.5 FL (ref 80–100)
PDW BLD-RTO: 14.7 % (ref 11.5–14.5)
PERFORMED ON: ABNORMAL
PLATELET # BLD: 172 K/UL (ref 130–400)
POTASSIUM SERPL-SCNC: 4.3 MEQ/L (ref 3.4–4.9)
RBC # BLD: 2.76 M/UL (ref 4.7–6.1)
SODIUM BLD-SCNC: 138 MEQ/L (ref 135–144)
WBC # BLD: 5.2 K/UL (ref 4.8–10.8)

## 2021-11-01 PROCEDURE — 6370000000 HC RX 637 (ALT 250 FOR IP): Performed by: INTERNAL MEDICINE

## 2021-11-01 PROCEDURE — 6370000000 HC RX 637 (ALT 250 FOR IP): Performed by: SPECIALIST

## 2021-11-01 PROCEDURE — 97535 SELF CARE MNGMENT TRAINING: CPT

## 2021-11-01 PROCEDURE — 2060000000 HC ICU INTERMEDIATE R&B

## 2021-11-01 PROCEDURE — 97112 NEUROMUSCULAR REEDUCATION: CPT

## 2021-11-01 PROCEDURE — 6370000000 HC RX 637 (ALT 250 FOR IP): Performed by: NURSE PRACTITIONER

## 2021-11-01 PROCEDURE — 92526 ORAL FUNCTION THERAPY: CPT

## 2021-11-01 PROCEDURE — 99233 SBSQ HOSP IP/OBS HIGH 50: CPT | Performed by: INTERNAL MEDICINE

## 2021-11-01 PROCEDURE — 36415 COLL VENOUS BLD VENIPUNCTURE: CPT

## 2021-11-01 PROCEDURE — 85027 COMPLETE CBC AUTOMATED: CPT

## 2021-11-01 PROCEDURE — 2580000003 HC RX 258: Performed by: SPECIALIST

## 2021-11-01 PROCEDURE — 80048 BASIC METABOLIC PNL TOTAL CA: CPT

## 2021-11-01 RX ADMIN — HYDRALAZINE HYDROCHLORIDE 100 MG: 100 TABLET, FILM COATED ORAL at 09:35

## 2021-11-01 RX ADMIN — SODIUM CHLORIDE, PRESERVATIVE FREE 10 ML: 5 INJECTION INTRAVENOUS at 22:15

## 2021-11-01 RX ADMIN — AMIODARONE HYDROCHLORIDE 200 MG: 200 TABLET ORAL at 22:21

## 2021-11-01 RX ADMIN — SODIUM CHLORIDE, PRESERVATIVE FREE 10 ML: 5 INJECTION INTRAVENOUS at 22:22

## 2021-11-01 RX ADMIN — APIXABAN 2.5 MG: 2.5 TABLET, FILM COATED ORAL at 09:35

## 2021-11-01 RX ADMIN — CARVEDILOL 12.5 MG: 12.5 TABLET, FILM COATED ORAL at 22:21

## 2021-11-01 RX ADMIN — HYDRALAZINE HYDROCHLORIDE 100 MG: 100 TABLET, FILM COATED ORAL at 22:23

## 2021-11-01 RX ADMIN — AMIODARONE HYDROCHLORIDE 200 MG: 200 TABLET ORAL at 09:35

## 2021-11-01 RX ADMIN — INSULIN GLARGINE 15 UNITS: 100 INJECTION, SOLUTION SUBCUTANEOUS at 22:16

## 2021-11-01 RX ADMIN — CARVEDILOL 12.5 MG: 12.5 TABLET, FILM COATED ORAL at 09:35

## 2021-11-01 RX ADMIN — ATORVASTATIN CALCIUM 20 MG: 20 TABLET, FILM COATED ORAL at 22:21

## 2021-11-01 RX ADMIN — APIXABAN 2.5 MG: 2.5 TABLET, FILM COATED ORAL at 22:21

## 2021-11-01 ASSESSMENT — ENCOUNTER SYMPTOMS
STRIDOR: 0
CHEST TIGHTNESS: 0
DIARRHEA: 0
SHORTNESS OF BREATH: 0
BLOOD IN STOOL: 0
WHEEZING: 0
GASTROINTESTINAL NEGATIVE: 1
NAUSEA: 0
EYES NEGATIVE: 1
SHORTNESS OF BREATH: 1
COUGH: 0

## 2021-11-01 ASSESSMENT — PAIN SCALES - GENERAL
PAINLEVEL_OUTOF10: 0

## 2021-11-01 NOTE — PROGRESS NOTES
Progress Note  Patient: Lenn Dancer  Unit/Bed: U903/N293-00  YOB: 1935  MRN: [id number]  Acct: [de-identified]   Admitting Diagnosis: CHF (congestive heart failure), NYHA class I, acute on chronic, combined ([facility] [I50.43]  Congestive heart failure, unspecified HF chronicity, unspecified heart failure type ([facility] [I50.9]  Admit Date:  10/27/2021  Hospital Day: 5    Chief Complaint: CHF    Histories:  Past Medical History:   Diagnosis Date    CAD (coronary artery disease)     Hyperlipidemia      Past Surgical History:   Procedure Laterality Date    CARDIAC SURGERY      UPPER GASTROINTESTINAL ENDOSCOPY N/A 10/29/2021    EGD BIOPSY performed by Teresa Esparza MD at [facility]     History reviewed. No pertinent family history. Social History     Socioeconomic History    Marital status:      Spouse name: None    Number of children: None    Years of education: None    Highest education level: None   Occupational History    None   Tobacco Use    Smoking status: Never Smoker    Smokeless tobacco: Never Used   Vaping Use    Vaping Use: Never used   Substance and Sexual Activity    Alcohol use: Not Currently    Drug use: Not Currently    Sexual activity: Not Currently   Other Topics Concern    None   Social History Narrative    None     Social Determinants of Health     Financial Resource Strain:     Difficulty of Paying Living Expenses:    Food Insecurity:     Worried About Running Out of Food in the Last Year:     Ran Out of Food in the Last Year:    Transportation Needs:     Lack of Transportation (Medical):      Lack of Transportation (Non-Medical):    Physical Activity:     Days of Exercise per Week:     Minutes of Exercise per Session:    Stress:     Feeling of Stress :    Social Connections:     Frequency of Communication with Friends and Family:     Frequency of Social Gatherings with Friends and Family:     Attends Hindu Services:     Active Member of [address] [place] [address] or Organizations:     Attends Club or Organization Meetings:     Marital Status:    Intimate Partner Violence:     Fear of Current or Ex-Partner:     Emotionally Abused:     Physically Abused:     Sexually Abused:        Subjective/HPI breathing is better. Slept well. No cp. Odynophagia -resolved. Walked to bathroom w assist to have BM yesterday    EKG: SR 88        Review of Systems:   Review of Systems   Constitutional: Negative. Negative for diaphoresis and fatigue. HENT: Negative. Eyes: Negative. Respiratory: Positive for shortness of breath. Negative for cough, chest tightness, wheezing and stridor. Cardiovascular: Positive for leg swelling. Negative for chest pain and palpitations. Gastrointestinal: Negative. Negative for blood in stool and nausea. Genitourinary: Negative. Musculoskeletal: Negative. Skin: Negative. Neurological: Negative. Negative for dizziness, syncope, weakness and light-headedness. Hematological: Negative. Psychiatric/Behavioral: Negative. Physical Examination:    BP (!) 165/54   Pulse 79   Temp 98.8 °F (37.1 °C)   Resp 18   Ht 5' 9\" (1.753 m)   Wt 216 lb (98 kg)   SpO2 92%   BMI 31.90 kg/m²    Physical Exam   Constitutional: He appears healthy. No distress. HENT:   Normal cephalic and Atraumatic   Eyes: Pupils are equal, round, and reactive to light. Neck: Thyroid normal. No JVD present. No neck adenopathy. No thyromegaly present. Cardiovascular: Normal rate, regular rhythm, normal heart sounds, intact distal pulses and normal pulses. Pulmonary/Chest: Effort normal and breath sounds normal. He has no wheezes. He has no rales. He exhibits no tenderness. Abdominal: Soft. Bowel sounds are normal. There is no abdominal tenderness. Musculoskeletal:         General: Edema (mild) present. No tenderness. Normal range of motion. Cervical back: Normal range of motion and neck supple.    Neurological: He is alert and oriented to person, place, and time. Skin: Skin is warm. No cyanosis. Nails show no clubbing.        LABS:  CBC:   Lab Results   Component Value Date    WBC 5.2 11/01/2021    RBC 2.76 11/01/2021    HGB 8.9 11/01/2021    HCT 25.6 11/01/2021    MCV 92.5 11/01/2021    MCH 32.3 11/01/2021    MCHC 34.9 11/01/2021    RDW 14.7 11/01/2021     11/01/2021     CBC with Differential:    Lab Results   Component Value Date    WBC 5.2 11/01/2021    RBC 2.76 11/01/2021    HGB 8.9 11/01/2021    HCT 25.6 11/01/2021     11/01/2021    MCV 92.5 11/01/2021    MCH 32.3 11/01/2021    MCHC 34.9 11/01/2021    RDW 14.7 11/01/2021    LYMPHOPCT 16.5 10/27/2021    MONOPCT 7.4 10/27/2021    BASOPCT 0.6 10/27/2021    MONOSABS 0.5 10/27/2021    LYMPHSABS 1.0 10/27/2021    EOSABS 0.1 10/27/2021    BASOSABS 0.0 10/27/2021     CMP:    Lab Results   Component Value Date     11/01/2021    K 4.3 11/01/2021     11/01/2021    CO2 28 11/01/2021    BUN 38 11/01/2021    CREATININE 5.02 11/01/2021    GFRAA 13.3 11/01/2021    LABGLOM 11.0 11/01/2021    GLUCOSE 124 11/01/2021    GLUCOSE 228 06/15/2021    PROT 6.2 10/27/2021    LABALBU 3.3 10/27/2021    LABALBU 3.3 06/15/2021    CALCIUM 8.0 11/01/2021    BILITOT 0.7 10/27/2021    ALKPHOS 113 10/27/2021    AST 15 10/27/2021    ALT 10 10/27/2021     BMP:    Lab Results   Component Value Date     11/01/2021    K 4.3 11/01/2021     11/01/2021    CO2 28 11/01/2021    BUN 38 11/01/2021    LABALBU 3.3 10/27/2021    LABALBU 3.3 06/15/2021    CREATININE 5.02 11/01/2021    CALCIUM 8.0 11/01/2021    GFRAA 13.3 11/01/2021    LABGLOM 11.0 11/01/2021    GLUCOSE 124 11/01/2021    GLUCOSE 228 06/15/2021     Magnesium:    Lab Results   Component Value Date    MG 2.0 10/27/2021     Troponin:    Lab Results   Component Value Date    TROPONINI 0.100 10/30/2021        Active Hospital Problems    Diagnosis Date Noted    Esophageal dysphagia [R13.19]      Priority: Low    CHF (congestive heart failure), NYHA class I, acute on chronic, combined ([facility] [I50.43] 10/27/2021     Priority: Low        Assessment/Plan:  1. Acute on Chronic Combined Decompensted CHF -  Diuretics on hold by Nephrology. Did diurese well though. 2. PAF- was started on Amiodarone and DIg. + Low dose Eliquis. Converted to SR.   3. LVEF 45%  4. CKD- worse. 5. CABG-x5 - ASA BB Statin. 6. DM- on Insulin  7. Odynophagia- ENT evaluation negative. Gastric Polyp found and removed. There were no Esophageal pathology. Symptoms resolved. 8. HPL - high dose Statin  9. PTOT  10. Precert for SNF but pt prefers to go home.         Electronically signed by Ryan Zavala MD on 11/1/2021 at 8:25 AM

## 2021-11-01 NOTE — CARE COORDINATION
The LSW called and talked to erika Ojeda at [facility]. The LSW provided the date of birth and social security number for Mikel Levy. Mikel Nicole, states if the patient is accepted, the patient will need a precert for SNF. Electronically signed by Jessica Mahajan on 11/1/21 at 11:00 AM EDT    The LSW met with the patient and his daughter. The LSW updated the patient and his daughter that she spoke to erika Ojeda at [facility], this morning. [facility] is reviewing the referral. Electronically signed by REFUGIO Jin on 11/1/21 at 11:34 AM EDT    Per erika Ojeda at [facility], [facility] does not have a male bed available. The LSW met with the patient and his daughter. The patient's daughter would like [facility] as she lives close to [facility]. The LSW faxed the face sheet to [facility]. Electronically signed by Jessica Mahajan on 11/1/21 at 3:13 PM EDT    Per Nolberto Hong, the patient has been accepted at [facility]. [place] to start the precert today. The LSW updated the patient's wife and daughter.  DHAVAL needs signed by the cardiologist.  Electronically signed by REFUGIO Jin on 11/1/21 at 3:19 PM EDT

## 2021-11-01 NOTE — PROGRESS NOTES
Physical Therapy Med Surg Daily Treatment Note  [facility]  [facility]       NAME: Danielle Reyes  : 1935 (81 y.o.)  MRN: [id number]  CODE STATUS: Full Code    Date of Service: 2021    Patient Diagnosis(es): CHF (congestive heart failure), NYHA class I, acute on chronic, combined ([facility] [I50.43]  Congestive heart failure, unspecified HF chronicity, unspecified heart failure type ([facility] [I50.9]   Chief Complaint   Patient presents with    Shortness of Breath     Patient Active Problem List    Diagnosis Date Noted    Esophageal dysphagia     CHF (congestive heart failure), NYHA class I, acute on chronic, combined ([facility] 10/27/2021        Past Medical History:   Diagnosis Date    CAD (coronary artery disease)     Hyperlipidemia      Past Surgical History:   Procedure Laterality Date    CARDIAC SURGERY      UPPER GASTROINTESTINAL ENDOSCOPY N/A 10/29/2021    EGD BIOPSY performed by Bettie Chen MD at [facility]       Restrictions  Restrictions/Precautions: Fall Risk (full code)    SUBJECTIVE   General  Chart Reviewed: Yes  Family / Caregiver Present: Yes (daughter)  Subjective  Subjective: \"I've been tired. \"    Pre-Session Pain Report  Pre Treatment Pain Screening  Pain at present: 0  Intervention List: Patient able to continue with treatment  Pain Screening  Patient Currently in Pain: Denies       Post-Session Pain Report  Pain Assessment  Pain Level: 0         OBJECTIVE        Bed mobility  Supine to Sit: Minimal assistance; Moderate assistance (assist with trunk)  Sit to Supine: Minimal assistance; Moderate assistance (assist with BLE and trunk)  Scooting: Contact guard assistance  Comment: HOB elevated during supine > sit, HOB flat during sit > supine. Pt utilizing this PTA for leverage. Transfers  Sit to Stand: Minimal Assistance  Stand to sit: Contact guard assistance  Comment: VC's for hand placement when utilizing [employer].  Min A for sit > stand for anterior weight shift. Pt c/o dizziness post standing. Pt able to maintain standing x3 min    Ambulation  Ambulation?: No (unsafe to attempt this session d/t heavy R lean sitting EOB and dizziness upon standing)    Neuromuscular Education  PNF: Static standing at [employer], lateral weightshifting. Neuromuscular Comments: Static sitting balance with SBA occ CGA to correct midline as pt tends to lean to R. Dynamic sitting balance with fwd reaching OOBOS in all planes. Requires SBA/CGA during activity. Pt neglects to look to L.        ASSESSMENT   Assessment: Pt requiring increased assist from last session. Pt exhibits R lean while sitting EOB with tc's and CGA to correct. Pt able to maintain midline ~1 min before returning to R lean. Pt experiencing dizziness post standing activities. BP taken for a reading of 154/60 SpO2 95%. Dizziness subsiding when returned to supine. Frequent redirection required along with increased time and effort to perform all tasks. Discharge Recommendations:  Continue to assess pending progress    Goals  Short term goals  Short term goal 1: Patient will perform bed mobility with HOB flat with modified independence. Short term goal 2: Patient will perform sit<>stand & stand pivot transfers with LRD with modified independence. Short term goal 3: Patient will ambulate 48' with LRD with modified independence. Short term goal 4: Patient will tolerate 5 minutes of standing functional mobility tasks consistently. Patient Goals   Patient goals : \"To figure out what is going on with my throat\"    PLAN    Times per week: 3-6  Plan Comment: Cont. POC  Safety Devices  Type of devices:  All fall risk precautions in place, Bed alarm in place, Call light within reach, Left in bed     [facility] (6 CLICK) Adin Daily 28 Inpatient Mobility Raw Score : 18   Therapy Time   Individual   Time In 1034   Time Out 1100   Minutes 26      BM/Trsf: 16  NMR: Swillow Norman 95, PTA, 11/01/21 at 11:10 AM Definitions for assistance levels  Independent = pt does not require any physical supervision or assistance from another person for activity completion. Device may be needed.   Stand by assistance = pt requires verbal cues or instructions from another person, close to but not touching, to perform the activity  Minimal assistance= pt performs 75% or more of the activity; assistance is required to complete the activity  Moderate assistance= pt performs 50% of the activity; assistance is required to complete the activity  Maximal assistance = pt performs 25% of the activity; assistance is required to complete the activity  Dependent = pt requires total physical assistance to accomplish the task

## 2021-11-01 NOTE — PROGRESS NOTES
Progress Note  Date:2021       [facility]  Patient Mónica Ryan     YOB: 1935     Age:86 y.o. Subjective    Subjective:  Symptoms:  He reports weakness. No shortness of breath, malaise, cough, chest pain, chest pressure, anorexia, diarrhea or anxiety. Review of Systems   Respiratory: Negative for cough and shortness of breath. Cardiovascular: Negative for chest pain. Gastrointestinal: Negative for anorexia and diarrhea. Neurological: Positive for weakness. Objective         Vitals Last 24 Hours:  TEMPERATURE:  Temp  Av.8 °F (37.1 °C)  Min: 98.8 °F (37.1 °C)  Max: 98.8 °F (37.1 °C)  RESPIRATIONS RANGE: Resp  Av  Min: 18  Max: 20  PULSE OXIMETRY RANGE: SpO2  Av %  Min: 91 %  Max: 91 %  PULSE RANGE: Pulse  Av.3  Min: 75  Max: 81  BLOOD PRESSURE RANGE: Systolic (11YKF), EBT:768 , Min:153 , CXY:139   ; Diastolic (61MVB), XZS:52, Min:54, Max:56    I/O (24Hr): Intake/Output Summary (Last 24 hours) at 2021 1238  Last data filed at 2021 0731  Gross per 24 hour   Intake    Output 1 ml   Net -1 ml     Objective:  General Appearance:  Comfortable, well-appearing and in no acute distress. Vital signs: (most recent): Blood pressure (!) 162/56, pulse 81, temperature 98.8 °F (37.1 °C), temperature source Oral, resp. rate 20, height 5' 9\" (1.753 m), weight 216 lb (98 kg), SpO2 91 %. HEENT: Normal HEENT exam.    Lungs:  Normal effort. Heart: Normal rate. Regular rhythm. Abdomen: Abdomen is soft. Bowel sounds are normal.   There is no epigastric area or suprapubic area tenderness. Pulses: Distal pulses are intact. Neurological: Patient is alert. Pupils:  Pupils are equal, round, and reactive to light. Skin:  Warm.       Labs/Imaging/Diagnostics    Labs:  CBC:  Recent Labs     10/30/21  0903 10/31/21  0747 21  0707   WBC 6.3 6.6 5.2   RBC 3.00* 2.72* 2.76*   HGB 9.5* 8.8* 8.9*   HCT 28.3* 25.7* 25.6*   MCV 94.6 94.4 92.5   RDW 15.2* 14.9* 14.7*    174 172     CHEMISTRIES:  Recent Labs     10/30/21  0903 10/31/21  0747 11/01/21  0707    138 138   K 4.0 4.1 4.3    102 101   CO2 26 25 28   BUN 33* 36* 38*   CREATININE 4.26* 4.57* 5.02*   GLUCOSE 203* 154* 124*     PT/INR:No results for input(s): PROTIME, INR in the last 72 hours. APTT:No results for input(s): APTT in the last 72 hours. LIVER PROFILE:No results for input(s): AST, ALT, BILIDIR, BILITOT, ALKPHOS in the last 72 hours. Imaging Last 24 Hours:  No results found. Assessment//Plan           Hospital Problems         Last Modified POA    CHF (congestive heart failure), NYHA class I, acute on chronic, combined ([facility] 10/27/2021 Yes    Esophageal dysphagia 10/29/2021 Yes      Acute on chroicn CHF  SHANDRA on CK stage IV  afib with RVR  DM2    Assessment & Plan  11/1: Lower extremity swelling is less compared to before, continue with current management. Denies any needs. Feeling better. No overnight events.   Continue oxygen therapy to keep oxygen above 92%, diabetes is stable  Electronically signed by Demetrius Patel MD on 11/1/21 at 12:38 PM EDT

## 2021-11-01 NOTE — PROGRESS NOTES
[facility]  Facility/Department: [facility]  Speech Language Pathology   Treatment Note          Amberly Huitron  1935  [id number]    Medical Dx: CHF (congestive heart failure), NYHA class I, acute on chronic, combined ([facility] [I50.43]  Congestive heart failure, unspecified HF chronicity, unspecified heart failure type ([facility] [I50.9]  Speech Dx: Dysphagia     11/1/2021    Subjective:  Alert and Cooperative        Interventions used this date:  Therapeutic Meal Monitor    Objective/Assessment:  Patient progressing towards goals:  Goal 4: Pt will tolerate the recommended diet level without clinical s/s of aspiration. Per chart review, 2 polyps removed during EGD. Pt reported he has not had pain since procedure. Pt seen for therapeutic meal monitor. Pt seated upright in bed with frequent leaning to right side due to fatigue and weakness. Min assist from daughter for feeding. Pt tolerated cheeseburger and chips with no overt s/s of difficulty or aspiration. Pt tolerated water and pop from straw with no overt s/s of difficulty or aspiration. D/C swallow goals. Treatment/Activity Tolerance:  Patient tolerated treatment well    Plan:  Discharge    Pain Assessment:  Pre-Treatment  Pain assessment: 0-10  Pain level: 0  Intervention:  Patient denies pain. Post-Treatment  Pain assessment: 0-10  Pain level: 0  Intervention:  Patient denies pain. Patient/Caregiver Education:  Patient educated on session and progression towards goals. Patient stated verbal understanding of directions. Safety Devices: All fall risk precautions in place      Therapy Time  SLP Individual Minutes  Time In: 1145  Time Out: [facility]  Minutes: 10            Signature: Electronically signed by Nieves Martino.  KAILYN Person on 11/1/2021 at 12:04 PM

## 2021-11-01 NOTE — FLOWSHEET NOTE
Patient was helped repositioned in bed after [place] care was provided from urine incontinence. new depends on and he was turned to his right side with pillows supporting his back,he expressed comfort from that intervention. 00:20 patient is sleeping,his respirations were unlabored,his call light is within his easy reach. 6:10 he slept most of the night and without respiratory distress. he was provided with complete bed linens changed for urine incontinence,patient bath was rendered by Ethan Neal.    06:19 he is resting quietly in bed,his breathing is effortless.

## 2021-11-01 NOTE — PROGRESS NOTES
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  OHDX CHF  CABGx  Remote  DM type-2  DJD knees        Plan: maintain present diuresis controll I*O  Daily weight at [facility]  PT to see    Patient Active Problem List:     CHF (congestive heart failure), NYHA class I, acute on chronic, combined ([facility]     Esophageal dysphagia      Subjective:  Admit Date: 10/27/2021    Interval History:Never wants dialysis had sister in law who did it promised never would he     Medications:  Scheduled Meds:   apixaban  2.5 mg Oral BID    amiodarone  200 mg Oral BID    atorvastatin  20 mg Oral Nightly    carvedilol  12.5 mg Oral BID    digoxin  125 mcg Oral Every Other Day    sodium chloride flush  5-40 mL IntraVENous 2 times per day    hydrALAZINE  100 mg Oral 2 times per day    sodium chloride flush  5-40 mL IntraVENous 2 times per day    insulin glargine  0.15 Units/kg SubCUTAneous Nightly    insulin lispro  0-12 Units SubCUTAneous TID WC    insulin lispro  0-6 Units SubCUTAneous Nightly     Continuous Infusions:   sodium chloride      sodium chloride      dextrose         CBC:   Recent Labs     10/31/21  0747 11/01/21  0707   WBC 6.6 5.2   HGB 8.8* 8.9*    172     CMP:    Recent Labs     10/30/21  0903 10/31/21  0747 11/01/21  0707    138 138   K 4.0 4.1 4.3    102 101   CO2 26 25 28   BUN 33* 36* 38*   CREATININE 4.26* 4.57* 5.02*   GLUCOSE 203* 154* 124*   CALCIUM 8.0* 8.1* 8.0*   LABGLOM 13.3* 12.3* 11.0*     Troponin:   Recent Labs     10/30/21  1341   TROPONINI 0.100*     BNP: No results for input(s): BNP in the last 72 hours. INR: No results for input(s): INR in the last 72 hours. Lipids: No results for input(s): CHOL, LDLDIRECT, TRIG, HDL, AMYLASE, LIPASE in the last 72 hours. Liver: No results for input(s): AST, ALT, ALKPHOS, PROT, LABALBU, BILITOT in the last 72 hours. Invalid input(s): BILDIR  Iron:  No results for input(s): IRONS, FERRITIN in the last 72 hours.     Invalid input(s): LABIRONS  Urinalysis: No results for input(s): UA in the last 72 hours.     Objective:  Vitals: BP (!) 165/54   Pulse 79   Temp 98.8 °F (37.1 °C)   Resp 18   Ht 5' 9\" (1.753 m)   Wt 216 lb (98 kg)   SpO2 92%   BMI 31.90 kg/m²    Wt Readings from Last 3 Encounters:   10/29/21 216 lb (98 kg)      24HR INTAKE/OUTPUT:      Intake/Output Summary (Last 24 hours) at 11/1/2021 0830  Last data filed at 11/1/2021 0731  Gross per 24 hour   Intake    Output 1 ml   Net -1 ml       General: alert, in no apparent distress  HEENT: normocephalic, atraumatic, anicteric  Neck: supple, no mass  Lungs: non-labored respirations, clear to auscultation bilaterally  Heart: regular rate and rhythm, no murmurs or rubs  Abdomen: soft, non-tender, non-distended  Ext: no cyanosis, no peripheral edema  Neuro: alert and oriented, no gross abnormalities  Psych: normal mood and affect  Skin: no rash      Electronically signed by Vu Fisher DO, MD

## 2021-11-02 LAB
ANION GAP SERPL CALCULATED.3IONS-SCNC: 10 MEQ/L (ref 9–15)
BUN BLDV-MCNC: 42 MG/DL (ref 8–23)
CALCIUM SERPL-MCNC: 8.1 MG/DL (ref 8.5–9.9)
CHLORIDE BLD-SCNC: 99 MEQ/L (ref 95–107)
CO2: 27 MEQ/L (ref 20–31)
CREAT SERPL-MCNC: 5.03 MG/DL (ref 0.7–1.2)
GFR AFRICAN AMERICAN: 13.3
GFR NON-AFRICAN AMERICAN: 11
GLUCOSE BLD-MCNC: 134 MG/DL (ref 60–115)
GLUCOSE BLD-MCNC: 145 MG/DL (ref 70–99)
GLUCOSE BLD-MCNC: 150 MG/DL (ref 60–115)
GLUCOSE BLD-MCNC: 150 MG/DL (ref 60–115)
GLUCOSE BLD-MCNC: 154 MG/DL (ref 60–115)
HCT VFR BLD CALC: 27 % (ref 42–52)
HEMOGLOBIN: 9.3 G/DL (ref 14–18)
MCH RBC QN AUTO: 31.9 PG (ref 27–31.3)
MCHC RBC AUTO-ENTMCNC: 34.5 % (ref 33–37)
MCV RBC AUTO: 92.4 FL (ref 80–100)
PDW BLD-RTO: 14.8 % (ref 11.5–14.5)
PERFORMED ON: ABNORMAL
PLATELET # BLD: 158 K/UL (ref 130–400)
POTASSIUM SERPL-SCNC: 4.6 MEQ/L (ref 3.4–4.9)
RBC # BLD: 2.92 M/UL (ref 4.7–6.1)
SODIUM BLD-SCNC: 136 MEQ/L (ref 135–144)
WBC # BLD: 5.7 K/UL (ref 4.8–10.8)

## 2021-11-02 PROCEDURE — 2580000003 HC RX 258: Performed by: SPECIALIST

## 2021-11-02 PROCEDURE — 80048 BASIC METABOLIC PNL TOTAL CA: CPT

## 2021-11-02 PROCEDURE — 36415 COLL VENOUS BLD VENIPUNCTURE: CPT

## 2021-11-02 PROCEDURE — 6370000000 HC RX 637 (ALT 250 FOR IP): Performed by: INTERNAL MEDICINE

## 2021-11-02 PROCEDURE — 2580000003 HC RX 258: Performed by: INTERNAL MEDICINE

## 2021-11-02 PROCEDURE — 99222 1ST HOSP IP/OBS MODERATE 55: CPT

## 2021-11-02 PROCEDURE — 97535 SELF CARE MNGMENT TRAINING: CPT

## 2021-11-02 PROCEDURE — 85027 COMPLETE CBC AUTOMATED: CPT

## 2021-11-02 PROCEDURE — 97112 NEUROMUSCULAR REEDUCATION: CPT

## 2021-11-02 PROCEDURE — 6370000000 HC RX 637 (ALT 250 FOR IP): Performed by: NURSE PRACTITIONER

## 2021-11-02 PROCEDURE — 2700000000 HC OXYGEN THERAPY PER DAY

## 2021-11-02 PROCEDURE — 99239 HOSP IP/OBS DSCHRG MGMT >30: CPT | Performed by: INTERNAL MEDICINE

## 2021-11-02 PROCEDURE — 2060000000 HC ICU INTERMEDIATE R&B

## 2021-11-02 RX ORDER — CARVEDILOL 25 MG/1
25 TABLET ORAL 2 TIMES DAILY
Status: DISCONTINUED | OUTPATIENT
Start: 2021-11-02 | End: 2021-11-03 | Stop reason: HOSPADM

## 2021-11-02 RX ADMIN — APIXABAN 2.5 MG: 2.5 TABLET, FILM COATED ORAL at 09:12

## 2021-11-02 RX ADMIN — CARVEDILOL 25 MG: 25 TABLET, FILM COATED ORAL at 20:44

## 2021-11-02 RX ADMIN — AMIODARONE HYDROCHLORIDE 200 MG: 200 TABLET ORAL at 09:12

## 2021-11-02 RX ADMIN — APIXABAN 2.5 MG: 2.5 TABLET, FILM COATED ORAL at 20:44

## 2021-11-02 RX ADMIN — HYDRALAZINE HYDROCHLORIDE 100 MG: 100 TABLET, FILM COATED ORAL at 09:12

## 2021-11-02 RX ADMIN — TORSEMIDE 50 MG: 10 TABLET ORAL at 09:36

## 2021-11-02 RX ADMIN — CARVEDILOL 25 MG: 25 TABLET, FILM COATED ORAL at 09:11

## 2021-11-02 RX ADMIN — Medication 10 ML: at 21:00

## 2021-11-02 RX ADMIN — SODIUM CHLORIDE, PRESERVATIVE FREE 10 ML: 5 INJECTION INTRAVENOUS at 20:45

## 2021-11-02 RX ADMIN — INSULIN GLARGINE 15 UNITS: 100 INJECTION, SOLUTION SUBCUTANEOUS at 20:44

## 2021-11-02 RX ADMIN — ATORVASTATIN CALCIUM 20 MG: 20 TABLET, FILM COATED ORAL at 20:44

## 2021-11-02 RX ADMIN — AMIODARONE HYDROCHLORIDE 200 MG: 200 TABLET ORAL at 20:44

## 2021-11-02 RX ADMIN — HYDRALAZINE HYDROCHLORIDE 100 MG: 100 TABLET, FILM COATED ORAL at 20:44

## 2021-11-02 ASSESSMENT — ENCOUNTER SYMPTOMS
FACIAL SWELLING: 0
CHOKING: 0
SHORTNESS OF BREATH: 1
SHORTNESS OF BREATH: 0
COUGH: 0
DIARRHEA: 0
COLOR CHANGE: 0
VOMITING: 0

## 2021-11-02 ASSESSMENT — PAIN SCALES - GENERAL: PAINLEVEL_OUTOF10: 0

## 2021-11-02 NOTE — FLOWSHEET NOTE
Pts dtr at the bedside. Dtr was updated on pts status. Pt resting in bed, voices no complaints. Pt has his head turned to the right. Pts tongue is symmetrical.  Rt hand grasp is slighty weaker than left. 200 Bigg Conrad messaged about the above, orders recd and family updated. 1400  Family remains at bedside. 1930  Pt incontinent of large amount of urine. Pt cleaned and repositioned in bed. Bed alarm on.

## 2021-11-02 NOTE — PROGRESS NOTES
Cardiology Discharge Summary      Patient Identification:  Mily Vaughn  : 1935  MRN: [id number]   Account: [de-identified]     Admit date: 10/27/2021  Discharge date: 2021   Attending provider: Duran Pope MD        Primary care provider: Thelma Stone DO     Admission Diagnoses:  <principal problem not specified>     Combined CHF  CKD  CMP  PAF        Discharge Diagnoses: Active Hospital Problems    Diagnosis Date Noted    Esophageal dysphagia [R13.19]      Priority: Low    CHF (congestive heart failure), NYHA class I, acute on chronic, combined ([facility] [address] [I50.43] 10/27/2021     Priority: [facility] Course:   Mily Vaughn is a 81 y.o. male admitted to [facility] on 10/27/2021 for . Procedures:   1. Consults:   IP CONSULT TO HOSPITALIST  IP CONSULT TO NEPHROLOGY  IP CONSULT TO OTOLARYNGOLOGY  IP CONSULT TO GI  IP CONSULT TO CARDIOLOGY  IP CONSULT TO NEUROLOGY    Examination:  BP (!) 158/59   Pulse 69   Temp 99 °F (37.2 °C) (Oral)   Resp 18   Ht 5' 9\" (1.753 m)   Wt 216 lb (98 kg)   SpO2 95%   BMI 31.90 kg/m²    Physical Exam   Constitutional: No distress. He appears chronically ill and acutely ill. HENT:   Normal cephalic and Atraumatic   Eyes: Pupils are equal, round, and reactive to light. Neck: Thyroid normal. No JVD present. No neck adenopathy. No thyromegaly present. Cardiovascular: Normal rate, regular rhythm, intact distal pulses and normal pulses. Murmur heard. Pulmonary/Chest: Effort normal and breath sounds normal. He has no wheezes. He has no rales. He exhibits no tenderness. Abdominal: Soft. Bowel sounds are normal. There is no abdominal tenderness. Musculoskeletal:         General: Edema (trace) present. No tenderness. Normal range of motion. Cervical back: Normal range of motion and neck supple. Neurological: He is alert and oriented to person, place, and time. Skin: Skin is warm. No cyanosis. Nails show no clubbing. Medications:  Current Discharge Medication List      CONTINUE these medications which have NOT CHANGED    Details   glimepiride (AMARYL) 4 MG tablet Take 4 mg by mouth every morning (before breakfast)      SITagliptin (JANUVIA) 100 MG tablet Take 100 mg by mouth daily      lisinopril-hydroCHLOROthiazide (PRINZIDE;ZESTORETIC) 10-12.5 MG per tablet Take 1 tablet by mouth daily      metFORMIN (GLUCOPHAGE) 1000 MG tablet Take 1,000 mg by mouth 2 times daily (with meals)      metoprolol succinate (TOPROL XL) 100 MG extended release tablet Take 100 mg by mouth daily      amLODIPine-atorvastatatin (CADUET) 10-20 MG per tablet Take 1 tablet by mouth daily      Multiple Vitamins-Minerals (CENTRUM SILVER ADULT 50+) TABS Take 1 tablet by mouth daily      nitroGLYCERIN (NITROSTAT) 0.4 MG SL tablet Place 0.4 mg under the tongue every 5 minutes as needed for Chest pain up to max of 3 total doses. If no relief after 1 dose, call 911. Significant Diagnostics:   Radiology: Echocardiogram complete 2D with doppler with color    Result Date: 10/28/2021  Transthoracic Echocardiography Report (TTE)  Demographics   Patient Name    Lee Mckeon  Gender               Male                  J   Patient Number  [id number]       Race                                                   Ethnicity   Visit Number    [id number]      Room Number          Z560   Corporate ID                   Date of Study        10/28/2021   Accession       [id number]     Referring Physician  Chapo Oliva MD  Number   Date of Birth   1935     Sonographer          Shade Nones   Age             81 year(s)     Interpreting         [facility])                                 Physician            Cardiology                                                      [address] [place] [address]  Procedure Type of Study   TTE procedure:ECHO COMPLETE 2D W/DOP W/COLOR.   Procedure Date Date: 10/28/2021 Start: 09:28 AM Study Location: Portable Technical Quality: Adequate visualization Indications:Congestive heart failure. Patient Status: Routine Height: 69 inches Weight: 224 pounds BSA: 2.17 m^2 BMI: 33.08 kg/m^2 BP: 169/57 mmHg  Conclusions   Summary  Normal mitral valve structure and function. Trace (1+) mitral regurgitation is present. Moderately dilated left atrium. Left ventricular ejection fraction is visually estimated at 45%. Pseudonormal filling pattern noted. Signature   ----------------------------------------------------------------  Electronically signed by Alia Alexandra(Interpreting physician)  on 10/28/2021 05:58 PM  ----------------------------------------------------------------   Findings  Left Ventricle Left ventricular ejection fraction is visually estimated at 45%. Pseudonormal filling pattern noted. Right Ventricle Normal right ventricle structure and function. Normal right ventricle systolic pressure. Left Atrium Moderately dilated left atrium. Right Atrium Normal right atrium. Mitral Valve Normal mitral valve structure and function. Trace (1+) mitral regurgitation is present. Tricuspid Valve Normal tricuspid valve structure and function. Aortic Valve Normal aortic valve structure and function. Pulmonic Valve Normal pulmonic valve structure and function. Pericardial Effusion No evidence of pericardial effusion. Pleural Effusion No evidence of pleural effusion. Aorta \ Miscellaneous Miscellaneous normal findings were found. M-Mode Measurements (cm)   LVIDd: 5.31 cm                         LVIDs: 4 cm  IVSd: 1.69 cm                          IVSs: 1.71 cm  LVPWd: 1.35 cm                         LVPWs: 1.53 cm  Rt. Vent.  Dimension: 2.46 cm           AO Root Dimension: 3.21 cm                                         ACS: 1.9 cm                                         LVOT: 2.11 cm  Doppler Measurements:   AV Velocity:0.03 m/s                   MV Peak E-Wave: 0.85 m/s  AV Peak Gradient: 5.2 mmHg             MV Peak A-Wave: 0.64 m/s  AV Mean Gradient: 2.69 mmHg  AV Area (Continuity):3.06 cm^2  Valves  Mitral Valve   Peak E-Wave: 0.85 m/s                 Peak A-Wave: 0.64 m/s                                        E/A Ratio: 1.34                                        Peak Gradient: 2.91 mmHg                                        Deceleration Time: 151.1 msec   Tissue Doppler   E' Septal Velocity: 0.05 m/s  E' Lateral Velocity: 0.07 m/s   Aortic Valve   Peak Velocity: 1.14 m/s                Mean Velocity: 0.77 m/s  Peak Gradient: 5.2 mmHg                Mean Gradient: 2.69 mmHg  Area (continuity): 3.06 cm^2  AV VTI: 30.04 cm   Cusp Separation: 1.9 cm   LVOT   Peak Velocity: 1.01 m/s               Mean Velocity: 0.62 m/s  Peak Gradient: 3.44 mmHg              Mean Gradient: 1.8 mmHg  LVOT Diameter: 2.11 cm                LVOT VTI: 26.26 cm  Structures  Left Atrium   LA Volume/Index: 76.18 ml /35 m^2             LA Area: 22.46 cm^2   Left Ventricle   Diastolic Dimension: 4.88 cm          Systolic Dimension: 4 cm  Septum Diastolic: 0.94 cm             Septum Systolic: 0.07 cm  PW Diastolic: 9.91 cm                 PW Systolic: 3.18 cm                                        FS: 24.7 %  LV EDV/LV EDV Index: 135.73 ml/63 m^2 LV ESV/LV ESV Index: 69.83 ml/32 m^2  EF Calculated: 48.6 %                 LV Length: 7.35 cm   LVOT Diameter: 2.11 cm   Right Ventricle   Diastolic Dimension: 2.46 cm  Aorta/ Miscellaneous Aorta   Aortic Root: 3.21 cm  LVOT Diameter: 2.11 cm      CT SOFT TISSUE NECK WO CONTRAST    Result Date: 10/29/2021  EXAMINATION: CT SOFT TISSUE NECK WO CONTRAST DATE AND TIME:10/28/2021 8:09 PM CLINICAL HISTORY: Acute neck pain. foreign body sensation COMPARISON: None TECHNIQUE: Sequential axial CT images were obtained from the skull base to the thoracic inlet.  No IV contrast.  All CT scans at this facility use dose modulation, iterative reconstruction, and/or weight based dosing when appropriate to reduce radiation dose to as low as reasonably achievable. There is no radiopaque foreign body. There are exuberant anterior osteophytes from C2 through C6 that encroach on the retropharyngeal soft tissues most striking in the hypopharynx. Occasionally these prominent osteophytes can be a source of dysphagia. Roslynn Mutton Pharyngeal mucosal space, deep spaces, and infrahyoid neck otherwise unremarkable. No cervical adenopathy. Scans through the upper neck which included portion of the lower head demonstrate age-related parenchymal volume loss changes. NO RADIOPAQUE FOREIGN BODY. PROMINENT ANTERIOR CERVICAL SPURRING. CT CHEST WO CONTRAST    Result Date: 10/27/2021  HISTORY: Mike Vidal is a Male of 81 years age. DIAGNOSIS:  sob COMPARISON: None available. TECHNIQUE: Thin spiral axial CT was performed from the thoracic inlet to the lung bases, without intravenous contrast administration. 2-D reformatted axial, sagittal and coronal images were obtained. 3-D MIPS images were also performed. FINDINGS: Moderate size pleural effusions are seen bilaterally. The heart is mildly enlarged. Increased interstitial markings are seen bilaterally. Atelectasis or consolidation is seen in both bases. No aneurysm is seen. The main pulmonary artery measures 31 mm. Atherosclerotic calcifications are seen in the great vessels and superior mediastinum, aortic arch and coronary stents and/or calcifications are seen. There is evidence of median sternotomy. No pericardial effusion is seen. Degenerative changes are seen in the spine. Limited survey views of the upper abdomen show calcifications in the spleen consistent with old granulomatous disease. The gallbladder is surgically absent. There are extensive atherosclerotic calcifications seen in the visualized portion of the abdomen. 1. CHF 2. Bilateral pleural effusions with atelectasis and/or infiltrate 3. Enlarged main pulmonary artery.  Reformatted pulmonary arterial hypertension All CT scans at this facility use dose modulation, iterative reconstruction, and/or weight based dosing when appropriate to reduce radiation dose to as low as reasonably achievable. FL MODIFIED BARIUM SWALLOW W VIDEO    Result Date: 10/29/2021  EXAMINATION: FL MODIFIED BARIUM SWALLOW W VIDEO DATE AND TIME:10/28/2021 2:50 PM CLINICAL HISTORY: Dysphagia. Possible aspiration. dysphagia; odynophagia  COMPARISON: None available. Technique: A modified barium swallow study was performed in the Radiology Suite in conjunction with Speech Therapy. The patient was seated upright throughout this assessment. Multiple consistencies were used to evaluate swallowing function and safety. Videofluoroscopic imaging was utilized (this is considered equivalent to one image for purposes of compliance with MIPS). Fluoroscopy time was 3.3 minutes Findings:     Oral phase: There was prolonged mastication and bolus formation with some premature spillage of barium to the level of the valleculae and  pyriform sinuses     Pharyngeal Phase: There was some pharyngeal dysfunction without evidence of penetration or aspiration. Esophageal phase: The patient's upper esophageal sphincter opens normally. There is no diverticulum stricture or fistula. Residual:There was some vallecular and pyriform sinus residue which adequately cleared. Moderate oral and pharyngeal dysfunction without aspiration. Please refer to speech pathology  report for recommendations.        Labs:   Recent Results (from the past 72 hour(s))   Basic Metabolic Panel    Collection Time: 10/30/21  9:03 AM   Result Value Ref Range    Sodium 136 135 - 144 mEq/L    Potassium 4.0 3.4 - 4.9 mEq/L    Chloride 101 95 - 107 mEq/L    CO2 26 20 - 31 mEq/L    Anion Gap 9 9 - 15 mEq/L    Glucose 203 (H) 70 - 99 mg/dL    BUN 33 (H) 8 - 23 mg/dL    CREATININE 4.26 (H) 0.70 - 1.20 mg/dL    GFR Non-African American 13.3 (L) >60    GFR  16.1 (L) >60    Calcium 8.0 (L) 8.5 - 9.9 mg/dL   CBC    Collection Time: 10/30/21  9:03 AM   Result Value Ref Range    WBC 6.3 4.8 - 10.8 K/uL    RBC 3.00 (L) 4.70 - 6.10 M/uL    Hemoglobin 9.5 (L) 14.0 - 18.0 g/dL    Hematocrit 28.3 (L) 42.0 - 52.0 %    MCV 94.6 80.0 - 100.0 fL    MCH 31.8 (H) 27.0 - 31.3 pg    MCHC 33.7 33.0 - 37.0 %    RDW 15.2 (H) 11.5 - 14.5 %    Platelets 978 301 - 437 K/uL   POCT Glucose    Collection Time: 10/30/21 11:16 AM   Result Value Ref Range    POC Glucose 159 (H) 60 - 115 mg/dl    Performed on ACCU-CHEK    POCT Glucose    Collection Time: 10/30/21  1:00 PM   Result Value Ref Range    POC Glucose 188 (H) 60 - 115 mg/dl    Performed on ACCU-CHEK    Troponin    Collection Time: 10/30/21  1:41 PM   Result Value Ref Range    Troponin 0.100 (HH) 0.000 - 0.010 ng/mL   POCT Glucose    Collection Time: 10/30/21  4:17 PM   Result Value Ref Range    POC Glucose 226 (H) 60 - 115 mg/dl    Performed on ACCU-CHEK    POCT Glucose    Collection Time: 10/30/21  8:12 PM   Result Value Ref Range    POC Glucose 168 (H) 60 - 115 mg/dl    Performed on ACCU-CHEK    POCT Glucose    Collection Time: 10/31/21  5:51 AM   Result Value Ref Range    POC Glucose 125 (H) 60 - 115 mg/dl    Performed on ACCU-CHEK    Basic Metabolic Panel    Collection Time: 10/31/21  7:47 AM   Result Value Ref Range    Sodium 138 135 - 144 mEq/L    Potassium 4.1 3.4 - 4.9 mEq/L    Chloride 102 95 - 107 mEq/L    CO2 25 20 - 31 mEq/L    Anion Gap 11 9 - 15 mEq/L    Glucose 154 (H) 70 - 99 mg/dL    BUN 36 (H) 8 - 23 mg/dL    CREATININE 4.57 (H) 0.70 - 1.20 mg/dL    GFR Non-African American 12.3 (L) >60    GFR  14.9 (L) >60    Calcium 8.1 (L) 8.5 - 9.9 mg/dL   CBC    Collection Time: 10/31/21  7:47 AM   Result Value Ref Range    WBC 6.6 4.8 - 10.8 K/uL    RBC 2.72 (L) 4.70 - 6.10 M/uL    Hemoglobin 8.8 (L) 14.0 - 18.0 g/dL    Hematocrit 25.7 (L) 42.0 - 52.0 %    MCV 94.4 80.0 - 100.0 fL    MCH 32.3 (H) 27.0 - 31.3 pg    MCHC 34.2 33.0 - 37.0 % RDW 14.9 (H) 11.5 - 14.5 %    Platelets 828 452 - 977 K/uL   POCT Glucose    Collection Time: 10/31/21 11:02 AM   Result Value Ref Range    POC Glucose 206 (H) 60 - 115 mg/dl    Performed on ACCU-CHEK    POCT Glucose    Collection Time: 10/31/21  4:14 PM   Result Value Ref Range    POC Glucose 191 (H) 60 - 115 mg/dl    Performed on ACCU-CHEK    POCT Glucose    Collection Time: 10/31/21  9:10 PM   Result Value Ref Range    POC Glucose 198 (H) 60 - 115 mg/dl    Performed on ACCU-CHEK    Basic Metabolic Panel    Collection Time: 11/01/21  7:07 AM   Result Value Ref Range    Sodium 138 135 - 144 mEq/L    Potassium 4.3 3.4 - 4.9 mEq/L    Chloride 101 95 - 107 mEq/L    CO2 28 20 - 31 mEq/L    Anion Gap 9 9 - 15 mEq/L    Glucose 124 (H) 70 - 99 mg/dL    BUN 38 (H) 8 - 23 mg/dL    CREATININE 5.02 (H) 0.70 - 1.20 mg/dL    GFR Non-African American 11.0 (L) >60    GFR  13.3 (L) >60    Calcium 8.0 (L) 8.5 - 9.9 mg/dL   CBC    Collection Time: 11/01/21  7:07 AM   Result Value Ref Range    WBC 5.2 4.8 - 10.8 K/uL    RBC 2.76 (L) 4.70 - 6.10 M/uL    Hemoglobin 8.9 (L) 14.0 - 18.0 g/dL    Hematocrit 25.6 (L) 42.0 - 52.0 %    MCV 92.5 80.0 - 100.0 fL    MCH 32.3 (H) 27.0 - 31.3 pg    MCHC 34.9 33.0 - 37.0 %    RDW 14.7 (H) 11.5 - 14.5 %    Platelets 936 529 - 987 K/uL   POCT Glucose    Collection Time: 11/01/21  7:08 AM   Result Value Ref Range    POC Glucose 126 (H) 60 - 115 mg/dl    Performed on ACCU-CHEK    POCT Glucose    Collection Time: 11/01/21  4:12 PM   Result Value Ref Range    POC Glucose 228 (H) 60 - 115 mg/dl    Performed on ACCU-CHEK    POCT Glucose    Collection Time: 11/01/21  8:23 PM   Result Value Ref Range    POC Glucose 169 (H) 60 - 115 mg/dl    Performed on ACCU-CHEK    POCT Glucose    Collection Time: 11/02/21  5:29 AM   Result Value Ref Range    POC Glucose 150 (H) 60 - 115 mg/dl    Performed on ACCU-CHEK    Basic Metabolic Panel    Collection Time: 11/02/21  6:30 AM   Result Value Ref Range Sodium 136 135 - 144 mEq/L    Potassium 4.6 3.4 - 4.9 mEq/L    Chloride 99 95 - 107 mEq/L    CO2 27 20 - 31 mEq/L    Anion Gap 10 9 - 15 mEq/L    Glucose 145 (H) 70 - 99 mg/dL    BUN 42 (H) 8 - 23 mg/dL    CREATININE 5.03 (H) 0.70 - 1.20 mg/dL    GFR Non-African American 11.0 (L) >60    GFR  13.3 (L) >60    Calcium 8.1 (L) 8.5 - 9.9 mg/dL   CBC    Collection Time: 11/02/21  6:30 AM   Result Value Ref Range    WBC 5.7 4.8 - 10.8 K/uL    RBC 2.92 (L) 4.70 - 6.10 M/uL    Hemoglobin 9.3 (L) 14.0 - 18.0 g/dL    Hematocrit 27.0 (L) 42.0 - 52.0 %    MCV 92.4 80.0 - 100.0 fL    MCH 31.9 (H) 27.0 - 31.3 pg    MCHC 34.5 33.0 - 37.0 %    RDW 14.8 (H) 11.5 - 14.5 %    Platelets 468 489 - 881 K/uL           Follow-up visits:   BONNIE Mensah  [address] [facility] [id number] [place] [address] Problems    Diagnosis Date Noted    Esophageal dysphagia [R13.19]      Priority: Low    CHF (congestive heart failure), NYHA class I, [facility] [I50.43] 10/27/2021     Priority: Low     1. Acute on Chronic Combined Decompensted CHF - discussed with Dr. Angeles Seen- will start Demadex 50 qd. Will need to have labs at [address] [facility]. 2. CKD- pt absolutely refuses HD. 3. PAF- was started on Amiodarone and DIg. + Low dose Eliquis. Converted to SR.  stopped Dig. Advance Coreg. 4. LVEF 45%  5. CABG-x5 - ASA BB Statin. 6. DM- on Insulin  7. Odynophagia- ENT evaluation negative. Gastric Polyp found and removed. There were no Esophageal pathology. Symptoms resolved. 8. HPL - high dose Statin  9. PTOT  10. Dc>30  11.  Precert for SNF                Electronically signed by Abigail Saenz MD on 11/2/2021 at 8:23 AM

## 2021-11-02 NOTE — PROGRESS NOTES
Nephrology Progress Note    Assessment:  CKD-5  OHDX  CHF   DM type-2  S/PLAN: CABGx   Plan:  Discussed dialysis again absolutely clear does no want it  ever    Patient Active Problem List:     CHF (congestive heart failure), NYHA class I, acute on chronic, combined ([facility] [address]     Esophageal dysphagia      Subjective:  Admit Date: 10/27/2021    Interval History: breathing fine    Medications:  Scheduled Meds:   apixaban  2.5 mg Oral BID    amiodarone  200 mg Oral BID    atorvastatin  20 mg Oral Nightly    carvedilol  12.5 mg Oral BID    sodium chloride flush  5-40 mL IntraVENous 2 times per day    hydrALAZINE  100 mg Oral 2 times per day    sodium chloride flush  5-40 mL IntraVENous 2 times per day    insulin glargine  0.15 Units/kg SubCUTAneous Nightly    insulin lispro  0-12 Units SubCUTAneous TID WC    insulin lispro  0-6 Units SubCUTAneous Nightly     Continuous Infusions:   sodium chloride      sodium chloride      dextrose         CBC:   Recent Labs     11/01/21  0707 11/02/21  0630   WBC 5.2 5.7   HGB 8.9* 9.3*    158     CMP:    Recent Labs     10/31/21  0747 11/01/21  0707 11/02/21  0630    138 136   K 4.1 4.3 4.6    101 99   CO2 25 28 27   BUN 36* 38* 42*   CREATININE 4.57* 5.02* 5.03*   GLUCOSE 154* 124* 145*   CALCIUM 8.1* 8.0* 8.1*   LABGLOM 12.3* 11.0* 11.0*     Troponin:   Recent Labs     10/30/21  1341   TROPONINI 0.100*     BNP: No results for input(s): BNP in the last 72 hours. INR: No results for input(s): INR in the last 72 hours. Lipids: No results for input(s): CHOL, LDLDIRECT, TRIG, HDL, AMYLASE, LIPASE in the last 72 hours. Liver: No results for input(s): AST, ALT, ALKPHOS, PROT, LABALBU, BILITOT in the last 72 hours. Invalid input(s): BILDIR  Iron:  No results for input(s): IRONS, FERRITIN in the last 72 hours. Invalid input(s): LABIRONS  Urinalysis: No results for input(s): UA in the last 72 hours.     Objective:  Vitals: BP (!) 158/59   Pulse 69   Temp 99 °F (37.2 °C) (Oral)   Resp 18   Ht 5' 9\" (1.753 m)   Wt 216 lb (98 kg)   SpO2 95%   BMI 31.90 kg/m²    Wt Readings from Last 3 Encounters:   10/29/21 216 lb (98 kg)      24HR INTAKE/OUTPUT:      Intake/Output Summary (Last 24 hours) at 11/2/2021 0803  Last data filed at 11/2/2021 0606  Gross per 24 hour   Intake 250 ml   Output 350 ml   Net -100 ml       General: alert, in no apparent distress  HEENT: normocephalic, atraumatic, anicteric  Neck: supple, no mass  Lungs: non-labored respirations, clear to auscultation bilaterally  Heart: regular rate and rhythm, no murmurs or rubs  Abdomen: soft, non-tender, non-distended  Ext: no cyanosis, no peripheral edema  Neuro: alert and oriented, no gross abnormalities  Psych: normal mood and affect  Skin: no rash      Electronically signed by Nita Cruz DO, MD

## 2021-11-02 NOTE — PROGRESS NOTES
Physical Therapy Med Surg Daily Treatment Note  Facility/Department: [facility]  [facility]       NAME: Paige Riggs  : 1935 (81 y.o.)  MRN: [id number]  CODE STATUS: Full Code    Date of Service: 2021    Patient Diagnosis(es): CHF (congestive heart failure), NYHA class I, acute on chronic, combined ([address] [I50.43]  Congestive heart failure, unspecified HF chronicity, unspecified heart failure type ([address] [I50.9]   Chief Complaint   Patient presents with    Shortness of Breath     Patient Active Problem List    Diagnosis Date Noted    Esophageal dysphagia     CHF (congestive heart failure), NYHA class I, acute on chronic, combined ([address] 10/27/2021        Past Medical History:   Diagnosis Date    CAD (coronary artery disease)     Hyperlipidemia      Past Surgical History:   Procedure Laterality Date    CARDIAC SURGERY      UPPER GASTROINTESTINAL ENDOSCOPY N/A 10/29/2021    EGD BIOPSY performed by Bernice Mosqueda MD at [facility]       Restrictions  Restrictions/Precautions: Fall Risk (full code)    SUBJECTIVE   General  Chart Reviewed: Yes  Family / Caregiver Present: No  Subjective  Subjective: \"I spilt my coffee this morning. \"    Pre-Session Pain Report  Pre Treatment Pain Screening  Pain at present: 0  Intervention List: Patient able to continue with treatment  Pain Screening  Patient Currently in Pain: Denies       Post-Session Pain Report  Pain Assessment  Pain Level: 0         OBJECTIVE        Bed mobility  Supine to Sit: Minimal assistance (assist with trunk)  Sit to Supine: Unable to assess (pt up in chair post tx)  Scooting: Contact guard assistance  Comment: HOB elevated during supine > sit. Increased effort required. Transfers  Sit to Stand: Minimal Assistance  Stand to sit: Contact guard assistance;Minimal Assistance  Comment: Max vc's for hand placement throughout tx. Min A for stability upon standing. Posterior LOB with backing up to chair requiring min A. Multiple STS to improve carryover of proper technique. Ambulation  Ambulation?: Yes  More Ambulation?: No  Ambulation 1  Surface: level tile  Device: Rolling Walker  Assistance: Contact guard assistance;Minimal assistance  Quality of Gait: shortened step length, decreased mei heel strike, mild lateral sway  Gait Deviations: Slow Venus;Decreased step height;Decreased step length  Distance: 8' x 2  Comments: mild LOB with turning, min A for correction. Neuromuscular Education  PNF: Static standing at [employer], lateral weightshifting. Neuromuscular Comments: Static standing at [employer] with alternating movements of BUE. With BUE off walker, pt maintains an improved posture and midline postioning, with LUE on [employer], pt exhibits increased R lean with pusher syndrome like symptoms. Pt demonstrates this postioning as well sitting EOB and performing fwd reaching activity. ASSESSMENT   Assessment: Pt displays an improvement in strength this session secondary to decreased assist with supine > sit transition. Pt also able to perform standing and gait training with no c/o dizziness. R lean continues to be demonstrated during balance activities in standing and sitting. SpO2 remaining WNL throughout tx including post ambulation. Discharge Recommendations:  Continue to assess pending progress    Goals  Short term goals  Short term goal 1: Patient will perform bed mobility with HOB flat with modified independence. Short term goal 2: Patient will perform sit<>stand & stand pivot transfers with LRD with modified independence. Short term goal 3: Patient will ambulate 48' with LRD with modified independence. Short term goal 4: Patient will tolerate 5 minutes of standing functional mobility tasks consistently. Patient Goals   Patient goals : \"To figure out what is going on with my throat\"    PLAN    Times per week: 3-6  Plan Comment: Cont. POC  Safety Devices  Type of devices:  All fall risk precautions in place, Call light within reach, Chair alarm in place, Left in chair, Nurse notified     [facility] (6 CLICK) [address] Mobility Raw Score : 18     Therapy Time   Individual   Time In 0847   Time Out 0910   Minutes 23      BM/Trsf: 10  Gait: 5  NMR: 8       William Wilkinson PTA, 11/02/21 at 9:24 AM         Definitions for assistance levels  Independent = pt does not require any physical supervision or assistance from another person for activity completion. Device may be needed.   Stand by assistance = pt requires verbal cues or instructions from another person, close to but not touching, to perform the activity  Minimal assistance= pt performs 75% or more of the activity; assistance is required to complete the activity  Moderate assistance= pt performs 50% of the activity; assistance is required to complete the activity  Maximal assistance = pt performs 25% of the activity; assistance is required to complete the activity  Dependent = pt requires total physical assistance to accomplish the task

## 2021-11-02 NOTE — FLOWSHEET NOTE
Dr Nas Rodriguez here making rounds and spoke with the pt re: his wishes about dialysis. Pt was adamant about not wanting dialysis. Pt is more alert today. 0900  PT in to see the pt. Pt up in the chair. 1520  Family at bedside. Pt asking to go to the br. Pt did ambulate to the br with help of nurse and walker.

## 2021-11-02 NOTE — CARE COORDINATION
Per Donnell Simmons Edevelia Daily has the precert for the patient. The LSW updated the  and the patient's RN. [id number] sent.  Electronically signed by REFUGIO Rosales on 11/2/21 at 10:16 AM EDT

## 2021-11-02 NOTE — PROGRESS NOTES
Progress Note  Date:2021       Room:W189/W189-01  Patient Jeremías Scherer     YOB: 1935     Age:86 y.o. Subjective    Subjective:  Symptoms:  He reports weakness. No shortness of breath, malaise, cough, chest pain, headache, chest pressure, anorexia, diarrhea or anxiety. Review of Systems   Respiratory: Negative for cough and shortness of breath. Cardiovascular: Negative for chest pain. Gastrointestinal: Negative for anorexia and diarrhea. Neurological: Positive for weakness. Objective         Vitals Last 24 Hours:  TEMPERATURE:  Temp  Av.7 °F (37.1 °C)  Min: 98.4 °F (36.9 °C)  Max: 99 °F (37.2 °C)  RESPIRATIONS RANGE: Resp  Av  Min: 18  Max: 18  PULSE OXIMETRY RANGE: SpO2  Av %  Min: 93 %  Max: 97 %  PULSE RANGE: Pulse  Av  Min: 66  Max: 77  BLOOD PRESSURE RANGE: Systolic (49MVK), UCH:951 , Min:140 , DQX:528   ; Diastolic (86DAO), NOEL:52, Min:47, Max:59    I/O (24Hr): Intake/Output Summary (Last 24 hours) at 2021 1215  Last data filed at 2021 0606  Gross per 24 hour   Intake 250 ml   Output 350 ml   Net -100 ml     Objective:  General Appearance:  Comfortable, well-appearing and in no acute distress. Vital signs: (most recent): Blood pressure (!) 164/56, pulse 77, temperature 98.8 °F (37.1 °C), resp. rate 18, height 5' 9\" (1.753 m), weight 216 lb (98 kg), SpO2 95 %. HEENT: Normal HEENT exam.    Lungs:  Normal effort. Heart: Normal rate. Regular rhythm. Abdomen: Abdomen is soft. Bowel sounds are normal.   There is no epigastric area or suprapubic area tenderness. Pulses: Distal pulses are intact. Neurological: Patient is alert. Pupils:  Pupils are equal, round, and reactive to light. Skin:  Warm.       Labs/Imaging/Diagnostics    Labs:  CBC:  Recent Labs     10/31/21  0747 21  0707 21  0630   WBC 6.6 5.2 5.7   RBC 2.72* 2.76* 2.92*   HGB 8.8* 8.9* 9.3*   HCT 25.7* 25.6* 27.0*   MCV 94.4 92.5 92.4 RDW 14.9* 14.7* 14.8*    172 158     CHEMISTRIES:  Recent Labs     10/31/21  0747 11/01/21  0707 11/02/21  0630    138 136   K 4.1 4.3 4.6    101 99   CO2 25 28 27   BUN 36* 38* 42*   CREATININE 4.57* 5.02* 5.03*   GLUCOSE 154* 124* 145*     PT/INR:No results for input(s): PROTIME, INR in the last 72 hours. APTT:No results for input(s): APTT in the last 72 hours. LIVER PROFILE:No results for input(s): AST, ALT, BILIDIR, BILITOT, ALKPHOS in the last 72 hours. Imaging Last 24 Hours:  No results found. Assessment//Plan           Hospital Problems         Last Modified POA    CHF (congestive heart failure), NYHA class I, acute on chronic, combined ([facility] 10/27/2021 Yes    Esophageal dysphagia 10/29/2021 Yes      Acute on chroicn CHF  SHANDRA on CK stage IV  afib with RVR  DM2    Assessment & Plan    11/1: Lower extremity swelling is less compared to before, continue with current management. Denies any needs. Feeling better. No overnight events. Continue oxygen therapy to keep oxygen above 92%, diabetes is stable. 11/2: Patient was seen and evaluated. No overnight events. No new complaints. Patient can be discharged anytime. Spoke to nursing.   Electronically signed by Lillian Rene MD on 11/1/21 at 12:38 PM EDT

## 2021-11-02 NOTE — CONSULTS
[facility] Neurology Consult Note  Name: Kenna Mason  Age: 81 y.o. Gender: male  Chief Complaint:Shortness of Breath    Primary Care Provider: Justin Sin DO  Admission Date: 10/27/2021        This is an 81-year-old right-hand-dominant gentleman on whom neurology was consulted regarding possible right-sided weakness. He was admitted on October 27 with shortness of breath and lower extremity edema. he is being managed for CHF. Elevated risk factors include hyperlipidemia hypertension and type 2 diabetes. He has acute on chronic stage IV renal disease as well as atrial fibrillation with RVR    Recent episode on Saturday in which he was sitting on his bed and became acutely quite pale and according to his daughter not responsive for 2 to 3 minutes. No abnormal movements suggestive of seizure. No noted urinary incontinence. No nystagmus noted. Since then the care team and the family have noticed that he seems to be veering towards the right side when being mobilized.     Patient Active Problem List   Diagnosis    CHF (congestive heart failure), NYHA class I, acute on chronic, combined ([facility] [address]    Esophageal dysphagia       Past Medical History:   Diagnosis Date    CAD (coronary artery disease)     Hyperlipidemia      htn  Dm ii  Medications:  Reviewed    Infusion Medications:    sodium chloride      sodium chloride      dextrose       Scheduled Medications:    carvedilol  25 mg Oral BID    torsemide  50 mg Oral Daily    apixaban  2.5 mg Oral BID    amiodarone  200 mg Oral BID    atorvastatin  20 mg Oral Nightly    sodium chloride flush  5-40 mL IntraVENous 2 times per day    hydrALAZINE  100 mg Oral 2 times per day    sodium chloride flush  5-40 mL IntraVENous 2 times per day    insulin glargine  0.15 Units/kg SubCUTAneous Nightly    insulin lispro  0-12 Units SubCUTAneous TID WC    insulin lispro  0-6 Units SubCUTAneous Nightly     PRN Meds: metoprolol, sodium chloride flush, sodium chloride, benzocaine-menthol, phenylephrine, sodium chloride flush, sodium chloride, acetaminophen, ondansetron **OR** ondansetron, glucose, dextrose, glucagon (rDNA), dextrose    No Known Allergies        History reviewed. No pertinent family history. Past Surgical History:   Procedure Laterality Date    CARDIAC SURGERY      UPPER GASTROINTESTINAL ENDOSCOPY N/A 10/29/2021    EGD BIOPSY performed by Yessica Sosa MD at [address] History     Tobacco History     Smoking Status  Never Smoker    Smokeless Tobacco Use  Never Used          Alcohol History     Alcohol Use Status  Not Currently          Drug Use     Drug Use Status  Not Currently          Sexual Activity     Sexually Active  Not Currently                  Vitals:    11/02/21 1430   BP: (!) 149/50   Pulse: 60   Resp:    Temp: 98.5 °F (36.9 °C)   SpO2: 99%         Neurologic Exam        Cognitive and Language: Alert and answered questions appropriately. Language was fluent including repetition and naming. Followed simple and complex commands. Mild dysarthria throughout with some impairment of rhythm. Cranial Nerves: Visual fields were full tested binocularly to finger counting in all 4 quadrants with no visual extinction. Pupils were equal and both reactive to light. Extraocular movements were full with no diplopia or nystagmus. Facial sensation was normal to light touch in V1 to V3. Facial strength was symmetric. Normal hearing grossly bilaterally. Palatal raise was symmetric. Shoulder shrug was symmetric. Tongue protrusion was symmetric with no fasciculations.     Motor:      Right Left     Shoulder Abduction:   5 5   Elbow Extension  4 5   Elbow Flexion 4 5   Wrist Extension 4 5   Finger Extension 4 5          Right Left   Hip Flexion 2 5   Knee Extension 4 5   Knee Flexion 4 5   Dorsiflexion 4 5   Plantar Flexion 5- 5     Tremor: absent     Tone: Slightly increased in the right upper extremity, early spasticity,    Reflexes: Brisk throughout the upper extremity at biceps and brachioradialis [3+]. In the patella they were 2 and symmetric. Ankle jerks were absent. Sensory: normal to light touch but possible extinction to simultaneous light touch stimulation in the left lower leg      Coordination: Impaired finger-to-nose testing on the right but this was also limited by vision. Romberg and gait were deferred due to patient safety. .        Review of Systems   Constitutional: Negative for diaphoresis and fever. HENT: Negative for facial swelling. Eyes: Negative for visual disturbance. Respiratory: Positive for shortness of breath. Negative for choking. Cardiovascular: Positive for leg swelling. Gastrointestinal: Negative for vomiting. Endocrine: Negative. Genitourinary: Negative. Musculoskeletal: Negative for joint swelling and myalgias. Skin: Negative for color change. Neurological: Positive for dizziness, speech difficulty and headaches. Hematological: Negative. Psychiatric/Behavioral: Negative for agitation, confusion and decreased concentration. Labs:   Recent Labs     10/31/21  0747 11/01/21  0707 11/02/21  0630   WBC 6.6 5.2 5.7   HGB 8.8* 8.9* 9.3*   HCT 25.7* 25.6* 27.0*    172 158     Recent Labs     10/31/21  0747 11/01/21  0707 11/02/21  0630    138 136   K 4.1 4.3 4.6    101 99   CO2 25 28 27   BUN 36* 38* 42*   CREATININE 4.57* 5.02* 5.03*   CALCIUM 8.1* 8.0* 8.1*     No results for input(s): AST, ALT, BILIDIR, BILITOT, ALKPHOS in the last 72 hours. No results for input(s): INR in the last 72 hours. No results for input(s): Kennyth Hammers in the last 72 hours.     Urinalysis:   Lab Results   Component Value Date    NITRU NEGATIVE 03/25/2021    WBCUA 2 03/25/2021    BACTERIA 1+ 03/25/2021    RBCUA 1 03/25/2021    BLOODU NEGATIVE 03/25/2021       Radiology:    CT SOFT TISSUE NECK WO CONTRAST    Result Date: 10/29/2021  NO RADIOPAQUE FOREIGN BODY. PROMINENT ANTERIOR CERVICAL SPURRING. CT CHEST WO CONTRAST    Result Date: 10/27/2021  1. CHF 2. Bilateral pleural effusions with atelectasis and/or infiltrate 3. Enlarged main pulmonary artery. Reformatted pulmonary arterial hypertension All CT scans at this facility use dose modulation, iterative reconstruction, and/or weight based dosing when appropriate to reduce radiation dose to as low as reasonably achievable. FL MODIFIED BARIUM SWALLOW W VIDEO    Result Date: 10/29/2021  Moderate oral and pharyngeal dysfunction without aspiration. Please refer to speech pathology  report for recommendations. Most recent    EEG No valid procedures specified. MRI of Brain No results found for this or any previous visit. No results found for this or any previous visit. MRA of the Head and Neck: No results found for this or any previous visit. No results found for this or any previous visit. No results found for this or any previous visit. CT of the Head: No results found for this or any previous visit. No results found for this or any previous visit. No results found for this or any previous visit. Carotid duplex: No results found for this or any previous visit. No results found for this or any previous visit. Results for orders placed during the hospital encounter of 04/09/21    US CAROTID ARTERY BILATERAL    Narrative  US CAROTID ARTERY BILATERAL    CLINICAL HISTORY: G45.9 TIA (transient ischemic attack) ICD10    COMPARISON: None    FINDINGS:    Technique:  A carotid ultrasound study was performed using high resolution gray scale imaging and doppler color and  spectral analysis  of the extra cranial vessels of the neck was performed    Findings    There is mild to moderate atherosclerotic changes seen in the left carotid bulb. . No spectral broadening.     On doppler images  the peak systolic velocity measurements area as follows:  Right Common carotid 70 cm/s, Right internal carotid artery  Proximal 63cm/s , mid 111cm/s , distal 56 cm/s, ECA 1.690 cm/s. Right ICA/CCA ratio    Left Common carotid 76 cm/s, Left internal carotid artery  Proximal 111cm/s , mid 77cm/s , distal 83 cm/s,  cm/s. Left internal carotid artery ratio: 1.5    There are bilaterally patent vertebral arteries with antegrade blood flow. CONCLUSION:    NO HEMODYNAMICALLY SIGNIFICANT VASCULAR STENOSIS IN THE CAROTID AND VERTEBRAL ARTERIES IN THE NECK  THERE IS ELEVATED VELOCITY SEEN IN THE LEFT EXTERNAL CAROTID ARTERY. THIS CAN SOMETIMES BE ASSOCIATED WITH A CAROTID BRUIT. [address] [place] [address] of Radiologists in ultrasound ([facility])  (Radiology 0609;117:204-134. DOI 10.1148/radiol. [id number]) was used to estimate internal carotid artery stenosis. Echo No results found for this or any previous visit. Assessment/Plan:    Chart, labs,and images reviewed. This is an 32-year-old right-hand-dominant gentleman with multiple stroke risk factors who likely has sustained a left subcortical infarct with right-sided weakness and some dysarthria/ataxia. I will order lipid panel and hemoglobin A1c. He is already on atorvastatin at 20 mg  He is already known for atrial fibrillation and is on apixaban 2.5 twice daily. No evidence of stenosis on his carotid ultrasounds. Echo shows no cardiac source of emboli.           Electronically signed by Kennedy Olmstead MD on 11/2/2021 at 3:51 PM

## 2021-11-03 ENCOUNTER — APPOINTMENT (OUTPATIENT)
Dept: MRI IMAGING | Age: 86
DRG: 291 | End: 2021-11-03
Payer: MEDICARE

## 2021-11-03 VITALS
SYSTOLIC BLOOD PRESSURE: 138 MMHG | WEIGHT: 216 LBS | HEART RATE: 52 BPM | OXYGEN SATURATION: 100 % | TEMPERATURE: 97.7 F | HEIGHT: 69 IN | RESPIRATION RATE: 18 BRPM | BODY MASS INDEX: 31.99 KG/M2 | DIASTOLIC BLOOD PRESSURE: 47 MMHG

## 2021-11-03 LAB
ANION GAP SERPL CALCULATED.3IONS-SCNC: 10 MEQ/L (ref 9–15)
BUN BLDV-MCNC: 48 MG/DL (ref 8–23)
CALCIUM SERPL-MCNC: 8.2 MG/DL (ref 8.5–9.9)
CHLORIDE BLD-SCNC: 100 MEQ/L (ref 95–107)
CHOLESTEROL, TOTAL: 123 MG/DL (ref 0–199)
CO2: 27 MEQ/L (ref 20–31)
CREAT SERPL-MCNC: 5.12 MG/DL (ref 0.7–1.2)
EKG ATRIAL RATE: 147 BPM
EKG ATRIAL RATE: 66 BPM
EKG P AXIS: 37 DEGREES
EKG P-R INTERVAL: 204 MS
EKG Q-T INTERVAL: 342 MS
EKG Q-T INTERVAL: 426 MS
EKG QRS DURATION: 114 MS
EKG QRS DURATION: 114 MS
EKG QTC CALCULATION (BAZETT): 446 MS
EKG QTC CALCULATION (BAZETT): 489 MS
EKG R AXIS: 7 DEGREES
EKG R AXIS: 8 DEGREES
EKG T AXIS: 162 DEGREES
EKG T AXIS: 181 DEGREES
EKG VENTRICULAR RATE: 123 BPM
EKG VENTRICULAR RATE: 66 BPM
GFR AFRICAN AMERICAN: 13
GFR NON-AFRICAN AMERICAN: 10.8
GLUCOSE BLD-MCNC: 121 MG/DL (ref 70–99)
GLUCOSE BLD-MCNC: 139 MG/DL (ref 60–115)
GLUCOSE BLD-MCNC: 154 MG/DL (ref 60–115)
GLUCOSE BLD-MCNC: 197 MG/DL (ref 60–115)
HBA1C MFR BLD: 5.8 % (ref 4.8–5.9)
HCT VFR BLD CALC: 25.5 % (ref 42–52)
HDLC SERPL-MCNC: 24 MG/DL (ref 40–59)
HEMOGLOBIN: 8.8 G/DL (ref 14–18)
LDL CHOLESTEROL CALCULATED: 67 MG/DL (ref 0–129)
MCH RBC QN AUTO: 31.7 PG (ref 27–31.3)
MCHC RBC AUTO-ENTMCNC: 34.7 % (ref 33–37)
MCV RBC AUTO: 91.4 FL (ref 80–100)
PDW BLD-RTO: 14.6 % (ref 11.5–14.5)
PERFORMED ON: ABNORMAL
PLATELET # BLD: 149 K/UL (ref 130–400)
POTASSIUM SERPL-SCNC: 4.5 MEQ/L (ref 3.4–4.9)
RBC # BLD: 2.78 M/UL (ref 4.7–6.1)
SARS-COV-2, NAAT: NOT DETECTED
SODIUM BLD-SCNC: 137 MEQ/L (ref 135–144)
TRIGL SERPL-MCNC: 161 MG/DL (ref 0–150)
WBC # BLD: 4.3 K/UL (ref 4.8–10.8)

## 2021-11-03 PROCEDURE — 87635 SARS-COV-2 COVID-19 AMP PRB: CPT

## 2021-11-03 PROCEDURE — 6370000000 HC RX 637 (ALT 250 FOR IP): Performed by: INTERNAL MEDICINE

## 2021-11-03 PROCEDURE — 70551 MRI BRAIN STEM W/O DYE: CPT

## 2021-11-03 PROCEDURE — 36415 COLL VENOUS BLD VENIPUNCTURE: CPT

## 2021-11-03 PROCEDURE — 85027 COMPLETE CBC AUTOMATED: CPT

## 2021-11-03 PROCEDURE — 80048 BASIC METABOLIC PNL TOTAL CA: CPT

## 2021-11-03 PROCEDURE — 99233 SBSQ HOSP IP/OBS HIGH 50: CPT | Performed by: INTERNAL MEDICINE

## 2021-11-03 PROCEDURE — 93010 ELECTROCARDIOGRAM REPORT: CPT | Performed by: INTERNAL MEDICINE

## 2021-11-03 PROCEDURE — 80061 LIPID PANEL: CPT

## 2021-11-03 PROCEDURE — 83036 HEMOGLOBIN GLYCOSYLATED A1C: CPT

## 2021-11-03 PROCEDURE — 99233 SBSQ HOSP IP/OBS HIGH 50: CPT

## 2021-11-03 PROCEDURE — 2580000003 HC RX 258: Performed by: INTERNAL MEDICINE

## 2021-11-03 PROCEDURE — 93005 ELECTROCARDIOGRAM TRACING: CPT | Performed by: SPECIALIST

## 2021-11-03 RX ORDER — TORSEMIDE 10 MG/1
50 TABLET ORAL DAILY
Qty: 30 TABLET | Refills: 3 | Status: ON HOLD | OUTPATIENT
Start: 2021-11-03 | End: 2021-11-28 | Stop reason: HOSPADM

## 2021-11-03 RX ORDER — AMIODARONE HYDROCHLORIDE 200 MG/1
200 TABLET ORAL 2 TIMES DAILY
Qty: 30 TABLET | Refills: 3 | Status: ON HOLD | OUTPATIENT
Start: 2021-11-03 | End: 2021-11-28 | Stop reason: SDUPTHER

## 2021-11-03 RX ORDER — ATORVASTATIN CALCIUM 20 MG/1
20 TABLET, FILM COATED ORAL NIGHTLY
Qty: 30 TABLET | Refills: 3 | Status: ON HOLD | OUTPATIENT
Start: 2021-11-03 | End: 2021-11-28 | Stop reason: HOSPADM

## 2021-11-03 RX ORDER — AMLODIPINE BESYLATE 5 MG/1
5 TABLET ORAL DAILY
Status: DISCONTINUED | OUTPATIENT
Start: 2021-11-03 | End: 2021-11-03 | Stop reason: HOSPADM

## 2021-11-03 RX ORDER — AMLODIPINE BESYLATE 5 MG/1
5 TABLET ORAL DAILY
Qty: 30 TABLET | Refills: 3 | Status: SHIPPED | OUTPATIENT
Start: 2021-11-03

## 2021-11-03 RX ORDER — HYDRALAZINE HYDROCHLORIDE 100 MG/1
100 TABLET, FILM COATED ORAL EVERY 12 HOURS SCHEDULED
Qty: 60 TABLET | Refills: 3 | Status: SHIPPED | OUTPATIENT
Start: 2021-11-03

## 2021-11-03 RX ORDER — CARVEDILOL 25 MG/1
25 TABLET ORAL 2 TIMES DAILY
Qty: 60 TABLET | Refills: 3 | Status: ON HOLD | OUTPATIENT
Start: 2021-11-03 | End: 2021-11-28 | Stop reason: HOSPADM

## 2021-11-03 RX ADMIN — TORSEMIDE 50 MG: 10 TABLET ORAL at 09:26

## 2021-11-03 RX ADMIN — Medication 10 ML: at 09:25

## 2021-11-03 RX ADMIN — HYDRALAZINE HYDROCHLORIDE 100 MG: 100 TABLET, FILM COATED ORAL at 09:26

## 2021-11-03 RX ADMIN — AMLODIPINE BESYLATE 5 MG: 5 TABLET ORAL at 09:31

## 2021-11-03 RX ADMIN — CARVEDILOL 25 MG: 25 TABLET, FILM COATED ORAL at 09:26

## 2021-11-03 RX ADMIN — AMIODARONE HYDROCHLORIDE 200 MG: 200 TABLET ORAL at 09:25

## 2021-11-03 RX ADMIN — APIXABAN 2.5 MG: 2.5 TABLET, FILM COATED ORAL at 09:25

## 2021-11-03 ASSESSMENT — ENCOUNTER SYMPTOMS
SHORTNESS OF BREATH: 0
COUGH: 0
DIARRHEA: 0

## 2021-11-03 NOTE — PROGRESS NOTES
UA in the last 72 hours.     Objective:  Vitals: BP (!) 149/51   Pulse 66   Temp 99 °F (37.2 °C) (Oral)   Resp 16   Ht 5' 9\" (1.753 m)   Wt 216 lb (98 kg)   SpO2 95%   BMI 31.90 kg/m²    Wt Readings from Last 3 Encounters:   10/29/21 216 lb (98 kg)      24HR INTAKE/OUTPUT:  No intake or output data in the 24 hours ending 11/03/21 0848    General: alert, in no apparent distress  HEENT: normocephalic, atraumatic, anicteric  Neck: supple, no mass  Lungs: non-labored respirations, clear to auscultation bilaterally  Heart: regular rate and rhythm, no murmurs or rubs  Abdomen: soft, non-tender, non-distended  Ext: no cyanosis, no peripheral edema  Neuro: alert and oriented, no gross abnormalities  Psych: normal mood and affect  Skin: no rash      Electronically signed by Bhavana Rodríguez DO, MD

## 2021-11-03 NOTE — PROGRESS NOTES
CLINICAL PHARMACY NOTE: MEDS TO BEDS    Total # of Prescriptions Filled: 6   The following medications were delivered to the patient:  · Amlodipine 5mg tab  · Eliquis 2.5mg tab  · Atorvastatin 20mg tab  · Carvedilol 25mg tab  · Amiodarone 200mg tab  · Hydralazine 100mg tab    Additional Documentation:

## 2021-11-03 NOTE — PROGRESS NOTES
Subjective:   Chief Complaint:Shortness of Breath    Patient ID: Jarett Rios is a 81 y.o. male followed for mild sided weakness. This in the setting of hyperlipidemia lipidemia hypertension type 2 diabetes atrial fibrillation    The patient's family reports he has had some difficulty with his  but is overall stable. Past Medical History:   Diagnosis Date    CAD (coronary artery disease)     Hyperlipidemia      Past Surgical History:   Procedure Laterality Date    CARDIAC SURGERY      UPPER GASTROINTESTINAL ENDOSCOPY N/A 10/29/2021    EGD BIOPSY performed by Alia Horowitz MD at [facility]     Patient reports that he has never smoked. He has never used smokeless tobacco. He reports previous alcohol use. He reports previous drug use. History reviewed. No pertinent family history.   No Known Allergies  Current Facility-Administered Medications   Medication Dose Route Frequency Provider Last Rate Last Admin    amLODIPine (NORVASC) tablet 5 mg  5 mg Oral Daily Dev Patrice Bescak, DO   5 mg at 11/03/21 0931    carvedilol (COREG) tablet 25 mg  25 mg Oral BID Rickey Powell MD   25 mg at 11/03/21 0926    torsemide (DEMADEX) tablet 50 mg  50 mg Oral Daily Rickey Powell MD   50 mg at 11/03/21 8719    apixaban (ELIQUIS) tablet 2.5 mg  2.5 mg Oral BID Sabi Horvath MD   2.5 mg at 11/03/21 8917    amiodarone (CORDARONE) tablet 200 mg  200 mg Oral BID Sabi Horvath MD   200 mg at 11/03/21 0925    atorvastatin (LIPITOR) tablet 20 mg  20 mg Oral Nightly Sabi Horvath MD   20 mg at 11/02/21 2044    metoprolol (LOPRESSOR) injection 5 mg  5 mg IntraVENous Q6H PRN Sabi Horvath MD        sodium chloride flush 0.9 % injection 5-40 mL  5-40 mL IntraVENous 2 times per day Alia Horowitz MD   10 mL at 11/02/21 2045    sodium chloride flush 0.9 % injection 5-40 mL  5-40 mL IntraVENous PRN Alia Horowitz MD        0.9 % sodium chloride infusion  25 mL IntraVENous PRN Alia Horowitz MD Dru Carbon hydrALAZINE (APRESOLINE) tablet 100 mg  100 mg Oral 2 times per day Malinda Araya MD   100 mg at 11/03/21 0926    benzocaine-menthol (CEPACOL SORE THROAT) lozenge 1 lozenge  1 lozenge Oral Q2H PRN Malinda Araya MD   1 lozenge at 10/30/21 0828    phenylephrine (GABBY-SYNEPHRINE) 0.25 % nasal spray 1 spray  1 spray Each Nostril Q6H PRN Gladys Delacruz MD        sodium chloride flush 0.9 % injection 5-40 mL  5-40 mL IntraVENous 2 times per day Malinda Araya MD   10 mL at 11/03/21 0925    sodium chloride flush 0.9 % injection 5-40 mL  5-40 mL IntraVENous PRN Malinda Araya MD        0.9 % sodium chloride infusion  25 mL IntraVENous PRN Malinda Araya MD        acetaminophen (TYLENOL) tablet 650 mg  650 mg Oral Q4H PRN Malinda Araya MD   650 mg at 10/31/21 1016    ondansetron (ZOFRAN-ODT) disintegrating tablet 4 mg  4 mg Oral Q8H PRN Malinda Araya MD        Or    ondansetron [facility]) injection 4 mg  4 mg IntraVENous Q6H PRN Malinda Araya MD        glucose (GLUTOSE) 40 % oral gel 15 g  15 g Oral PRN Madolyn Lockney, APRN - NP        dextrose 50 % IV solution  12.5 g IntraVENous PRN Madolyn Lockney, APRN - NP        glucagon (rDNA) injection 1 mg  1 mg IntraMUSCular PRN Madolyn Lockney, APRN - NP        dextrose 5 % solution  100 mL/hr IntraVENous PRN Madolyn Lockney, APRN - NP        insulin glargine (LANTUS) injection vial 15 Units  0.15 Units/kg SubCUTAneous Nightly Madolyn Lockney, APRN - NP   15 Units at 11/02/21 2044    insulin lispro (HUMALOG) injection vial 0-12 Units  0-12 Units SubCUTAneous TID WC Madolyn Lockney, APRN - NP   2 Units at 11/02/21 1236    insulin lispro (HUMALOG) injection vial 0-6 Units  0-6 Units SubCUTAneous Nightly Madolyn Lockney, APRN - NP   1 Units at 11/01/21 0747      Problem List     None           Objective:   BP (!) 149/51   Pulse 66   Temp 99 °F (37.2 °C) (Oral)   Resp 16   Ht 5' 9\" (1.753 m)   Wt 216 lb (98 kg)   SpO2 95%   BMI 31.90 kg/m²     Today his right upper extremity is full in power including elbow and trouble flexion wrist extension and finger extension. CT SOFT TISSUE NECK WO CONTRAST    Result Date: 10/29/2021  NO RADIOPAQUE FOREIGN BODY. PROMINENT ANTERIOR CERVICAL SPURRING. CT CHEST WO CONTRAST    Result Date: 10/27/2021  1. CHF 2. Bilateral pleural effusions with atelectasis and/or infiltrate 3. Enlarged main pulmonary artery. Reformatted pulmonary arterial hypertension   FL MODIFIED BARIUM SWALLOW W VIDEO    Result Date: 10/29/2021  Moderate oral and pharyngeal dysfunction without aspiration. Please refer to speech pathology  report for recommendations. Lab Results   Component Value Date    WBC 4.3 11/03/2021    RBC 2.78 11/03/2021    HGB 8.8 11/03/2021    HCT 25.5 11/03/2021    MCV 91.4 11/03/2021    MCH 31.7 11/03/2021    MCHC 34.7 11/03/2021    RDW 14.6 11/03/2021     11/03/2021     Lab Results   Component Value Date     11/03/2021    K 4.5 11/03/2021     11/03/2021    CO2 27 11/03/2021    BUN 48 11/03/2021    CREATININE 5.12 11/03/2021    GFRAA 13.0 11/03/2021    LABGLOM 10.8 11/03/2021    GLUCOSE 145 11/02/2021    GLUCOSE 228 06/15/2021    PROT 6.2 10/27/2021    LABALBU 3.3 10/27/2021    LABALBU 3.3 06/15/2021    CALCIUM 8.2 11/03/2021    BILITOT 0.7 10/27/2021    ALKPHOS 113 10/27/2021    AST 15 10/27/2021    ALT 10 10/27/2021     No results found for: PROTIME, INR  Lab Results   Component Value Date    TSH 5.54 06/15/2021    IRON 77 06/15/2021    TIBC 301 06/15/2021     Lab Results   Component Value Date    TRIG 161 11/03/2021    HDL 24 11/03/2021    LDLCALC 67 11/03/2021     No components found for: A1C  No results found for: LABAMPH, BARBSCNU, LABBENZ, CANNAB, COCAINESCRN, LABMETH, OPIATESCREENURINE, PHENCYCLIDINESCREENURINE, PPXUR, ETOH  No results found for: LITHIUM, DILFRTOT, VALPROATE    Assessment & Plan          Chart, labs,and images reviewed. MRI shows left subcortical stroke, lacunar.   There is also some encephalomalacia in the left cerebellar area    Triglycerides are a bit high and I would suggest dietary intervention    Hemoglobin A1c is pending. Continue apixaban.

## 2021-11-03 NOTE — DISCHARGE SUMMARY
Cardiology Discharge Summary      Patient Identification:  Janny Espitia  : 1935  MRN: [id number]   Account: [de-identified]     Admit date: 10/27/2021  Discharge date: 11/3/2021   Attending provider: Keyla Ng MD        Primary care provider: Marcy Echols DO        Admission Diagnoses:  <principal problem not specified>      Combined CHF  CKD  CMP  PAF           Discharge Diagnoses: Active Hospital Problems     Diagnosis Date Noted    Esophageal dysphagia [R13.19]         Priority: Low    CHF (congestive heart failure), NYHA class I, acute on chronic, combined ([facility] [address] [I50.43] 10/27/2021       Priority: Low            Hospital Course:   Janny Espitia is a 81 y.o. male admitted to [facility] on 10/27/2021 for .            Procedures:   1.       Consults:   IP CONSULT TO HOSPITALIST  IP CONSULT TO NEPHROLOGY  IP CONSULT TO OTOLARYNGOLOGY  IP CONSULT TO GI  IP CONSULT TO CARDIOLOGY  IP CONSULT TO NEUROLOGY     Examination:  BP (!) 158/59   Pulse 69   Temp 99 °F (37.2 °C) (Oral)   Resp 18   Ht 5' 9\" (1.753 m)   Wt 216 lb (98 kg)   SpO2 95%   BMI 31.90 kg/m²    Physical Exam   Constitutional: No distress. He appears chronically ill and acutely ill. HENT:   Normal cephalic and Atraumatic   Eyes: Pupils are equal, round, and reactive to light. Neck: Thyroid normal. No JVD present. No neck adenopathy. No thyromegaly present. Cardiovascular: Normal rate, regular rhythm, intact distal pulses and normal pulses. Murmur heard. Pulmonary/Chest: Effort normal and breath sounds normal. He has no wheezes. He has no rales. He exhibits no tenderness. Abdominal: Soft. Bowel sounds are normal. There is no abdominal tenderness. Musculoskeletal:         General: Edema (trace) present. No tenderness. Normal range of motion. Cervical back: Normal range of motion and neck supple.    Neurological: He is alert and oriented to person, place, and time. Skin: Skin is warm. No cyanosis. Nails show no clubbing.         Medications:      Current Discharge Medication List           CONTINUE these medications which have NOT CHANGED     Details   glimepiride (AMARYL) 4 MG tablet Take 4 mg by mouth every morning (before breakfast)       SITagliptin (JANUVIA) 100 MG tablet Take 100 mg by mouth daily       lisinopril-hydroCHLOROthiazide (PRINZIDE;ZESTORETIC) 10-12.5 MG per tablet Take 1 tablet by mouth daily       metFORMIN (GLUCOPHAGE) 1000 MG tablet Take 1,000 mg by mouth 2 times daily (with meals)       metoprolol succinate (TOPROL XL) 100 MG extended release tablet Take 100 mg by mouth daily       amLODIPine-atorvastatatin (CADUET) 10-20 MG per tablet Take 1 tablet by mouth daily       Multiple Vitamins-Minerals (CENTRUM SILVER ADULT 50+) TABS Take 1 tablet by mouth daily       nitroGLYCERIN (NITROSTAT) 0.4 MG SL tablet Place 0.4 mg under the tongue every 5 minutes as needed for Chest pain up to max of 3 total doses.  If no relief after 1 dose, call 911.                 Significant Diagnostics:   Radiology: Echocardiogram complete 2D with doppler with color     Result Date: 10/28/2021  Transthoracic Echocardiography Report (TTE)  Demographics   Patient Name    Prisca Parra  Gender               Male                  J   Patient Number  [id number]       Race                                                   Ethnicity   Visit Number    [id number]      Room Number          F628   Corporate ID                   Date of Study        10/28/2021   Accession       [id number]     Referring Physician  Julita Persaud MD  Number   Date of Birth   1935     Sonographer          Ciara Mcclain   Age             81 year(s)     Interpreting         [facility])                                 Physician            Cardiology                                                      [address] [place] [address]  Procedure Type of Study   TTE procedure:ECHO COMPLETE 2D W/DOP W/COLOR. Procedure Date Date: 10/28/2021 Start: 09:28 AM Study Location: Portable Technical Quality: Adequate visualization Indications:Congestive heart failure. Patient Status: Routine Height: 69 inches Weight: 224 pounds BSA: 2.17 m^2 BMI: 33.08 kg/m^2 BP: 169/57 mmHg  Conclusions   Summary  Normal mitral valve structure and function. Trace (1+) mitral regurgitation is present. Moderately dilated left atrium. Left ventricular ejection fraction is visually estimated at 45%. Pseudonormal filling pattern noted. Signature   ----------------------------------------------------------------  Electronically signed by Danielle Alexandra(Interpreting physician)  on 10/28/2021 05:58 PM  ----------------------------------------------------------------   Findings  Left Ventricle Left ventricular ejection fraction is visually estimated at 45%. Pseudonormal filling pattern noted. Right Ventricle Normal right ventricle structure and function. Normal right ventricle systolic pressure. Left Atrium Moderately dilated left atrium. Right Atrium Normal right atrium. Mitral Valve Normal mitral valve structure and function. Trace (1+) mitral regurgitation is present. Tricuspid Valve Normal tricuspid valve structure and function. Aortic Valve Normal aortic valve structure and function. Pulmonic Valve Normal pulmonic valve structure and function. Pericardial Effusion No evidence of pericardial effusion. Pleural Effusion No evidence of pleural effusion. Aorta \ Miscellaneous Miscellaneous normal findings were found. M-Mode Measurements (cm)   LVIDd: 5.31 cm                         LVIDs: 4 cm  IVSd: 1.69 cm                          IVSs: 1.71 cm  LVPWd: 1.35 cm                         LVPWs: 1.53 cm  Rt. Vent.  Dimension: 2.46 cm           AO Root Dimension: 3.21 cm                                         ACS: 1.9 cm                                         LVOT: 2.11 cm  Doppler Measurements:   AV Velocity:0.03 m/s MV Peak E-Wave: 0.85 m/s  AV Peak Gradient: 5.2 mmHg             MV Peak A-Wave: 0.64 m/s  AV Mean Gradient: 2.69 mmHg  AV Area (Continuity):3.06 cm^2  Valves  Mitral Valve   Peak E-Wave: 0.85 m/s                 Peak A-Wave: 0.64 m/s                                        E/A Ratio: 1.34                                        Peak Gradient: 2.91 mmHg                                        Deceleration Time: 151.1 msec   Tissue Doppler   E' Septal Velocity: 0.05 m/s  E' Lateral Velocity: 0.07 m/s   Aortic Valve   Peak Velocity: 1.14 m/s                Mean Velocity: 0.77 m/s  Peak Gradient: 5.2 mmHg                Mean Gradient: 2.69 mmHg  Area (continuity): 3.06 cm^2  AV VTI: 30.04 cm   Cusp Separation: 1.9 cm   LVOT   Peak Velocity: 1.01 m/s               Mean Velocity: 0.62 m/s  Peak Gradient: 3.44 mmHg              Mean Gradient: 1.8 mmHg  LVOT Diameter: 2.11 cm                LVOT VTI: 26.26 cm  Structures  Left Atrium   LA Volume/Index: 76.18 ml /35 m^2             LA Area: 22.46 cm^2   Left Ventricle   Diastolic Dimension: 4.39 cm          Systolic Dimension: 4 cm  Septum Diastolic: 0.24 cm             Septum Systolic: 7.52 cm  PW Diastolic: 3.01 cm                 PW Systolic: 5.77 cm                                        FS: 24.7 %  LV EDV/LV EDV Index: 135.73 ml/63 m^2 LV ESV/LV ESV Index: 69.83 ml/32 m^2  EF Calculated: 48.6 %                 LV Length: 7.35 cm   LVOT Diameter: 2.11 cm   Right Ventricle   Diastolic Dimension: 1.80 cm  Aorta/ Miscellaneous Aorta   Aortic Root: 3.21 cm  LVOT Diameter: 2.11 cm       CT SOFT TISSUE NECK WO CONTRAST     Result Date: 10/29/2021  EXAMINATION: CT SOFT TISSUE NECK WO CONTRAST DATE AND TIME:10/28/2021 8:09 PM CLINICAL HISTORY: Acute neck pain. foreign body sensation COMPARISON: None TECHNIQUE: Sequential axial CT images were obtained from the skull base to the thoracic inlet.  No IV contrast.  All CT scans at this facility use dose modulation, iterative reconstruction, and/or weight based dosing when appropriate to reduce radiation dose to as low as reasonably achievable. There is no radiopaque foreign body. There are exuberant anterior osteophytes from C2 through C6 that encroach on the retropharyngeal soft tissues most striking in the hypopharynx. Occasionally these prominent osteophytes can be a source of dysphagia. Anu Amaya Pharyngeal mucosal space, deep spaces, and infrahyoid neck otherwise unremarkable. No cervical adenopathy. Scans through the upper neck which included portion of the lower head demonstrate age-related parenchymal volume loss changes.      NO RADIOPAQUE FOREIGN BODY. PROMINENT ANTERIOR CERVICAL SPURRING.      CT CHEST WO CONTRAST     Result Date: 10/27/2021  HISTORY: Tiera Hartman is a Male of 81 years age. DIAGNOSIS:  sob COMPARISON: None available. TECHNIQUE: Thin spiral axial CT was performed from the thoracic inlet to the lung bases, without intravenous contrast administration. 2-D reformatted axial, sagittal and coronal images were obtained. 3-D MIPS images were also performed. FINDINGS: Moderate size pleural effusions are seen bilaterally. The heart is mildly enlarged. Increased interstitial markings are seen bilaterally. Atelectasis or consolidation is seen in both bases. No aneurysm is seen. The main pulmonary artery measures 31 mm. Atherosclerotic calcifications are seen in the great vessels and superior mediastinum, aortic arch and coronary stents and/or calcifications are seen. There is evidence of median sternotomy. No pericardial effusion is seen. Degenerative changes are seen in the spine. Limited survey views of the upper abdomen show calcifications in the spleen consistent with old granulomatous disease. The gallbladder is surgically absent. There are extensive atherosclerotic calcifications seen in the visualized portion of the abdomen.      1. CHF 2. Bilateral pleural effusions with atelectasis and/or infiltrate 3.  Enlarged main pulmonary artery. Reformatted pulmonary arterial hypertension All CT scans at this facility use dose modulation, iterative reconstruction, and/or weight based dosing when appropriate to reduce radiation dose to as low as reasonably achievable.      FL MODIFIED BARIUM SWALLOW W VIDEO     Result Date: 10/29/2021  EXAMINATION: FL MODIFIED BARIUM SWALLOW W VIDEO DATE AND TIME:10/28/2021 2:50 PM CLINICAL HISTORY: Dysphagia. Possible aspiration. dysphagia; odynophagia  COMPARISON: None available. Technique: A modified barium swallow study was performed in the Radiology Suite in conjunction with Speech Therapy. The patient was seated upright throughout this assessment. Multiple consistencies were used to evaluate swallowing function and safety. Videofluoroscopic imaging was utilized (this is considered equivalent to one image for purposes of compliance with MIPS). Fluoroscopy time was 3.3 minutes Findings:     Oral phase: There was prolonged mastication and bolus formation with some premature spillage of barium to the level of the valleculae and  pyriform sinuses     Pharyngeal Phase: There was some pharyngeal dysfunction without evidence of penetration or aspiration. Esophageal phase: The patient's upper esophageal sphincter opens normally. There is no diverticulum stricture or fistula. Residual:There was some vallecular and pyriform sinus residue which adequately cleared.      Moderate oral and pharyngeal dysfunction without aspiration.  Please refer to speech pathology  report for recommendations.         Labs:   Recent Results         Recent Results (from the past 72 hour(s))   Basic Metabolic Panel     Collection Time: 10/30/21  9:03 AM   Result Value Ref Range     Sodium 136 135 - 144 mEq/L     Potassium 4.0 3.4 - 4.9 mEq/L     Chloride 101 95 - 107 mEq/L     CO2 26 20 - 31 mEq/L     Anion Gap 9 9 - 15 mEq/L     Glucose 203 (H) 70 - 99 mg/dL     BUN 33 (H) 8 - 23 mg/dL     CREATININE 4.26 (H) 0.70 - 1.20 mg/dL     GFR Non- 13.3 (L) >60     GFR  16.1 (L) >60     Calcium 8.0 (L) 8.5 - 9.9 mg/dL   CBC     Collection Time: 10/30/21  9:03 AM   Result Value Ref Range     WBC 6.3 4.8 - 10.8 K/uL     RBC 3.00 (L) 4.70 - 6.10 M/uL     Hemoglobin 9.5 (L) 14.0 - 18.0 g/dL     Hematocrit 28.3 (L) 42.0 - 52.0 %     MCV 94.6 80.0 - 100.0 fL     MCH 31.8 (H) 27.0 - 31.3 pg     MCHC 33.7 33.0 - 37.0 %     RDW 15.2 (H) 11.5 - 14.5 %     Platelets 138 202 - 012 K/uL   POCT Glucose     Collection Time: 10/30/21 11:16 AM   Result Value Ref Range     POC Glucose 159 (H) 60 - 115 mg/dl     Performed on ACCU-CHEK     POCT Glucose     Collection Time: 10/30/21  1:00 PM   Result Value Ref Range     POC Glucose 188 (H) 60 - 115 mg/dl     Performed on ACCU-CHEK     Troponin     Collection Time: 10/30/21  1:41 PM   Result Value Ref Range     Troponin 0.100 (HH) 0.000 - 0.010 ng/mL   POCT Glucose     Collection Time: 10/30/21  4:17 PM   Result Value Ref Range     POC Glucose 226 (H) 60 - 115 mg/dl     Performed on ACCU-CHEK     POCT Glucose     Collection Time: 10/30/21  8:12 PM   Result Value Ref Range     POC Glucose 168 (H) 60 - 115 mg/dl     Performed on ACCU-CHEK     POCT Glucose     Collection Time: 10/31/21  5:51 AM   Result Value Ref Range     POC Glucose 125 (H) 60 - 115 mg/dl     Performed on ACCU-CHEK     Basic Metabolic Panel     Collection Time: 10/31/21  7:47 AM   Result Value Ref Range     Sodium 138 135 - 144 mEq/L     Potassium 4.1 3.4 - 4.9 mEq/L     Chloride 102 95 - 107 mEq/L     CO2 25 20 - 31 mEq/L     Anion Gap 11 9 - 15 mEq/L     Glucose 154 (H) 70 - 99 mg/dL     BUN 36 (H) 8 - 23 mg/dL     CREATININE 4.57 (H) 0.70 - 1.20 mg/dL     GFR Non- 12.3 (L) >60     GFR  14.9 (L) >60     Calcium 8.1 (L) 8.5 - 9.9 mg/dL   CBC     Collection Time: 10/31/21  7:47 AM   Result Value Ref Range     WBC 6.6 4.8 - 10.8 K/uL     RBC 2.72 (L) 4.70 - 6.10 M/uL     Hemoglobin 8.8 (L) 14.0 - 18.0 g/dL     Hematocrit 25.7 (L) 42.0 - 52.0 %     MCV 94.4 80.0 - 100.0 fL     MCH 32.3 (H) 27.0 - 31.3 pg     MCHC 34.2 33.0 - 37.0 %     RDW 14.9 (H) 11.5 - 14.5 %     Platelets 820 782 - 587 K/uL   POCT Glucose     Collection Time: 10/31/21 11:02 AM   Result Value Ref Range     POC Glucose 206 (H) 60 - 115 mg/dl     Performed on ACCU-CHEK     POCT Glucose     Collection Time: 10/31/21  4:14 PM   Result Value Ref Range     POC Glucose 191 (H) 60 - 115 mg/dl     Performed on ACCU-CHEK     POCT Glucose     Collection Time: 10/31/21  9:10 PM   Result Value Ref Range     POC Glucose 198 (H) 60 - 115 mg/dl     Performed on ACCU-CHEK     Basic Metabolic Panel     Collection Time: 11/01/21  7:07 AM   Result Value Ref Range     Sodium 138 135 - 144 mEq/L     Potassium 4.3 3.4 - 4.9 mEq/L     Chloride 101 95 - 107 mEq/L     CO2 28 20 - 31 mEq/L     Anion Gap 9 9 - 15 mEq/L     Glucose 124 (H) 70 - 99 mg/dL     BUN 38 (H) 8 - 23 mg/dL     CREATININE 5.02 (H) 0.70 - 1.20 mg/dL     GFR Non- 11.0 (L) >60     GFR  13.3 (L) >60     Calcium 8.0 (L) 8.5 - 9.9 mg/dL   CBC     Collection Time: 11/01/21  7:07 AM   Result Value Ref Range     WBC 5.2 4.8 - 10.8 K/uL     RBC 2.76 (L) 4.70 - 6.10 M/uL     Hemoglobin 8.9 (L) 14.0 - 18.0 g/dL     Hematocrit 25.6 (L) 42.0 - 52.0 %     MCV 92.5 80.0 - 100.0 fL     MCH 32.3 (H) 27.0 - 31.3 pg     MCHC 34.9 33.0 - 37.0 %     RDW 14.7 (H) 11.5 - 14.5 %     Platelets 957 134 - 033 K/uL   POCT Glucose     Collection Time: 11/01/21  7:08 AM   Result Value Ref Range     POC Glucose 126 (H) 60 - 115 mg/dl     Performed on ACCU-CHEK     POCT Glucose     Collection Time: 11/01/21  4:12 PM   Result Value Ref Range     POC Glucose 228 (H) 60 - 115 mg/dl     Performed on ACCU-CHEK     POCT Glucose     Collection Time: 11/01/21  8:23 PM   Result Value Ref Range     POC Glucose 169 (H) 60 - 115 mg/dl     Performed on ACCU-CHEK     POCT Glucose     Collection Time: 11/02/21  5:29 AM   Result Value Ref Range     POC Glucose 150 (H) 60 - 115 mg/dl     Performed on ACCU-CHEK     Basic Metabolic Panel     Collection Time: 11/02/21  6:30 AM   Result Value Ref Range     Sodium 136 135 - 144 mEq/L     Potassium 4.6 3.4 - 4.9 mEq/L     Chloride 99 95 - 107 mEq/L     CO2 27 20 - 31 mEq/L     Anion Gap 10 9 - 15 mEq/L     Glucose 145 (H) 70 - 99 mg/dL     BUN 42 (H) 8 - 23 mg/dL     CREATININE 5.03 (H) 0.70 - 1.20 mg/dL     GFR Non- 11.0 (L) >60     GFR  13.3 (L) >60     Calcium 8.1 (L) 8.5 - 9.9 mg/dL   CBC     Collection Time: 11/02/21  6:30 AM   Result Value Ref Range     WBC 5.7 4.8 - 10.8 K/uL     RBC 2.92 (L) 4.70 - 6.10 M/uL     Hemoglobin 9.3 (L) 14.0 - 18.0 g/dL     Hematocrit 27.0 (L) 42.0 - 52.0 %     MCV 92.4 80.0 - 100.0 fL     MCH 31.9 (H) 27.0 - 31.3 pg     MCHC 34.5 33.0 - 37.0 %     RDW 14.8 (H) 11.5 - 14.5 %     Platelets 102 742 - 575 K/uL                Follow-up visits:   [facility]  [address] [place] [address] [phone]              Assessment:Plan        Active Hospital Problems     Diagnosis Date Noted    Esophageal dysphagia [R13.19]         Priority: Low    CHF (congestive heart failure), NYHA class I, acute on chronic, combined ([facility]) [I50.43] 10/27/2021       Priority: Low      1. Acute on Chronic Combined Decompensted CHF - discussed with Dr. Gladys Bell- will start Demadex 50 qd. Will need to have labs at [facility]. 2. CKD- pt absolutely refuses HD. 3. PAF- was started on Amiodarone and DIg. + Low dose Eliquis. Converted to SR.  stopped Dig. Advance Coreg. 4. LVEF 45%  5. CABG-x5 - ASA BB Statin. 6. HTN - added Norvasc today   7. DM- on Insulin  8. Odynophagia- ENT evaluation negative.  Gastric Polyp found and removed. There were no Esophageal pathology. Symptoms resolved.    9. HPL - high dose Statin  10. PTOT  11. Precert for SNF .  Dc anytime                    Electronically signed by Ute Monsalve MD on 11/2/2021 at 8:23 AM

## 2021-11-03 NOTE — PROGRESS NOTES
Comprehensive Nutrition Assessment    Type and Reason for Visit:  Initial, RD Nutrition Re-Screen/LOS (Day 7 LOS)    Nutrition Recommendations/Plan:   Continue Carb Control 4 diet. Discontinue Low Sodium restriction and replace with ALEX. Start diabetic ONS TID. Consider adding an appetite stimulant    Nutrition Assessment:  Pt is at high risk for malnutrition due to inadequate po intake > 2 weeks. Swallowing difficulty on admission resolved. Pt weak and lethargic. Will replace low sodium restriction with ALEX until po intake improves, and a diabetic ONS TID. Consider adding an appetite stimulant    Malnutrition Assessment:  Malnutrition Status: At risk for malnutrition (Comment)    Context:  Chronic Illness     Findings of the 6 clinical characteristics of malnutrition:  Energy Intake:  7 - 75% or less estimated energy requirements for 1 month or longer  Weight Loss:  Unable to assess     Body Fat Loss:  No significant body fat loss     Muscle Mass Loss:  No significant muscle mass loss    Fluid Accumulation:  Unable to assess     Strength:  Not Performed    Estimated Daily Nutrient Needs:  Energy (kcal):  3268-3892 (kg x 15-18); Weight Used for Energy Requirements:  Current (98 kg)     Protein (g):  58-72 kg (kg IBW x 0.8-1.0); Weight Used for Protein Requirements:  Ideal (72.7 kg)        Fluid (ml/day):  ~1760 ml; Method Used for Fluid Requirements:  1 ml/kcal      Nutrition Related Findings:  PMH: CKD-4, CHF, DM2. Labs: elevated BUN/CR, Gluc 197, HbA1C: 6.7, trace BLE edema noted, BM 11/2. Per nephro, pt reports not wanting HD. Pt asleep at time of visit, per daugter: Pt c/o difficulty swallowing on admission. MBS completed (10/28) with results WNL, ENT exam unremarkable, EGD (10/29): 2 polyps in throat removed. per daughter, this provided relief and no further c/o of swallowing difficulty. Daughter reported poor appetite pta and currently.       Wounds:  None       Current Nutrition Therapies:    ADULT DIET; Regular; 4 carb choices (60 gm/meal); Low Sodium (2 gm); advance as tolerated (1-25%)    Anthropometric Measures:  · Height: 5' 9\" (175.3 cm)  · Current Body Weight: 216 lb (98 kg) (11/3)   · Admission Body Weight: 224 lb (101.6 kg) ([facility])    · Usual Body Weight:  (N/A)     · Ideal Body Weight: 160 lbs; % Ideal Body Weight  > 100%   · BMI: 31.9  · BMI Categories: Obese Class 1 (BMI 30.0-34. 9)       Nutrition Diagnosis:   · Inadequate oral intake related to impaired respiratory function, other (comment) (weakness) as evidenced by intake 0-25%, poor intake prior to admission    Nutrition Interventions:   Food and/or Nutrient Delivery:  Modify Current Diet, Start Oral Nutrition Supplement (Continue Carb Control 4 diet. Discontinue Low Sodium restriction and replace with ALEX. Start diabetic ONS TID.   Consider adding an appetite stimulant)  Nutrition Education/Counseling:  Education not appropriate   Coordination of Nutrition Care:  Continue to monitor while inpatient    Goals:  PO intake to improve > 50% of meals and supplements, stable wt       Nutrition Monitoring and Evaluation:   Food/Nutrient Intake Outcomes:  Food and Nutrient Intake, Supplement Intake  Physical Signs/Symptoms Outcomes:  Biochemical Data, Fluid Status or Edema, Weight     Electronically signed by Sal Esquivel RD, LD on 11/3/21 at 2:23 PM EDT

## 2021-11-03 NOTE — PLAN OF CARE
Nutrition Problem #1: Inadequate oral intake  Intervention: Food and/or Nutrient Delivery: Modify Current Diet, Start Oral Nutrition Supplement (Continue Carb Control 4 diet. Discontinue Low Sodium restriction and replace with ALEX. Start diabetic ONS TID.   Consider adding an appetite stimulant)  Nutritional Goals: PO intake to improve > 50% of meals and supplements, stable wt

## 2021-11-03 NOTE — PROGRESS NOTES
1600 - Called report to Yvonne Hendrickson at [facility] for a d/c and  time of 5pm.     Patient a/o x 3. Denies pain. NIH -0 Denies HA, no neuro deficits noted. Daughter states that his R side has been weaker. Paged Neuro for MRI order. Swallowing pills whole with thin liquids. Denies SOB Sats WNL on 4 L NC. States that he feels so much better. Patient refused lunch, Held insulin due to this.

## 2021-11-03 NOTE — CARE COORDINATION
Aloma Labor, remains following. The patient's insurance has been precerted for [facility]. The LSW talked to Bill Holstein, admissions at [facility], and [facility] has no male beds available. Electronically signed by REFUGIO Islas on 11/3/21 at 2:35 PM EDT    The patient's RN states the family would like to talk with the LSW. The patient's daughter and spouse state they are aware the discharge plan is [facility]. The patient's daughter inquired if [facility] has any beds available. The LSW updated the patient's spouse and her daughter that there are no male beds at [facility] per admissions. Electronically signed by REFUGIO Islas on 11/3/21 at 2:46 PM EDT    Per the RN, the patient can discharge today to [facility]. The LSW met with the patient's daughter, Natalio Herrera, and she verbally signed the IMM. The patient's daughter expressed an understanding of the medicare appeal process. The LSW updated Pavithra Abraham, the patient would discharge today. The patient's RN is aware the patient will need a covid test prior to discharge. Electronically signed by REFUGIO Islas on 11/3/21 at 3:06 PM EDT    The LSW called and talked to Zoila Mohr, and set the discharge for 5 pm to [facility]. The patient's RN, Pavithra Abraham and family were updated.  Electronically signed by REFUGIO Islas on 11/3/21 at 3:24 PM EDT

## 2021-11-03 NOTE — PROGRESS NOTES
Progress Note  Date:11/3/2021       XFIS:[id number]  Patient Dana Souza     YOB: 1935     Age:86 y.o. Subjective    Subjective:  Symptoms:  He reports weakness. No shortness of breath, malaise, cough, chest pain, headache, chest pressure, anorexia, diarrhea or anxiety. Review of Systems   Respiratory: Negative for cough and shortness of breath. Cardiovascular: Negative for chest pain. Gastrointestinal: Negative for anorexia and diarrhea. Neurological: Positive for weakness. Objective         Vitals Last 24 Hours:  TEMPERATURE:  Temp  Av.8 °F (37.1 °C)  Min: 98.5 °F (36.9 °C)  Max: 99 °F (37.2 °C)  RESPIRATIONS RANGE: Resp  Av  Min: 16  Max: 16  PULSE OXIMETRY RANGE: SpO2  Av.3 %  Min: 95 %  Max: 99 %  PULSE RANGE: Pulse  Av.7  Min: 60  Max: 68  BLOOD PRESSURE RANGE: Systolic (95YQF), DKF:208 , Min:149 , IHM:455   ; Diastolic (28BQH), FSI:01, Min:50, Max:62    I/O (24Hr): Intake/Output Summary (Last 24 hours) at 11/3/2021 1027  Last data filed at 11/3/2021 0935  Gross per 24 hour   Intake 240 ml   Output 525 ml   Net -285 ml     Objective:  General Appearance:  Comfortable, well-appearing and in no acute distress. Vital signs: (most recent): Blood pressure (!) 149/51, pulse 66, temperature 99 °F (37.2 °C), temperature source Oral, resp. rate 16, height 5' 9\" (1.753 m), weight 216 lb (98 kg), SpO2 95 %. HEENT: Normal HEENT exam.    Lungs:  Normal effort. Heart: Normal rate. Regular rhythm. Abdomen: Abdomen is soft. Bowel sounds are normal.   There is no epigastric area or suprapubic area tenderness. Pulses: Distal pulses are intact. Neurological: Patient is alert. Pupils:  Pupils are equal, round, and reactive to light. Skin:  Warm.       Labs/Imaging/Diagnostics    Labs:  CBC:  Recent Labs     21  0707 21  0630 21  0650   WBC 5.2 5.7 4.3*   RBC 2.76* 2.92* 2.78*   HGB 8.9* 9.3* 8.8*   HCT 25.6* 27.0* 25.5*   MCV 92.5 92.4 91.4   RDW 14.7* 14.8* 14.6*    158 149     CHEMISTRIES:  Recent Labs     11/01/21  0707 11/02/21  0630 11/03/21  0650    136 137   K 4.3 4.6 4.5    99 100   CO2 28 27 27   BUN 38* 42* 48*   CREATININE 5.02* 5.03* 5.12*   GLUCOSE 124* 145*  --      PT/INR:No results for input(s): PROTIME, INR in the last 72 hours. APTT:No results for input(s): APTT in the last 72 hours. LIVER PROFILE:No results for input(s): AST, ALT, BILIDIR, BILITOT, ALKPHOS in the last 72 hours. Imaging Last 24 Hours:  No results found. Assessment//Plan           Hospital Problems         Last Modified POA    CHF (congestive heart failure), NYHA class I, acute on chronic, combined ([facility] [address] 10/27/2021 Yes    Esophageal dysphagia 10/29/2021 Yes      Acute on chroicn CHF  SHANDRA on CK stage IV  afib with RVR  DM2    Assessment & Plan    11/1: Lower extremity swelling is less compared to before, continue with current management. Denies any needs. Feeling better. No overnight events. Continue oxygen therapy to keep oxygen above 92%, diabetes is stable. 11/2: Patient was seen and evaluated. No overnight events. No new complaints. Patient can be discharged anytime. Spoke to nursing. 11/3: Patient was seen and evaluated. There is no new deficits or weakness of the upper extremities and lower extremities.  Pt is awaiting for precdevang, c/w current management  Electronically signed by Terri Spencer MD on 11/1/21 at 12:38 PM EDT

## 2021-11-03 NOTE — PROGRESS NOTES
[facility] Occupational Therapy      Date: 11/3/2021  Patient Name: Stephen Burgess        MRN: [id number]  Account: [de-identified]   : 1935  (81 y.o.)  Room: [place] [address]    Chart reviewed, attempted OT at [address] [place] [address]. Patient not seen 2° to:    Pt in bed sleeping upon arrival with daughter at bedside. Pt easily wakes with light tactile cues, however, quickly returns to sleep. Pt unable to remain awake to participate in therapy session. Spoke to Rell Sharma RN aware. Will attempt again when able.     Electronically signed by ISRA Jane on 11/3/2021 at 11:57 AM

## 2021-11-03 NOTE — PROGRESS NOTES
A special day for patient. Today is their 66th weeding anniversary. PT was with his spouse and children when  visited. They shared some life's pleasant memories as they process the information received about patient's prognosis.  provided emotional and spiritual support. Family members were comforted in their moment of mixed feelings.

## 2021-11-04 ENCOUNTER — OFFICE VISIT (OUTPATIENT)
Dept: GERIATRIC MEDICINE | Age: 86
End: 2021-11-04
Payer: MEDICARE

## 2021-11-04 DIAGNOSIS — I48.0 PAF (PAROXYSMAL ATRIAL FIBRILLATION) (HCC): ICD-10-CM

## 2021-11-04 DIAGNOSIS — R13.19 ESOPHAGEAL DYSPHAGIA: Primary | ICD-10-CM

## 2021-11-04 DIAGNOSIS — I50.43 CHF (CONGESTIVE HEART FAILURE), NYHA CLASS I, ACUTE ON CHRONIC, COMBINED (HCC): ICD-10-CM

## 2021-11-04 DIAGNOSIS — N18.5 CKD (CHRONIC KIDNEY DISEASE) STAGE 5, GFR LESS THAN 15 ML/MIN (HCC): ICD-10-CM

## 2021-11-04 DIAGNOSIS — K31.7 GASTRIC POLYP: ICD-10-CM

## 2021-11-04 PROCEDURE — G8484 FLU IMMUNIZE NO ADMIN: HCPCS | Performed by: NURSE PRACTITIONER

## 2021-11-04 PROCEDURE — 99310 SBSQ NF CARE HIGH MDM 45: CPT | Performed by: NURSE PRACTITIONER

## 2021-11-04 PROCEDURE — 1123F ACP DISCUSS/DSCN MKR DOCD: CPT | Performed by: NURSE PRACTITIONER

## 2021-11-04 PROCEDURE — 99356 PR PROLONGED SVC I/P OR OBS SETTING 1ST HOUR: CPT | Performed by: NURSE PRACTITIONER

## 2021-11-08 ENCOUNTER — OFFICE VISIT (OUTPATIENT)
Dept: GERIATRIC MEDICINE | Age: 86
End: 2021-11-08
Payer: MEDICARE

## 2021-11-08 DIAGNOSIS — N18.5 CKD (CHRONIC KIDNEY DISEASE) STAGE 5, GFR LESS THAN 15 ML/MIN (HCC): ICD-10-CM

## 2021-11-08 DIAGNOSIS — I50.43 CHF (CONGESTIVE HEART FAILURE), NYHA CLASS I, ACUTE ON CHRONIC, COMBINED (HCC): Primary | ICD-10-CM

## 2021-11-08 DIAGNOSIS — R53.1 WEAKNESS: ICD-10-CM

## 2021-11-08 DIAGNOSIS — I48.0 PAROXYSMAL ATRIAL FIBRILLATION (HCC): ICD-10-CM

## 2021-11-08 PROCEDURE — G8484 FLU IMMUNIZE NO ADMIN: HCPCS | Performed by: NURSE PRACTITIONER

## 2021-11-08 PROCEDURE — 99316 NF DSCHRG MGMT 30 MIN+: CPT | Performed by: NURSE PRACTITIONER

## 2021-11-12 VITALS
OXYGEN SATURATION: 95 % | DIASTOLIC BLOOD PRESSURE: 76 MMHG | HEART RATE: 72 BPM | TEMPERATURE: 97.2 F | BODY MASS INDEX: 31.75 KG/M2 | RESPIRATION RATE: 18 BRPM | WEIGHT: 215 LBS | SYSTOLIC BLOOD PRESSURE: 122 MMHG

## 2021-11-13 NOTE — PROGRESS NOTES
[address] [facility]. [address] [place]    11/8/2021    Jacqueline Mauricio  is a 81 y.o. in the NF being seen for a f/u of   Chief Complaint   Patient presents with    Other     discharge from rehab       HPI patient was supposed be transferred to a nursing home for skilled therapy closer to his home and she will feel like, but he is still here. Now he states he is going home he is not being transferred anywhere else. The pt is here for rehab after their hospitalization for CHF, he needs dialysis but refuses. Is on high dose diuretics. They are up in   chair  using walker for ambulation in room and with therapy. He does not remember seeing me. Possible delirium versus dementia, no history of dementia or mild cognitive impairment and there are no medications noted for dementia. Patient will need further work-up outpatient after 2 to 3 months for possible dementia once enough time his past to assume that his not delirium versus urea issues and electrolyte encephalopathy from the renal failure. No known drug allergies    Facility medications Tylenol as needed amiodarone 200 mg twice daily   amlodipine 5 mg daily   apixaban 2.5 mg twice daily   atorvastatin 20 mg at at bedtime  Carvedilol 25 mg twice daily   Silver Centrum 50+ men multivitamin 1 daily glimepiride tablet 4 mg every morning hydralazine 100 mg every 12 hours   Januvia 100 mg daily   nitroglycerin tablet sublingually 0.4 mg as needed   torsemide 10 mg tablet 5 tablets daily    They are not eating and drinking OK per nursing. They have had no recent falls with injuries. They are not complaining of any un addressed pain issues. Have had no recent choking or dysphagia issues. PT reports they are not progressing with ambulation as yet. OT reports they are not  progressing with ADLs. Family supportive. Plans to go home after rehab.      Past Medical History:   Diagnosis Date    CAD (coronary artery disease)  Hyperlipidemia      Past Surgical History:   Procedure Laterality Date    CARDIAC SURGERY      UPPER GASTROINTESTINAL ENDOSCOPY N/A 10/29/2021    EGD BIOPSY performed by Carroll [facility]gron MD [facility]     No family history on file. Social History     Socioeconomic History    Marital status:      Spouse name: Not on file    Number of children: Not on file    Years of education: Not on file    Highest education level: Not on file   Occupational History    Not on file   Tobacco Use    Smoking status: Never Smoker    Smokeless tobacco: Never Used   Vaping Use    Vaping Use: Never used   Substance and Sexual Activity    Alcohol use: Not Currently    Drug use: Not Currently    Sexual activity: Not Currently   Other Topics Concern    Not on file   Social History Narrative    Not on file     Social Determinants of Health     Financial Resource Strain:     Difficulty of Paying Living Expenses: Not on file   Food Insecurity:     Worried About Running Out of Food in the Last Year: Not on file    Giancarlo of Food in the Last Year: Not on file   Transportation Needs:     Lack of Transportation (Medical): Not on file    Lack of Transportation (Non-Medical):  Not on file   Physical Activity:     Days of Exercise per Week: Not on file    Minutes of Exercise per Session: Not on file   Stress:     Feeling of Stress : Not on file   Social Connections:     Frequency of Communication with Friends and Family: Not on file    Frequency of Social Gatherings with Friends and Family: Not on file    Attends Worship Services: Not on file    Active Member of Clubs or Organizations: Not on file    Attends Club or Organization Meetings: Not on file    Marital Status: Not on file   Intimate Partner Violence:     Fear of Current or Ex-Partner: Not on file    Emotionally Abused: Not on file    Physically Abused: Not on file    Sexually Abused: Not on file   Housing Stability:     Unable to Pay for Housing in the Last Year: Not on file    Number of Places Lived in the Last Year: Not on file    Unstable Housing in the Last Year: Not on file       Allergies: Patient has no known allergies. NF MEDICATIONS REVIEWED    ROS:   Constitutional: There are no reports of behavioral issues, change in appetite, fever, or increased weakness. No bleeding issues. No head aches, change in vision or hearing. Respiratory: denies SOB, dyspnea, dyspnea on exertion, change in exercise capacity  Cardiovascular: denies CP, lightheadedness, palpitations, PND, orthopnea,  . GI: No N/V/D. : no reports of dysuria, incontinent of urine which is new  Extremities: No reports of pain issues, edema in legs is minimal  Psych:  Agitation and  confusion      Physical exam:   Weight 215.6 pounds respirations 18 blood pressure 122/76 temperature 97.4 pulse 72 O2 saturation 96% with oxygen  Constitutional: Awake and alert sitting up in chair, general weakness  He was ambulating with his walker in his room he is able to do household distances  HEENT: Normocephalic, intact facial symmetry, EOMs intact, sclera non icteric, pupils are equal and round, buccal mucosa pink and moist  Speech clear     NECK: - carotid bruit, euthyroid, no mass visualized  Cardiovascular: Regular rate S1S2 normal, NO S3S4  Respiratory: LCTA, even unlabored respirations, no accessory muscle use noted  GI: abdomen NT, +BS x 4 Q, ND  : incontinent of urine  Extremities: trace b/l ankle edema, PPP  SKELETAL: no redness, or swelling over joints. Limited ROM but spontaneous movement x 4   Psych: pleasantly confused, repetitive , needs re-directed,   SKIN: no gross skin lesions noted on visualized skin, warm and dry    ASSESSMENT:     Diagnosis Orders   1. CHF (congestive heart failure), NYHA class I, acute on chronic, combined ([facility] [address]     2. CKD (chronic kidney disease) stage 5, GFR less than 15 ml/min ([facility])     3. Paroxysmal atrial fibrillation ([address]     4.  Weakness PLAN:  Pt/POA agrees with POC   Discharged with home health care PT OT RN aide same medications follow-up with PCP he needs a BMP said he can have this kidney function evaluated closely monitored  Follow-up with cardiology because of the recent CHF  Antonio Linares his nephrologist  Tolerating his DOAC there is been no bleeding diathesis but with his kidney function being so poor he does not want dialysis he is a very poor candidate for DOAC  Mild cognitive impairment versus delirium versus dementia he is not known to us prior to this admission and he has no diagnosis of dementia    Return f/u PCP one week  needs BMP. Romulo Cosby, APRN-CNP      Romulo Cosby DNP, MSN, RN, GNP-BC, NP-C  Adult/[facility] Nurse Practitioner  [address]  Department of Geriatrics  8:30am-4:30pm   [phone]  After hours Answering service [phone]      Please note this report is partially produced by using speech recognition hardware. It may contain errors related to the system, including grammar, punctuation and spelling as well as words and phrases that may seem inaccurate.   For any questions or concerns feel free to contact me for clarification

## 2021-11-13 NOTE — PROGRESS NOTES
[address] [facility] [place], [place]    11/4/2021    Kameron Vera  is a 81 y.o. in the NF being seen for a f/u of   Chief Complaint   Patient presents with    Follow-Up from Hospital     CHF       HPI being seen for rehab after hospital stay for CHF, CKD, PAF, odynophagia-had gastric polyp removed. Followed by Dr Pravin Melo    11/2 Cr 5  GFR 11     They are eating and drinking at their baseline per nursing. They have had no recent falls with injuries. They are not complaining of any un addressed pain issues. Have had no recent choking or dysphagia issues. NO agitation or unusual mental behavioral issues. Past Medical History:   Diagnosis Date    CAD (coronary artery disease)     Hyperlipidemia      Past Surgical History:   Procedure Laterality Date    CARDIAC SURGERY      UPPER GASTROINTESTINAL ENDOSCOPY N/A 10/29/2021    EGD BIOPSY performed by [facility]     No family history on file. Social History     Socioeconomic History    Marital status:      Spouse name: Not on file    Number of children: Not on file    Years of education: Not on file    Highest education level: Not on file   Occupational History    Not on file   Tobacco Use    Smoking status: Never Smoker    Smokeless tobacco: Never Used   Vaping Use    Vaping Use: Never used   Substance and Sexual Activity    Alcohol use: Not Currently    Drug use: Not Currently    Sexual activity: Not Currently   Other Topics Concern    Not on file   Social History Narrative    Not on file     Social Determinants of Health     Financial Resource Strain:     Difficulty of Paying Living Expenses: Not on file   Food Insecurity:     Worried About Running Out of Food in the Last Year: Not on file    Giancarlo of Food in the Last Year: Not on file   Transportation Needs:     Lack of Transportation (Medical): Not on file    Lack of Transportation (Non-Medical):  Not on file   Physical Activity:     Days of Exercise per Week: Not on file    Minutes of Exercise per Session: Not on file   Stress:     Feeling of Stress : Not on file   Social Connections:     Frequency of Communication with Friends and Family: Not on file    Frequency of Social Gatherings with Friends and Family: Not on file    Attends Rastafarian Services: Not on file    Active Member of [address] [place] [address] or Organizations: Not on file    Attends Club or Organization Meetings: Not on file    Marital Status: Not on file   Intimate Partner Violence:     Fear of Current or Ex-Partner: Not on file    Emotionally Abused: Not on file    Physically Abused: Not on file    Sexually Abused: Not on file   Housing Stability:     Unable to Pay for Housing in the Last Year: Not on file    Number of [place] in the Last Year: Not on file    Unstable Housing in the Last Year: Not on file       Allergies: Patient has no known allergies. NF MEDICATIONS REVIEWED    ROS:   Constitutional: There are no reports of change in appetite, fever, but has  increased weakness. No bleeding issues. Pt upset wants to be transferred to another facility, DON aware of complaints of care over night. Daughter is here and also upset for pt. EYES: no reports of change in vision, double vision or blurred vision  ENT: no changes in hearing, no runny nose, no epistaxis, no sore throat but has hoarse voice from previous polyp  Respiratory: denies SOB, dyspnea, dyspnea on exertion, change in exercise capacity  Cardiovascular: denies CP, lightheadedness, palpitations, PND, orthopnea, or claudication. GI: No N/V/D. No blood noted.   : no reports of dysuria,  incontinent of urine, no blood noted  MS: No reports of pain issues, edema, worse stiffness  NEURO: no noted sz, speech problems, does have cognitive loss, no dizziness, unilateral weakness  Psych: at baseline confusion, no insomnia, no depression  ENDO: no reports of cold/heat intolerance, no hypo/hyperglycemia reports  SKIN: no reports of rash, itch, gross lesions. IMMUNE: see drug allergies noted above, no allergy reactions reported  HEME/LYMPH: does not bruise easily, no excessive bleeding    Physical exam:   /76   Pulse 72   Temp 97.2 °F (36.2 °C)   Resp 18   Wt 215 lb (97.5 kg)   SpO2 95%   BMI 31.75 kg/m²   Constitutional: Awake and alert sitting up in bed, general weakness  HE: normocephalic, intact facial symmetry, EOMs intact, sclera non icteric, pupils are equal and round, ENT: external pinna wnl; nares patent, buccal mucosa pink and moist  Speech clear but hoarse voice. Is hyper oral and repetitive. NECK: - carotid bruit, euthyroid, no mass visualized  Cardiovascular: Regular rate S1S2 normal, NO S3S4  Respiratory: LCTA, even unlabored respirations, no accessory muscle use noted  GI: abdomen NT, +BS x 4 Q, ND  : incontinent of urine  LYMPH:  no cervical lymphadenopathy, no lumps axilla    MS: no redness, or swelling over joints. Limited ROM but spontaneous movement x 4 ; no clubbing noted; no ankle edema, PPP  Psych: pleasantly confused, repetitive , needs re-directed,  normal thought content, Oriented x 2  NEURO: gait not tested, CN 2-12 grossly intact, Brachial DTRs =   SKIN: no gross skin lesions noted on visualized skin, warm and dry    ASSESSMENT:     Diagnosis Orders   1. Esophageal dysphagia     2. CHF (congestive heart failure), NYHA class I, acute on chronic, combined ([facility] [address]     3. PAF (paroxysmal atrial fibrillation) ([facility] [address]     4. Gastric polyp     5. CKD (chronic kidney disease) stage 5, GFR less than 15 ml/min ([facility])         PLAN:  Pt/POA agrees with POC   PT OT ST rehab   F/u with Dr Angela Duran , did start demadex but cr is poor at 5 and GFR 11, follows up with Dr Amy Pina May need dialysis soon but he refused dialysis! BMP and CBC weekly while he is still here, but states he is leaving tomorrow for NF closer to home in [place].    PAF- was started on Amiodarone and DIg. + Low dose Eliquis. Converted to SR.  stopped Dig. Advance Coreg per Dr Duy Bustamante plan.   CABG-x5 - ASA BB Statin. HTN - added Norvasc in hospital  DM- on Insulin, try to keep BG < 200   In hospital had Odynophagia- ENT evaluation negative.  Gastric Polyp found and removed. There were no Esophageal pathology.    HPL - high dose Statin  Adhere to the JNC VIII guidelines for HTN management and the NCEP ATP III guidelines for LDL-C management. Poor prognosis with no dialysis    Return if symptoms worsen or fail to improve. Pertinent POC, medications, labs, have been reviewed, continue same. Encourage fluids and good nutrition. Stress fall prevention strategies. Timed Visit  Time in 11am Time out 1205pm  with the patient, direct face to face patient contact at the bedside, and on the patient's unit for multiple complex medical problem for diagnosis of polyp, DM2, pt states he had stroke? Chart reviewed none noted, PAF, anticoagulation, urine incontinence, review of abnormal lab results, diagnosis possibilities, treatment options, risks and benefits, reviewing NF, hospital charts, labs; discussion with the   Pt and/or POA,  therapists and nursing regarding  rehabilitation  POC and services; Medications as well as documentation and review of POC and any new orders with the patient, nursing staff, and other staff directly related to the patient care. Information given and questions answered. Also plans to leave to another NF in am .   Martha Silva, APRN-CNP      Martha Silva, DNP, MSN, RN, GNP-BC, NP-C  Adult/Gordon Nurse Practitioner  [address] [facility]  Department of Geriatrics  8:30am-4:30pm   [phone]  After hours Answering service [phone]      Please note this report is partially produced by using speech recognition hardware. It may contain errors related to the system, including grammar, punctuation and spelling as well as words and phrases that may seem inaccurate.   For any questions or concerns feel free to contact me for clarification

## 2021-11-20 ENCOUNTER — TELEPHONE (OUTPATIENT)
Dept: OTHER | Facility: CLINIC | Age: 86
End: 2021-11-20

## 2021-11-20 ENCOUNTER — HOSPITAL ENCOUNTER (EMERGENCY)
Age: 86
Discharge: HOME | End: 2021-11-20
Attending: EMERGENCY MEDICINE
Payer: MEDICARE

## 2021-11-20 ENCOUNTER — APPOINTMENT (OUTPATIENT)
Dept: GENERAL RADIOLOGY | Age: 86
End: 2021-11-20
Payer: MEDICARE

## 2021-11-20 VITALS
WEIGHT: 205 LBS | RESPIRATION RATE: 22 BRPM | SYSTOLIC BLOOD PRESSURE: 145 MMHG | HEART RATE: 55 BPM | TEMPERATURE: 97.3 F | DIASTOLIC BLOOD PRESSURE: 88 MMHG | BODY MASS INDEX: 29.35 KG/M2 | OXYGEN SATURATION: 100 % | HEIGHT: 70 IN

## 2021-11-20 DIAGNOSIS — R09.02 HYPOXIA: Primary | ICD-10-CM

## 2021-11-20 DIAGNOSIS — I50.9 CHRONIC CONGESTIVE HEART FAILURE, UNSPECIFIED HEART FAILURE TYPE (HCC): ICD-10-CM

## 2021-11-20 LAB
ALBUMIN SERPL-MCNC: 3.4 G/DL (ref 3.5–4.6)
ALP BLD-CCNC: 112 U/L (ref 35–104)
ALT SERPL-CCNC: 8 U/L (ref 0–41)
ANION GAP SERPL CALCULATED.3IONS-SCNC: 13 MEQ/L (ref 9–15)
APTT: 32.1 SEC (ref 24.4–36.8)
AST SERPL-CCNC: 8 U/L (ref 0–40)
BASOPHILS ABSOLUTE: 0.1 K/UL (ref 0–0.2)
BASOPHILS RELATIVE PERCENT: 1 %
BILIRUB SERPL-MCNC: 0.7 MG/DL (ref 0.2–0.7)
BUN BLDV-MCNC: 58 MG/DL (ref 8–23)
CALCIUM SERPL-MCNC: 8.6 MG/DL (ref 8.5–9.9)
CHLORIDE BLD-SCNC: 101 MEQ/L (ref 95–107)
CO2: 28 MEQ/L (ref 20–31)
CREAT SERPL-MCNC: 6.32 MG/DL (ref 0.7–1.2)
EOSINOPHILS ABSOLUTE: 0.2 K/UL (ref 0–0.7)
EOSINOPHILS RELATIVE PERCENT: 2.8 %
GFR AFRICAN AMERICAN: 10.2
GFR NON-AFRICAN AMERICAN: 8.4
GLOBULIN: 3 G/DL (ref 2.3–3.5)
GLUCOSE BLD-MCNC: 118 MG/DL (ref 70–99)
HCT VFR BLD CALC: 27.6 % (ref 42–52)
HEMOGLOBIN: 9.5 G/DL (ref 14–18)
INR BLD: 1.3
LACTIC ACID: 0.6 MMOL/L (ref 0.5–2.2)
LYMPHOCYTES ABSOLUTE: 0.6 K/UL (ref 1–4.8)
LYMPHOCYTES RELATIVE PERCENT: 10.8 %
MCH RBC QN AUTO: 32.1 PG (ref 27–31.3)
MCHC RBC AUTO-ENTMCNC: 34.2 % (ref 33–37)
MCV RBC AUTO: 93.6 FL (ref 80–100)
MONOCYTES ABSOLUTE: 0.4 K/UL (ref 0.2–0.8)
MONOCYTES RELATIVE PERCENT: 6.5 %
NEUTROPHILS ABSOLUTE: 4.7 K/UL (ref 1.4–6.5)
NEUTROPHILS RELATIVE PERCENT: 78.9 %
PDW BLD-RTO: 14.9 % (ref 11.5–14.5)
PLATELET # BLD: 169 K/UL (ref 130–400)
POTASSIUM SERPL-SCNC: 4.9 MEQ/L (ref 3.4–4.9)
PRO-BNP: NORMAL PG/ML
PROTHROMBIN TIME: 16.2 SEC (ref 12.3–14.9)
RBC # BLD: 2.95 M/UL (ref 4.7–6.1)
SODIUM BLD-SCNC: 142 MEQ/L (ref 135–144)
TOTAL CK: 28 U/L (ref 0–190)
TOTAL PROTEIN: 6.4 G/DL (ref 6.3–8)
TROPONIN: 0.03 NG/ML (ref 0–0.01)
WBC # BLD: 6 K/UL (ref 4.8–10.8)

## 2021-11-20 PROCEDURE — 85730 THROMBOPLASTIN TIME PARTIAL: CPT

## 2021-11-20 PROCEDURE — 85025 COMPLETE CBC W/AUTO DIFF WBC: CPT

## 2021-11-20 PROCEDURE — 85610 PROTHROMBIN TIME: CPT

## 2021-11-20 PROCEDURE — 99284 EMERGENCY DEPT VISIT MOD MDM: CPT

## 2021-11-20 PROCEDURE — 71045 X-RAY EXAM CHEST 1 VIEW: CPT

## 2021-11-20 PROCEDURE — 93005 ELECTROCARDIOGRAM TRACING: CPT | Performed by: EMERGENCY MEDICINE

## 2021-11-20 PROCEDURE — 84484 ASSAY OF TROPONIN QUANT: CPT

## 2021-11-20 PROCEDURE — 83605 ASSAY OF LACTIC ACID: CPT

## 2021-11-20 PROCEDURE — 83880 ASSAY OF NATRIURETIC PEPTIDE: CPT

## 2021-11-20 PROCEDURE — 87040 BLOOD CULTURE FOR BACTERIA: CPT

## 2021-11-20 PROCEDURE — 80053 COMPREHEN METABOLIC PANEL: CPT

## 2021-11-20 PROCEDURE — 96374 THER/PROPH/DIAG INJ IV PUSH: CPT

## 2021-11-20 PROCEDURE — 6360000002 HC RX W HCPCS: Performed by: EMERGENCY MEDICINE

## 2021-11-20 PROCEDURE — 82550 ASSAY OF CK (CPK): CPT

## 2021-11-20 PROCEDURE — 36415 COLL VENOUS BLD VENIPUNCTURE: CPT

## 2021-11-20 RX ORDER — FUROSEMIDE 10 MG/ML
20 INJECTION INTRAMUSCULAR; INTRAVENOUS ONCE
Status: COMPLETED | OUTPATIENT
Start: 2021-11-20 | End: 2021-11-20

## 2021-11-20 RX ADMIN — FUROSEMIDE 20 MG: 10 INJECTION, SOLUTION INTRAVENOUS at 19:02

## 2021-11-20 ASSESSMENT — ENCOUNTER SYMPTOMS
COUGH: 1
VOMITING: 0
SHORTNESS OF BREATH: 1
NAUSEA: 0
ABDOMINAL DISTENTION: 0
ABDOMINAL PAIN: 0

## 2021-11-20 NOTE — ED PROVIDER NOTES
distention, abdominal pain, nausea and vomiting. Genitourinary: Negative. Musculoskeletal: Positive for gait problem. Skin: Negative. Neurological: Positive for weakness. Hematological: Negative. Psychiatric/Behavioral: Negative. Except as noted above the remainder of the review of systems was reviewed and negative.        PAST MEDICAL HISTORY     Past Medical History:   Diagnosis Date    CAD (coronary artery disease)     Hyperlipidemia          SURGICALHISTORY       Past Surgical History:   Procedure Laterality Date    CARDIAC SURGERY      UPPER GASTROINTESTINAL ENDOSCOPY N/A 10/29/2021    EGD BIOPSY performed by Kiko Kimble MD at [address] [place] [address]       Current Discharge Medication List      CONTINUE these medications which have NOT CHANGED    Details   amiodarone (CORDARONE) 200 MG tablet Take 1 tablet by mouth 2 times daily  Qty: 30 tablet, Refills: 3      amLODIPine (NORVASC) 5 MG tablet Take 1 tablet by mouth daily  Qty: 30 tablet, Refills: 3      apixaban (ELIQUIS) 2.5 MG TABS tablet Take 1 tablet by mouth 2 times daily  Qty: 60 tablet, Refills: 3      atorvastatin (LIPITOR) 20 MG tablet Take 1 tablet by mouth nightly  Qty: 30 tablet, Refills: 3      carvedilol (COREG) 25 MG tablet Take 1 tablet by mouth 2 times daily  Qty: 60 tablet, Refills: 3      torsemide (DEMADEX) 10 MG tablet Take 5 tablets by mouth daily  Qty: 30 tablet, Refills: 3      hydrALAZINE (APRESOLINE) 100 MG tablet Take 1 tablet by mouth every 12 hours  Qty: 60 tablet, Refills: 3      glimepiride (AMARYL) 4 MG tablet Take 4 mg by mouth every morning (before breakfast) Indications: Diabetes       SITagliptin (JANUVIA) 100 MG tablet Take 100 mg by mouth daily Indications: Diabetes       Multiple Vitamins-Minerals (CENTRUM SILVER ADULT 50+) TABS Take 1 tablet by mouth daily Indications: Nutritional Support       nitroGLYCERIN (NITROSTAT) 0.4 MG SL tablet Place 0.4 mg under the tongue every 5 minutes as needed for Chest pain Indications: Chest Pain up to max of 3 total doses. If no relief after 1 dose, call 911. ALLERGIES     Patient has no known allergies. FAMILY HISTORY     No family history on file. SOCIAL HISTORY       Social History     Socioeconomic History    Marital status:      Spouse name: Not on file    Number of children: Not on file    Years of education: Not on file    Highest education level: Not on file   Occupational History    Not on file   Tobacco Use    Smoking status: Never Smoker    Smokeless tobacco: Never Used   Vaping Use    Vaping Use: Never used   Substance and Sexual Activity    Alcohol use: Not Currently    Drug use: Not Currently    Sexual activity: Not Currently   Other Topics Concern    Not on file   Social History Narrative    Not on file     Social Determinants of Health     Financial Resource Strain:     Difficulty of Paying Living Expenses: Not on file   Food Insecurity:     Worried About Running Out of Food in the Last Year: Not on file    Giancarlo of Food in the Last Year: Not on file   Transportation Needs:     Lack of Transportation (Medical): Not on file    Lack of Transportation (Non-Medical):  Not on file   Physical Activity:     Days of Exercise per Week: Not on file    Minutes of Exercise per Session: Not on file   Stress:     Feeling of Stress : Not on file   Social Connections:     Frequency of Communication with Friends and Family: Not on file    Frequency of Social Gatherings with Friends and Family: Not on file    Attends Jainism Services: Not on file    Active Member of Clubs or Organizations: Not on file    Attends Club or Organization Meetings: Not on file    Marital Status: Not on file   Intimate Partner Violence:     Fear of Current or Ex-Partner: Not on file    Emotionally Abused: Not on file    Physically Abused: Not on file    Sexually Abused: Not on file   Housing Stability:     Unable to Pay for Housing in the Last Year: Not on file    Number of Places Lived in the Last Year: Not on file    Unstable Housing in the Last Year: Not on file       SCREENINGS             PHYSICAL EXAM    (up to 7 for level 4, 8 or more for level 5)     ED Triage Vitals   BP Temp Temp Source Pulse Resp SpO2 Height Weight   11/20/21 1626 11/20/21 1626 11/20/21 1626 11/20/21 1626 11/20/21 1626 11/20/21 0146 11/20/21 1626 11/20/21 1626   (!) 167/78 97.3 °F (36.3 °C) Oral 55 18 92 % 5' 10\" (1.778 m) 205 lb (93 kg)       Physical Exam  Vitals and nursing note reviewed. Constitutional:       General: He is not in acute distress. Appearance: Normal appearance. He is well-developed. He is not ill-appearing. HENT:      Head: Normocephalic and atraumatic. Eyes:      Conjunctiva/sclera: Conjunctivae normal.      Pupils: Pupils are equal, round, and reactive to light. Neck:      Trachea: Trachea normal.   Cardiovascular:      Rate and Rhythm: Normal rate and regular rhythm. Pulses: Normal pulses. Heart sounds: Normal heart sounds. No friction rub. No gallop. Pulmonary:      Effort: Pulmonary effort is normal. No tachypnea, bradypnea, accessory muscle usage or respiratory distress. Breath sounds: Decreased breath sounds and rales present. Chest:      Chest wall: No mass or deformity. Abdominal:      General: Bowel sounds are normal.      Palpations: Abdomen is soft. There is no hepatomegaly, splenomegaly or mass. Tenderness: There is no abdominal tenderness. Musculoskeletal:         General: Normal range of motion. Cervical back: Full passive range of motion without pain, normal range of motion and neck supple. Right lower leg: Edema present. Left lower leg: Edema present. Skin:     General: Skin is warm and dry. Capillary Refill: Capillary refill takes less than 2 seconds. Coloration: Skin is not cyanotic or pale.    Neurological:      General: No focal deficit present. Mental Status: He is alert and oriented to person, place, and time. Cranial Nerves: No cranial nerve deficit. Motor: Weakness present. Psychiatric:         Mood and Affect: Mood normal. Mood is not anxious. Speech: Speech normal.         Behavior: Behavior normal. Behavior is not agitated. Thought Content: Thought content normal.         Judgment: Judgment normal.         DIAGNOSTIC RESULTS     EKG: All EKG's are interpreted by the Emergency Department Physician who either signs or Co-signsthis chart in the absence of a cardiologist.    KG demonstrates sinus bradycardia. Rate is 53. QRS morphology is consistent with previous recent EKGs. The bradycardia is new. Rate is 53. There is no evidence of acute ST segment elevation. This is an abnormal overall EKG.     RADIOLOGY:   Non-plain filmimages such as CT, Ultrasound and MRI are read by the radiologist. Plain radiographic images are visualized and preliminarily interpreted by the emergency physician with the below findings:    CXR- neg acute    Interpretation per the Radiologist below, if available at the time ofthis note:    XR CHEST PORTABLE    (Results Pending)         ED BEDSIDE ULTRASOUND:   Performed by ED Physician - none    LABS:  Labs Reviewed   COMPREHENSIVE METABOLIC PANEL - Abnormal; Notable for the following components:       Result Value    Glucose 118 (*)     BUN 58 (*)     CREATININE 6.32 (*)     GFR Non- 8.4 (*)     GFR  10.2 (*)     Albumin 3.4 (*)     Alkaline Phosphatase 112 (*)     All other components within normal limits    Narrative:     CALL  [facility]  [facility] tel. [phone],  Chemistry results called to and read back by YAMILET SPANN, 11/20/2021  19:02, by ZACHERY   TROPONIN - Abnormal; Notable for the following components:    Troponin 0.033 (*)     All other components within normal limits    Narrative:     Senthil Denny tel. [phone],  Chemistry results called to and read back by YAMILET SPANN, 11/20/2021  19:02, by Richi Lu - Abnormal; Notable for the following components:    Protime 16.2 (*)     All other components within normal limits   CBC WITH AUTO DIFFERENTIAL - Abnormal; Notable for the following components:    RBC 2.95 (*)     Hemoglobin 9.5 (*)     Hematocrit 27.6 (*)     MCH 32.1 (*)     RDW 14.9 (*)     Lymphocytes Absolute 0.6 (*)     All other components within normal limits   CULTURE, BLOOD 1   CULTURE, BLOOD 2   LACTIC ACID, PLASMA   APTT   CK    Narrative:     Valdo De Leon tel. [phone],  Chemistry results called to and read back by Paxfire, 11/20/2021  19:02, by Giancarlo Camacho    Narrative:     Valdo De Leon tel. [phone],  Chemistry results called to and read back by Paxfire, 11/20/2021  19:02, by Kerry Miranda       All other labs were within normal range or not returned as of this dictation. EMERGENCY DEPARTMENT COURSE and DIFFERENTIAL DIAGNOSIS/MDM:   Vitals:    Vitals:    11/20/21 0146 11/20/21 1626 11/20/21 1914   BP:  (!) 167/78 (!) 145/88   Pulse:  55    Resp:  18 22   Temp:  97.3 °F (36.3 °C)    TempSrc:  Oral    SpO2: 92% (!) 88% 100%   Weight:  205 lb (93 kg)    Height:  5' 10\" (1.778 m)        MDM  Number of Diagnoses or Management Options  Chronic congestive heart failure, unspecified heart failure type ([facility] [address]  Hypoxia  Diagnosis management comments: Hx of CHF and requiring O2, this was not sent home with him on discharge from [facility]. On 2L O2, will send home with O2. Patient is a [facility] and refusing admission. He does have some worsening renal failure. Updated on this, patient does not want dialysis. Has had this discussion previously. Patient will be discharged home in faircondition. Patient has been hemodynamically stable throughout ED course and is appropriate for outpatient follow up.   Patient should follow up with PCP in 2-3 days or return to ED immediately for any new or worsening symptoms. Patient is well appearing on discharge and agreeable with plan of care. CRITICAL CARE TIME   Total Critical Care time was 0 minutes, excluding separately reportableprocedures. There was a high probability of clinicallysignificant/life threatening deterioration in the patient's condition which required my urgent intervention. CONSULTS:  None    PROCEDURES:  Unless otherwise noted below, none     Procedures    FINAL IMPRESSION      1. Hypoxia    2. Chronic congestive heart failure, unspecified heart failure type [facility])        DISPOSITION/PLAN   DISPOSITION Decision To Discharge 11/20/2021 07:41:11 PM      PATIENT REFERRED TO:  Vu Rawls DO  [address] [address]  [phone]    Schedule an appointment as soon as possible for a visit in 2 days        DISCHARGE MEDICATIONS:  Current Discharge Medication List             (Please note that portions of this note were completed with a voice recognitionprogram.  Efforts were made to edit the dictations but occasionally words are mis-transcribed.)    Babs Mac MD (electronically signed)  Attending Emergency Physician         Babs Mac MD  11/20/21 194

## 2021-11-22 ENCOUNTER — TELEPHONE (OUTPATIENT)
Dept: OTHER | Facility: CLINIC | Age: 86
End: 2021-11-22

## 2021-11-23 LAB
EKG ATRIAL RATE: 53 BPM
EKG P AXIS: 28 DEGREES
EKG P-R INTERVAL: 178 MS
EKG Q-T INTERVAL: 498 MS
EKG QRS DURATION: 112 MS
EKG QTC CALCULATION (BAZETT): 467 MS
EKG R AXIS: 6 DEGREES
EKG T AXIS: 257 DEGREES
EKG VENTRICULAR RATE: 53 BPM

## 2021-11-23 PROCEDURE — 93010 ELECTROCARDIOGRAM REPORT: CPT | Performed by: INTERNAL MEDICINE

## 2021-11-25 ENCOUNTER — HOSPITAL ENCOUNTER (INPATIENT)
Age: 86
LOS: 3 days | Discharge: HOSPICE | DRG: 291 | End: 2021-11-28
Attending: EMERGENCY MEDICINE | Admitting: INTERNAL MEDICINE
Payer: MEDICARE

## 2021-11-25 ENCOUNTER — APPOINTMENT (OUTPATIENT)
Dept: CT IMAGING | Age: 86
DRG: 291 | End: 2021-11-25
Payer: MEDICARE

## 2021-11-25 ENCOUNTER — TELEPHONE (OUTPATIENT)
Dept: OTHER | Facility: CLINIC | Age: 86
End: 2021-11-25

## 2021-11-25 ENCOUNTER — APPOINTMENT (OUTPATIENT)
Dept: GENERAL RADIOLOGY | Age: 86
DRG: 291 | End: 2021-11-25
Payer: MEDICARE

## 2021-11-25 DIAGNOSIS — N17.9 AKI (ACUTE KIDNEY INJURY) (HCC): Primary | ICD-10-CM

## 2021-11-25 DIAGNOSIS — E87.5 HYPERKALEMIA: ICD-10-CM

## 2021-11-25 DIAGNOSIS — E16.2 HYPOGLYCEMIA: ICD-10-CM

## 2021-11-25 DIAGNOSIS — R33.9 URINARY RETENTION: ICD-10-CM

## 2021-11-25 LAB
ALBUMIN SERPL-MCNC: 3.2 G/DL (ref 3.5–4.6)
ALP BLD-CCNC: 106 U/L (ref 35–104)
ALT SERPL-CCNC: 6 U/L (ref 0–41)
ANION GAP SERPL CALCULATED.3IONS-SCNC: 11 MEQ/L (ref 9–15)
AST SERPL-CCNC: 8 U/L (ref 0–40)
BACTERIA: NEGATIVE /HPF
BASOPHILS ABSOLUTE: 0 K/UL (ref 0–0.2)
BASOPHILS RELATIVE PERCENT: 0.3 %
BILIRUB SERPL-MCNC: 0.8 MG/DL (ref 0.2–0.7)
BILIRUBIN URINE: NEGATIVE
BLOOD CULTURE, ROUTINE: NORMAL
BLOOD, URINE: NEGATIVE
BUN BLDV-MCNC: 60 MG/DL (ref 8–23)
CALCIUM SERPL-MCNC: 8.5 MG/DL (ref 8.5–9.9)
CHLORIDE BLD-SCNC: 97 MEQ/L (ref 95–107)
CLARITY: CLEAR
CO2: 29 MEQ/L (ref 20–31)
COLOR: YELLOW
CREAT SERPL-MCNC: 7.33 MG/DL (ref 0.7–1.2)
CULTURE, BLOOD 2: NORMAL
EOSINOPHILS ABSOLUTE: 0.1 K/UL (ref 0–0.7)
EOSINOPHILS RELATIVE PERCENT: 2 %
EPITHELIAL CELLS, UA: NORMAL /HPF (ref 0–5)
FERRITIN: 71.9 NG/ML (ref 30–400)
FOLATE: 18.8 NG/ML (ref 7.3–26.1)
GFR AFRICAN AMERICAN: 8.6
GFR NON-AFRICAN AMERICAN: 7.1
GLOBULIN: 3.5 G/DL (ref 2.3–3.5)
GLUCOSE BLD-MCNC: 105 MG/DL (ref 60–115)
GLUCOSE BLD-MCNC: 115 MG/DL (ref 60–115)
GLUCOSE BLD-MCNC: 118 MG/DL (ref 60–115)
GLUCOSE BLD-MCNC: 124 MG/DL (ref 70–99)
GLUCOSE BLD-MCNC: 175 MG/DL (ref 60–115)
GLUCOSE BLD-MCNC: 217 MG/DL (ref 60–115)
GLUCOSE BLD-MCNC: 90 MG/DL (ref 60–115)
GLUCOSE BLD-MCNC: 98 MG/DL (ref 60–115)
GLUCOSE URINE: NEGATIVE MG/DL
HCT VFR BLD CALC: 28 % (ref 42–52)
HEMOGLOBIN: 9.5 G/DL (ref 14–18)
HYALINE CASTS: NORMAL /HPF (ref 0–5)
IRON SATURATION: 26 % (ref 11–46)
IRON: 58 UG/DL (ref 59–158)
KETONES, URINE: NEGATIVE MG/DL
LACTIC ACID: 0.7 MMOL/L (ref 0.5–2.2)
LEUKOCYTE ESTERASE, URINE: NEGATIVE
LYMPHOCYTES ABSOLUTE: 0.6 K/UL (ref 1–4.8)
LYMPHOCYTES RELATIVE PERCENT: 12.4 %
MAGNESIUM: 2.5 MG/DL (ref 1.7–2.4)
MCH RBC QN AUTO: 31.9 PG (ref 27–31.3)
MCHC RBC AUTO-ENTMCNC: 33.9 % (ref 33–37)
MCV RBC AUTO: 93.9 FL (ref 80–100)
MONOCYTES ABSOLUTE: 0.2 K/UL (ref 0.2–0.8)
MONOCYTES RELATIVE PERCENT: 4.9 %
NEUTROPHILS ABSOLUTE: 3.8 K/UL (ref 1.4–6.5)
NEUTROPHILS RELATIVE PERCENT: 80.4 %
NITRITE, URINE: NEGATIVE
PDW BLD-RTO: 15 % (ref 11.5–14.5)
PERFORMED ON: ABNORMAL
PERFORMED ON: NORMAL
PH UA: 5 (ref 5–9)
PLATELET # BLD: 137 K/UL (ref 130–400)
POTASSIUM SERPL-SCNC: 5.1 MEQ/L (ref 3.4–4.9)
POTASSIUM SERPL-SCNC: 5.6 MEQ/L (ref 3.4–4.9)
PROTEIN UA: >=300 MG/DL
RBC # BLD: 2.98 M/UL (ref 4.7–6.1)
RBC UA: NORMAL /HPF (ref 0–2)
SODIUM BLD-SCNC: 137 MEQ/L (ref 135–144)
SPECIFIC GRAVITY UA: 1.01 (ref 1–1.03)
TOTAL IRON BINDING CAPACITY: 221 UG/DL (ref 178–450)
TOTAL PROTEIN: 6.7 G/DL (ref 6.3–8)
TROPONIN: 0.02 NG/ML (ref 0–0.01)
UROBILINOGEN, URINE: 0.2 E.U./DL
VITAMIN B-12: 465 PG/ML (ref 232–1245)
WBC # BLD: 4.7 K/UL (ref 4.8–10.8)
WBC UA: NORMAL /HPF (ref 0–5)

## 2021-11-25 PROCEDURE — 96374 THER/PROPH/DIAG INJ IV PUSH: CPT

## 2021-11-25 PROCEDURE — 6370000000 HC RX 637 (ALT 250 FOR IP): Performed by: INTERNAL MEDICINE

## 2021-11-25 PROCEDURE — 2500000003 HC RX 250 WO HCPCS: Performed by: EMERGENCY MEDICINE

## 2021-11-25 PROCEDURE — 6360000002 HC RX W HCPCS: Performed by: EMERGENCY MEDICINE

## 2021-11-25 PROCEDURE — 84132 ASSAY OF SERUM POTASSIUM: CPT

## 2021-11-25 PROCEDURE — 87040 BLOOD CULTURE FOR BACTERIA: CPT

## 2021-11-25 PROCEDURE — 83540 ASSAY OF IRON: CPT

## 2021-11-25 PROCEDURE — 82607 VITAMIN B-12: CPT

## 2021-11-25 PROCEDURE — 6360000002 HC RX W HCPCS: Performed by: INTERNAL MEDICINE

## 2021-11-25 PROCEDURE — 2580000003 HC RX 258: Performed by: INTERNAL MEDICINE

## 2021-11-25 PROCEDURE — 6370000000 HC RX 637 (ALT 250 FOR IP): Performed by: EMERGENCY MEDICINE

## 2021-11-25 PROCEDURE — 81001 URINALYSIS AUTO W/SCOPE: CPT

## 2021-11-25 PROCEDURE — 99285 EMERGENCY DEPT VISIT HI MDM: CPT

## 2021-11-25 PROCEDURE — 82728 ASSAY OF FERRITIN: CPT

## 2021-11-25 PROCEDURE — 93005 ELECTROCARDIOGRAM TRACING: CPT | Performed by: EMERGENCY MEDICINE

## 2021-11-25 PROCEDURE — 70450 CT HEAD/BRAIN W/O DYE: CPT

## 2021-11-25 PROCEDURE — 80053 COMPREHEN METABOLIC PANEL: CPT

## 2021-11-25 PROCEDURE — 36415 COLL VENOUS BLD VENIPUNCTURE: CPT

## 2021-11-25 PROCEDURE — 84484 ASSAY OF TROPONIN QUANT: CPT

## 2021-11-25 PROCEDURE — 82746 ASSAY OF FOLIC ACID SERUM: CPT

## 2021-11-25 PROCEDURE — 83550 IRON BINDING TEST: CPT

## 2021-11-25 PROCEDURE — 85025 COMPLETE CBC W/AUTO DIFF WBC: CPT

## 2021-11-25 PROCEDURE — 2580000003 HC RX 258: Performed by: EMERGENCY MEDICINE

## 2021-11-25 PROCEDURE — 83605 ASSAY OF LACTIC ACID: CPT

## 2021-11-25 PROCEDURE — 1210000000 HC MED SURG R&B

## 2021-11-25 PROCEDURE — 71045 X-RAY EXAM CHEST 1 VIEW: CPT

## 2021-11-25 PROCEDURE — 83735 ASSAY OF MAGNESIUM: CPT

## 2021-11-25 RX ORDER — SODIUM CHLORIDE 9 MG/ML
25 INJECTION, SOLUTION INTRAVENOUS PRN
Status: DISCONTINUED | OUTPATIENT
Start: 2021-11-25 | End: 2021-11-28 | Stop reason: HOSPADM

## 2021-11-25 RX ORDER — HEPARIN SODIUM 5000 [USP'U]/ML
5000 INJECTION, SOLUTION INTRAVENOUS; SUBCUTANEOUS EVERY 8 HOURS SCHEDULED
Status: DISCONTINUED | OUTPATIENT
Start: 2021-11-25 | End: 2021-11-25

## 2021-11-25 RX ORDER — ONDANSETRON 2 MG/ML
4 INJECTION INTRAMUSCULAR; INTRAVENOUS EVERY 6 HOURS PRN
Status: DISCONTINUED | OUTPATIENT
Start: 2021-11-25 | End: 2021-11-28 | Stop reason: HOSPADM

## 2021-11-25 RX ORDER — ATORVASTATIN CALCIUM 20 MG/1
20 TABLET, FILM COATED ORAL NIGHTLY
Status: DISCONTINUED | OUTPATIENT
Start: 2021-11-25 | End: 2021-11-27

## 2021-11-25 RX ORDER — 0.9 % SODIUM CHLORIDE 0.9 %
1000 INTRAVENOUS SOLUTION INTRAVENOUS ONCE
Status: DISCONTINUED | OUTPATIENT
Start: 2021-11-25 | End: 2021-11-25

## 2021-11-25 RX ORDER — DEXTROSE MONOHYDRATE 25 G/50ML
25 INJECTION, SOLUTION INTRAVENOUS ONCE
Status: COMPLETED | OUTPATIENT
Start: 2021-11-25 | End: 2021-11-25

## 2021-11-25 RX ORDER — FUROSEMIDE 10 MG/ML
40 INJECTION INTRAMUSCULAR; INTRAVENOUS DAILY
Status: DISCONTINUED | OUTPATIENT
Start: 2021-11-25 | End: 2021-11-28

## 2021-11-25 RX ORDER — SODIUM CHLORIDE 0.9 % (FLUSH) 0.9 %
10 SYRINGE (ML) INJECTION EVERY 12 HOURS SCHEDULED
Status: DISCONTINUED | OUTPATIENT
Start: 2021-11-25 | End: 2021-11-28 | Stop reason: HOSPADM

## 2021-11-25 RX ORDER — FUROSEMIDE 10 MG/ML
40 INJECTION INTRAMUSCULAR; INTRAVENOUS 2 TIMES DAILY
Status: DISCONTINUED | OUTPATIENT
Start: 2021-11-25 | End: 2021-11-25

## 2021-11-25 RX ORDER — PROMETHAZINE HYDROCHLORIDE 12.5 MG/1
12.5 TABLET ORAL EVERY 6 HOURS PRN
Status: DISCONTINUED | OUTPATIENT
Start: 2021-11-25 | End: 2021-11-28 | Stop reason: HOSPADM

## 2021-11-25 RX ORDER — SODIUM CHLORIDE 9 MG/ML
INJECTION, SOLUTION INTRAVENOUS CONTINUOUS
Status: DISPENSED | OUTPATIENT
Start: 2021-11-25 | End: 2021-11-26

## 2021-11-25 RX ORDER — HYDRALAZINE HYDROCHLORIDE 50 MG/1
50 TABLET, FILM COATED ORAL EVERY 8 HOURS SCHEDULED
Status: DISCONTINUED | OUTPATIENT
Start: 2021-11-25 | End: 2021-11-28 | Stop reason: HOSPADM

## 2021-11-25 RX ORDER — 0.9 % SODIUM CHLORIDE 0.9 %
250 INTRAVENOUS SOLUTION INTRAVENOUS ONCE
Status: COMPLETED | OUTPATIENT
Start: 2021-11-25 | End: 2021-11-25

## 2021-11-25 RX ORDER — DEXTROSE MONOHYDRATE 100 MG/ML
INJECTION, SOLUTION INTRAVENOUS CONTINUOUS
Status: DISPENSED | OUTPATIENT
Start: 2021-11-25 | End: 2021-11-26

## 2021-11-25 RX ORDER — ACETAMINOPHEN 325 MG/1
650 TABLET ORAL EVERY 6 HOURS PRN
Status: DISCONTINUED | OUTPATIENT
Start: 2021-11-25 | End: 2021-11-28 | Stop reason: HOSPADM

## 2021-11-25 RX ORDER — NICOTINE POLACRILEX 4 MG
15 LOZENGE BUCCAL PRN
Status: DISCONTINUED | OUTPATIENT
Start: 2021-11-25 | End: 2021-11-28 | Stop reason: HOSPADM

## 2021-11-25 RX ORDER — AMIODARONE HYDROCHLORIDE 200 MG/1
200 TABLET ORAL DAILY
Status: DISCONTINUED | OUTPATIENT
Start: 2021-11-25 | End: 2021-11-28 | Stop reason: HOSPADM

## 2021-11-25 RX ORDER — AMLODIPINE BESYLATE 5 MG/1
5 TABLET ORAL DAILY
Status: DISCONTINUED | OUTPATIENT
Start: 2021-11-26 | End: 2021-11-28 | Stop reason: HOSPADM

## 2021-11-25 RX ORDER — AMLODIPINE BESYLATE 5 MG/1
2.5 TABLET ORAL DAILY
Status: DISCONTINUED | OUTPATIENT
Start: 2021-11-25 | End: 2021-11-25

## 2021-11-25 RX ORDER — SODIUM CHLORIDE 0.9 % (FLUSH) 0.9 %
10 SYRINGE (ML) INJECTION PRN
Status: DISCONTINUED | OUTPATIENT
Start: 2021-11-25 | End: 2021-11-28 | Stop reason: HOSPADM

## 2021-11-25 RX ORDER — POLYETHYLENE GLYCOL 3350 17 G/17G
17 POWDER, FOR SOLUTION ORAL DAILY PRN
Status: DISCONTINUED | OUTPATIENT
Start: 2021-11-25 | End: 2021-11-28 | Stop reason: HOSPADM

## 2021-11-25 RX ORDER — DEXTROSE MONOHYDRATE 50 MG/ML
100 INJECTION, SOLUTION INTRAVENOUS PRN
Status: DISCONTINUED | OUTPATIENT
Start: 2021-11-25 | End: 2021-11-28 | Stop reason: HOSPADM

## 2021-11-25 RX ORDER — DEXTROSE MONOHYDRATE 25 G/50ML
12.5 INJECTION, SOLUTION INTRAVENOUS PRN
Status: DISCONTINUED | OUTPATIENT
Start: 2021-11-25 | End: 2021-11-28 | Stop reason: HOSPADM

## 2021-11-25 RX ORDER — LORAZEPAM 2 MG/ML
0.5 INJECTION INTRAMUSCULAR ONCE
Status: COMPLETED | OUTPATIENT
Start: 2021-11-25 | End: 2021-11-25

## 2021-11-25 RX ORDER — SODIUM POLYSTYRENE SULFONATE 15 G/60ML
15 SUSPENSION ORAL; RECTAL ONCE
Status: COMPLETED | OUTPATIENT
Start: 2021-11-25 | End: 2021-11-25

## 2021-11-25 RX ORDER — ACETAMINOPHEN 650 MG/1
650 SUPPOSITORY RECTAL EVERY 6 HOURS PRN
Status: DISCONTINUED | OUTPATIENT
Start: 2021-11-25 | End: 2021-11-28 | Stop reason: HOSPADM

## 2021-11-25 RX ADMIN — DEXTROSE MONOHYDRATE 25 G: 25 INJECTION, SOLUTION INTRAVENOUS at 10:11

## 2021-11-25 RX ADMIN — HYDRALAZINE HYDROCHLORIDE 50 MG: 50 TABLET, FILM COATED ORAL at 15:19

## 2021-11-25 RX ADMIN — SODIUM CHLORIDE 250 ML: 9 INJECTION, SOLUTION INTRAVENOUS at 08:25

## 2021-11-25 RX ADMIN — ATORVASTATIN CALCIUM 20 MG: 20 TABLET, FILM COATED ORAL at 21:26

## 2021-11-25 RX ADMIN — FUROSEMIDE 40 MG: 10 INJECTION, SOLUTION INTRAMUSCULAR; INTRAVENOUS at 17:36

## 2021-11-25 RX ADMIN — SODIUM CHLORIDE: 9 INJECTION, SOLUTION INTRAVENOUS at 17:35

## 2021-11-25 RX ADMIN — AMLODIPINE BESYLATE 2.5 MG: 5 TABLET ORAL at 15:19

## 2021-11-25 RX ADMIN — HYDRALAZINE HYDROCHLORIDE 50 MG: 50 TABLET, FILM COATED ORAL at 21:25

## 2021-11-25 RX ADMIN — DEXTROSE MONOHYDRATE 25 G: 25 INJECTION, SOLUTION INTRAVENOUS at 10:10

## 2021-11-25 RX ADMIN — APIXABAN 2.5 MG: 2.5 TABLET, FILM COATED ORAL at 15:19

## 2021-11-25 RX ADMIN — SODIUM POLYSTYRENE SULFONATE 15 G: 15 SUSPENSION ORAL; RECTAL at 10:10

## 2021-11-25 RX ADMIN — LORAZEPAM 0.5 MG: 2 INJECTION INTRAMUSCULAR; INTRAVENOUS at 07:55

## 2021-11-25 RX ADMIN — DEXTROSE MONOHYDRATE: 100 INJECTION, SOLUTION INTRAVENOUS at 15:18

## 2021-11-25 RX ADMIN — INSULIN HUMAN 10 UNITS: 100 INJECTION, SOLUTION PARENTERAL at 10:10

## 2021-11-25 RX ADMIN — APIXABAN 2.5 MG: 2.5 TABLET, FILM COATED ORAL at 21:26

## 2021-11-25 RX ADMIN — Medication 10 ML: at 21:26

## 2021-11-25 ASSESSMENT — ENCOUNTER SYMPTOMS
SORE THROAT: 0
SHORTNESS OF BREATH: 0
BACK PAIN: 0
NAUSEA: 0
VOMITING: 0
COUGH: 0
ABDOMINAL PAIN: 0
DIARRHEA: 0

## 2021-11-25 NOTE — TELEPHONE ENCOUNTER
Patient's mother does not have permission for ambulatory verbal communication, patient is currently incarcerated, previously we have been told we are unable to tell anyone anything regarding treatment except the representative from the facility at which patient resides. Unable to call mother and provide update.     Please advise.    Writer contacted Dr Ijeoma Benítez via text ,ED provider to inform of 30 day readmission risk.

## 2021-11-25 NOTE — ED NOTES
Oh catheter was placed as ordered, approximately 700 mL's of cloudy/shawna urine came out, pt tolerated the procedure well.      Xavier Galicia RN  11/25/21 7798

## 2021-11-25 NOTE — CONSULTS
3    atorvastatin (LIPITOR) 20 MG tablet Take 1 tablet by mouth nightly (Patient taking differently: Take 20 mg by mouth nightly Indications: High Amount of Fats in the Blood ) 30 tablet 3    carvedilol (COREG) 25 MG tablet Take 1 tablet by mouth 2 times daily (Patient taking differently: Take 25 mg by mouth 2 times daily Indications: High Blood Pressure Disorder ) 60 tablet 3    torsemide (DEMADEX) 10 MG tablet Take 5 tablets by mouth daily (Patient taking differently: Take 50 mg by mouth daily Indications: Treatment with Diuretic Therapy Give 5 tabs daily) 30 tablet 3    hydrALAZINE (APRESOLINE) 100 MG tablet Take 1 tablet by mouth every 12 hours (Patient taking differently: Take 100 mg by mouth every 12 hours Indications: High Blood Pressure Disorder ) 60 tablet 3    glimepiride (AMARYL) 4 MG tablet Take 4 mg by mouth every morning (before breakfast) Indications: Diabetes       SITagliptin (JANUVIA) 100 MG tablet Take 100 mg by mouth daily Indications: Diabetes       Multiple Vitamins-Minerals (CENTRUM SILVER ADULT 50+) TABS Take 1 tablet by mouth daily Indications: Nutritional Support       nitroGLYCERIN (NITROSTAT) 0.4 MG SL tablet Place 0.4 mg under the tongue every 5 minutes as needed for Chest pain Indications: Chest Pain up to max of 3 total doses. If no relief after 1 dose, call 911. Allergies:  Patient has no known allergies.     Social History:    Social History     Socioeconomic History    Marital status:      Spouse name: Not on file    Number of children: Not on file    Years of education: Not on file    Highest education level: Not on file   Occupational History    Not on file   Tobacco Use    Smoking status: Never Smoker    Smokeless tobacco: Never Used   Vaping Use    Vaping Use: Never used   Substance and Sexual Activity    Alcohol use: Not Currently    Drug use: Not Currently    Sexual activity: Not Currently   Other Topics Concern    Not on file   Social History Narrative    Not on file     Social Determinants of Health     Financial Resource Strain:     Difficulty of Paying Living Expenses: Not on file   Food Insecurity:     Worried About Running Out of Food in the Last Year: Not on file    Giancarlo of Food in the Last Year: Not on file   Transportation Needs:     Lack of Transportation (Medical): Not on file    Lack of Transportation (Non-Medical): Not on file   Physical Activity:     Days of Exercise per Week: Not on file    Minutes of Exercise per Session: Not on file   Stress:     Feeling of Stress : Not on file   Social Connections:     Frequency of Communication with Friends and Family: Not on file    Frequency of Social Gatherings with Friends and Family: Not on file    Attends [employer] Services: Not on file    Active Member of [address] [place] [address] [employer] or Organizations: Not on file    Attends Club or Organization Meetings: Not on file    Marital Status: Not on file   Intimate Partner Violence:     Fear of Current or Ex-Partner: Not on file    Emotionally Abused: Not on file    Physically Abused: Not on file    Sexually Abused: Not on file   Housing Stability:     Unable to Pay for Housing in the Last Year: Not on file    Number of [place] in the Last Year: Not on file    Unstable Housing in the Last Year: Not on file       Family History:   History reviewed. No pertinent family history. Review of Systems:   Patient denies any fever or chills no productive or nonproductive cough no nausea emesis or diarrhea no dysuria no near syncope or syncope and no any other organ system related symptoms    Physical exam:   Constitutional:  VITALS:  BP (!) 154/95   Pulse 52   Temp 97.2 °F (36.2 °C) (Oral)   Resp 14   Ht 5' 9.5\" (1.765 m)   Wt 195 lb (88.5 kg)   SpO2 92%   BMI 28.38 kg/m²   Gen: alert, awake, nad  Skin: no rash, turgor wnl  Heent:  eomi, mmm  Neck: no bruits or jvd noted, thyroid normal  Lungs:  Normal expansion. Clear to auscultation. No rales, rhonchi, or wheezing. Heart:  Heart sounds are normal.  Regular rate and rhythm without murmur, gallop or rub. Abdomen:  +bs, soft, nt, nd, no hepatosplenomegaly   Extremities: Extremities warm to touch, pink, with no edema. Psychiatric: mood and affect appropriate; judgement and insight intact  Musculoskeletal:  Rom, muscular strength intact; digits, nails normal    Data/  CBC:   Lab Results   Component Value Date    WBC 4.7 11/25/2021    RBC 2.98 11/25/2021    HGB 9.5 11/25/2021    HCT 28.0 11/25/2021    MCV 93.9 11/25/2021    MCH 31.9 11/25/2021    MCHC 33.9 11/25/2021    RDW 15.0 11/25/2021     11/25/2021     BMP:    Lab Results   Component Value Date     11/25/2021    K 5.1 11/25/2021    CL 97 11/25/2021    CO2 29 11/25/2021    BUN 60 11/25/2021    LABALBU 3.2 11/25/2021    LABALBU 3.3 06/15/2021    CREATININE 7.33 11/25/2021    CALCIUM 8.5 11/25/2021    GFRAA 8.6 11/25/2021    LABGLOM 7.1 11/25/2021    GLUCOSE 124 11/25/2021    GLUCOSE 228 06/15/2021     Echocardiogram complete 2D with doppler with color    Result Date: 10/28/2021  Transthoracic Echocardiography Report (TTE)  Demographics   Patient Name    Erlinda Mcclelland  Gender               Male                  J   Patient Number  [id number]       Race                                                   Ethnicity   Visit Number    [id number]      Room Number          C855   Corporate ID                   Date of Study        10/28/2021   Accession       [id number]     Referring Physician  Darren France MD  Number   Date of Birth   1935     Sonographer          Milla Olafkirby   Age             81 year(s)     Interpreting         [facility])                                 Physician            Cardiology                                                      [address] [place] [address]  Procedure Type of Study   TTE procedure:ECHO COMPLETE 2D W/DOP W/COLOR.   Procedure Date Date: 10/28/2021 Start: 09:28 AM Study Location: Portable Technical Quality: Adequate visualization Indications:Congestive heart failure. Patient Status: Routine Height: 69 inches Weight: 224 pounds BSA: 2.17 m^2 BMI: 33.08 kg/m^2 BP: 169/57 mmHg  Conclusions   Summary  Normal mitral valve structure and function. Trace (1+) mitral regurgitation is present. Moderately dilated left atrium. Left ventricular ejection fraction is visually estimated at 45%. Pseudonormal filling pattern noted. Signature   ----------------------------------------------------------------  Electronically signed by Carlos Alexandra(Interpreting physician)  on 10/28/2021 05:58 PM  ----------------------------------------------------------------   Findings  Left Ventricle Left ventricular ejection fraction is visually estimated at 45%. Pseudonormal filling pattern noted. Right Ventricle Normal right ventricle structure and function. Normal right ventricle systolic pressure. Left Atrium Moderately dilated left atrium. Right Atrium Normal right atrium. Mitral Valve Normal mitral valve structure and function. Trace (1+) mitral regurgitation is present. Tricuspid Valve Normal tricuspid valve structure and function. Aortic Valve Normal aortic valve structure and function. Pulmonic Valve Normal pulmonic valve structure and function. Pericardial Effusion No evidence of pericardial effusion. Pleural Effusion No evidence of pleural effusion. Aorta \ Miscellaneous Miscellaneous normal findings were found. M-Mode Measurements (cm)   LVIDd: 5.31 cm                         LVIDs: 4 cm  IVSd: 1.69 cm                          IVSs: 1.71 cm  LVPWd: 1.35 cm                         LVPWs: 1.53 cm  Rt. Vent.  Dimension: 2.46 cm           AO Root Dimension: 3.21 cm                                         ACS: 1.9 cm                                         LVOT: 2.11 cm  Doppler Measurements:   AV Velocity:0.03 m/s                   MV Peak E-Wave: 0.85 m/s  AV Peak Gradient: 5.2 mmHg             MV Peak A-Wave: 0.64 m/s  AV Mean Gradient: 2.69 mmHg  AV Area (Continuity):3.06 cm^2  Valves  Mitral Valve   Peak E-Wave: 0.85 m/s                 Peak A-Wave: 0.64 m/s                                        E/A Ratio: 1.34                                        Peak Gradient: 2.91 mmHg                                        Deceleration Time: 151.1 msec   Tissue Doppler   E' Septal Velocity: 0.05 m/s  E' Lateral Velocity: 0.07 m/s   Aortic Valve   Peak Velocity: 1.14 m/s                Mean Velocity: 0.77 m/s  Peak Gradient: 5.2 mmHg                Mean Gradient: 2.69 mmHg  Area (continuity): 3.06 cm^2  AV VTI: 30.04 cm   Cusp Separation: 1.9 cm   LVOT   Peak Velocity: 1.01 m/s               Mean Velocity: 0.62 m/s  Peak Gradient: 3.44 mmHg              Mean Gradient: 1.8 mmHg  LVOT Diameter: 2.11 cm                LVOT VTI: 26.26 cm  Structures  Left Atrium   LA Volume/Index: 76.18 ml /35 m^2             LA Area: 22.46 cm^2   Left Ventricle   Diastolic Dimension: 6.65 cm          Systolic Dimension: 4 cm  Septum Diastolic: 1.74 cm             Septum Systolic: 9.49 cm  PW Diastolic: 0.09 cm                 PW Systolic: 7.09 cm                                        FS: 24.7 %  LV EDV/LV EDV Index: 135.73 ml/63 m^2 LV ESV/LV ESV Index: 69.83 ml/32 m^2  EF Calculated: 48.6 %                 LV Length: 7.35 cm   LVOT Diameter: 2.11 cm   Right Ventricle   Diastolic Dimension: 9.56 cm  Aorta/ Miscellaneous Aorta   Aortic Root: 3.21 cm  LVOT Diameter: 2.11 cm      CT Head WO Contrast    Result Date: 11/25/2021  EXAMINATION:  CT HEAD WO CONTRAST HISTORY:  Altered mental status TECHNIQUE:  Serial axial images without IV contrast were obtained from the vertex to the foramen magnum. Sagittal and coronal reconstructions. All CT scans at this facility use dose modulation, iterative reconstruction, and/or weight based dosing when appropriate to reduce radiation dose to as low as reasonably achievable.  COMPARISON:  MRI brain 11/3/2021 RESULT: Acute change:   No portion of the lower head demonstrate age-related parenchymal volume loss changes. NO RADIOPAQUE FOREIGN BODY. PROMINENT ANTERIOR CERVICAL SPURRING. CT CHEST WO CONTRAST    Result Date: 10/27/2021  HISTORY: Sahara Mayes is a Male of 81 years age. DIAGNOSIS:  sob COMPARISON: None available. TECHNIQUE: Thin spiral axial CT was performed from the thoracic inlet to the lung bases, without intravenous contrast administration. 2-D reformatted axial, sagittal and coronal images were obtained. 3-D MIPS images were also performed. FINDINGS: Moderate size pleural effusions are seen bilaterally. The heart is mildly enlarged. Increased interstitial markings are seen bilaterally. Atelectasis or consolidation is seen in both bases. No aneurysm is seen. The main pulmonary artery measures 31 mm. Atherosclerotic calcifications are seen in the great vessels and superior mediastinum, aortic arch and coronary stents and/or calcifications are seen. There is evidence of median sternotomy. No pericardial effusion is seen. Degenerative changes are seen in the spine. Limited survey views of the upper abdomen show calcifications in the spleen consistent with old granulomatous disease. The gallbladder is surgically absent. There are extensive atherosclerotic calcifications seen in the visualized portion of the abdomen. 1. CHF 2. Bilateral pleural effusions with atelectasis and/or infiltrate 3. Enlarged main pulmonary artery. Reformatted pulmonary arterial hypertension All CT scans at this facility use dose modulation, iterative reconstruction, and/or weight based dosing when appropriate to reduce radiation dose to as low as reasonably achievable. XR CHEST PORTABLE    Result Date: 11/25/2021  EXAMINATION:  CHEST RADIOGRAPH (PORTABLE SINGLE AP VIEW) Exam Date/Time:  11/25/2021 8:06 AM Clinical History:   Hypoglycemia Comparison:  Chest radiograph 11/20/2021  FINDINGS: Lines, tubes, and devices:  Sternotomy wires. Cardiomediastinal silhouette:  Stable cardiomegaly. There is central venous congestion. Lungs and pleura: There are bibasilar predominant parenchymal opacities greater on the left. Bilateral pleural effusions, small right and moderate left. No pneumothorax. Cardiomegaly with pulmonary edema and bilateral pleural effusions. XR CHEST PORTABLE    Result Date: 11/21/2021  EXAMINATION: XR CHEST PORTABLE CLINICAL HISTORY: HYPOXIA, CHF COMPARISONS: CT CHEST, OCTOBER 27, 2021, MARCH 30, 2027 FINDINGS: Median sternotomy. Patient rotated to right. Cardiopericardial silhouette upper limits normal. Pulmonary vasculature prominent. BORDERLINE CARDIOMEGALY WITH PULMONARY VENOUS CONGESTION. MRI BRAIN WO CONTRAST    Result Date: 11/3/2021  EXAMINATION: MRI BRAIN WO CONTRAST CLINICAL HISTORY:  left subcortical infarct? COMPARISONS: NONE AVAILABLE TECHNIQUE: Multiplanar multisequence images of the brain were obtained without contrast. Diffusion perfusion imaging was obtained. BRAIN MRI FINDINGS:  There are no extra-axial collections. There is no evidence of hemorrhage. The susceptibility images do not demonstrate evidence of hemosiderin deposition within the brain parenchyma or the leptomeninges. There is preservation of the gray-white matter differentiation. There is a 6 mm area of restricted diffusion in the subcortical white matter of the left frontal lobe indicating acute ischemia. There are a few scattered foci of T2/FLAIR hyperintensity consistent with chronic microangiopathy and there is chronic encephalomalacia involving the right frontal lobe in the inferior left cerebellum indicating prior remote infarcts. There is prominence of the sulci and ventricles consistent with moderate global cerebral atrophy and chronic involutional changes. The midline structures are intact, the corpus callosum is within normal limits. The region of the pineal gland and the sella turcica are unremarkable.  There are no space-occupying lesions in the posterior fossa. The basilar cisterns are patent. The craniocervical junction is unremarkable. The visualized portions of the orbits are within normal limits, the globes are intact. The visualized portions of the paranasal sinuses are within normal limits. The calvarium and soft tissues are unremarkable. There is a 6 mm area of restricted diffusion consistent with a small acute ischemic infarct in the subcortical white matter of the left frontal lobe. There is no significant associated mass effect or edema. There are chronic involutional changes and evidence of chronic ischemic events in the right frontal lobe and in the left cerebellum. FL MODIFIED BARIUM SWALLOW W VIDEO    Result Date: 10/29/2021  EXAMINATION: FL MODIFIED BARIUM SWALLOW W VIDEO DATE AND TIME:10/28/2021 2:50 PM CLINICAL HISTORY: Dysphagia. Possible aspiration. dysphagia; odynophagia  COMPARISON: None available. Technique: A modified barium swallow study was performed in the Radiology Suite in conjunction with Speech Therapy. The patient was seated upright throughout this assessment. Multiple consistencies were used to evaluate swallowing function and safety. Videofluoroscopic imaging was utilized (this is considered equivalent to one image for purposes of compliance with MIPS). Fluoroscopy time was 3.3 minutes Findings:     Oral phase: There was prolonged mastication and bolus formation with some premature spillage of barium to the level of the valleculae and  pyriform sinuses     Pharyngeal Phase: There was some pharyngeal dysfunction without evidence of penetration or aspiration. Esophageal phase: The patient's upper esophageal sphincter opens normally. There is no diverticulum stricture or fistula. Residual:There was some vallecular and pyriform sinus residue which adequately cleared. Moderate oral and pharyngeal dysfunction without aspiration.  Please refer to speech pathology  report for recommendations. Assessment/  81years old male presented with generalized weakness of acute onset and progressive course with shortness of breath admitted with hypoglycemia acute kidney injury on top of chronic kidney disease associated with electrolyte imbalance in the form of hyperkalemia  Patient does carry the chronic comorbidities of diabetes chronic kidney disease stage III-IV patient was recently admitted to our facility and Silke Wu has been involved in the patient care 3 weeks ago when discharge was okay to follow-up in outpatient basis  -Uncontrolled systolic hypertension observe stretch of bradycardia potassium was five-point 6 repeat at 5.1  -No acid-base imbalance  -Anemia 9.5 hemoglobin  -BUN and creatinine 60 and 7.3 compared to a creatinine of 4 on patient was previously hospitalized    Acute kidney injury versus end-stage renal disease patient is nonoliguric nonpolyuric worsening GFR with net negative fluid balance underlying etiology of the acute kidney injury could be secondary to aggravated renal insult with prerenal azotemia    Plan/  1-control the blood pressure using calcium channel blocker  2-IV fluid x1 L  3-clinical laboratory assessed the patient tomorrow his work-up anemia    Discussed with the patient and family the possibility of renal replacement therapy    Thank you for the consultation. Please do not hesitate to call with questions.     Seymour Coyne MD

## 2021-11-25 NOTE — ED PROVIDER NOTES
[address] [facility] ED  eMERGENCYdEPARTMENT eNCOUnter      Pt Name: Dorma Collet  MRN: [id number]  [place] 1935  Date of evaluation: 11/25/2021  Shannan Soni MD    CHIEF COMPLAINT           HPI  Dorma Collet is a 81 y.o. male per chart review has a h/o CHF, CAD, HTN, Hpl, DM II presents to the ED with weakness. Per wife, pt with gradual onset, moderate, constant, diffuse weakness since 1 am.  Pt's BS noted to be 35 per EMS. Pt given IV d50. Pt upon arrival unsure of why he is here. Pt denies any pain. ROS  Review of Systems   Constitutional: Negative for activity change, chills and fever. HENT: Negative for ear pain and sore throat. Eyes: Negative for visual disturbance. Respiratory: Negative for cough and shortness of breath. Cardiovascular: Negative for chest pain, palpitations and leg swelling. Gastrointestinal: Negative for abdominal pain, diarrhea, nausea and vomiting. Genitourinary: Negative for dysuria. Musculoskeletal: Negative for back pain. Skin: Negative for rash. Neurological: Positive for weakness. Negative for dizziness. Except as noted above the remainder of the review of systems was reviewed and negative.        PAST MEDICAL HISTORY     Past Medical History:   Diagnosis Date    CAD (coronary artery disease)     Hyperlipidemia          SURGICAL HISTORY       Past Surgical History:   Procedure Laterality Date    CARDIAC SURGERY      UPPER GASTROINTESTINAL ENDOSCOPY N/A 10/29/2021    EGD BIOPSY performed by Asa Ormond, MD at [facility]       Previous Medications    AMIODARONE (CORDARONE) 200 MG TABLET    Take 1 tablet by mouth 2 times daily    AMLODIPINE (NORVASC) 5 MG TABLET    Take 1 tablet by mouth daily    APIXABAN (ELIQUIS) 2.5 MG TABS TABLET    Take 1 tablet by mouth 2 times daily    ATORVASTATIN (LIPITOR) 20 MG TABLET    Take 1 tablet by mouth nightly    CARVEDILOL (COREG) 25 MG TABLET    Take 1 tablet by mouth 2 times daily    GLIMEPIRIDE (AMARYL) 4 MG TABLET    Take 4 mg by mouth every morning (before breakfast) Indications: Diabetes     HYDRALAZINE (APRESOLINE) 100 MG TABLET    Take 1 tablet by mouth every 12 hours    MULTIPLE VITAMINS-MINERALS (CENTRUM SILVER ADULT 50+) TABS    Take 1 tablet by mouth daily Indications: Nutritional Support     NITROGLYCERIN (NITROSTAT) 0.4 MG SL TABLET    Place 0.4 mg under the tongue every 5 minutes as needed for Chest pain Indications: Chest Pain up to max of 3 total doses. If no relief after 1 dose, call 911. SITAGLIPTIN (JANUVIA) 100 MG TABLET    Take 100 mg by mouth daily Indications: Diabetes     TORSEMIDE (DEMADEX) 10 MG TABLET    Take 5 tablets by mouth daily       ALLERGIES     Patient has no known allergies. FAMILY HISTORY     History reviewed. No pertinent family history. SOCIAL HISTORY       Social History     Socioeconomic History    Marital status:      Spouse name: None    Number of children: None    Years of education: None    Highest education level: None   Occupational History    None   Tobacco Use    Smoking status: Never Smoker    Smokeless tobacco: Never Used   Vaping Use    Vaping Use: Never used   Substance and Sexual Activity    Alcohol use: Not Currently    Drug use: Not Currently    Sexual activity: Not Currently   Other Topics Concern    None   Social History Narrative    None     Social Determinants of Health     Financial Resource Strain:     Difficulty of Paying Living Expenses: Not on file   Food Insecurity:     Worried About Running Out of Food in the Last Year: Not on file    Giancarlo of Food in the Last Year: Not on file   Transportation Needs:     Lack of Transportation (Medical): Not on file    Lack of Transportation (Non-Medical):  Not on file   Physical Activity:     Days of Exercise per Week: Not on file    Minutes of Exercise per Session: Not on file   Stress:     Feeling of Stress : Not on file   Social Connections:     Frequency of Communication with Friends and Family: Not on file    Frequency of Social Gatherings with Friends and Family: Not on file    Attends Confucianism Services: Not on file    Active Member of [address] [place] [address] [employer] or Organizations: Not on file    Attends Club or Organization Meetings: Not on file    Marital Status: Not on file   Intimate Partner Violence:     Fear of Current or Ex-Partner: Not on file    Emotionally Abused: Not on file    Physically Abused: Not on file    Sexually Abused: Not on file   Housing Stability:     Unable to Pay for Housing in the Last Year: Not on file    Number of [place] in the Last Year: Not on file    Unstable Housing in the Last Year: Not on file         PHYSICAL EXAM       ED Triage Vitals   BP Temp Temp Source Pulse Resp SpO2 Height Weight   11/25/21 0718 11/25/21 0712 11/25/21 0712 11/25/21 0712 11/25/21 0714 11/25/21 0718 11/25/21 0712 11/25/21 0712   (!) 152/102 96 °F (35.6 °C) Temporal 50 12 94 % 5' 9.5\" (1.765 m) 195 lb (88.5 kg)       Physical Exam  Vitals and nursing note reviewed. Constitutional:       Appearance: He is well-developed. HENT:      Head: Normocephalic. Right Ear: External ear normal.      Left Ear: External ear normal.   Eyes:      Conjunctiva/sclera: Conjunctivae normal.      Pupils: Pupils are equal, round, and reactive to light. Cardiovascular:      Rate and Rhythm: Regular rhythm. Bradycardia present. Heart sounds: Normal heart sounds. Pulmonary:      Effort: Pulmonary effort is normal.      Breath sounds: Normal breath sounds. Abdominal:      General: Bowel sounds are normal. There is no distension. Palpations: Abdomen is soft. Tenderness: There is no abdominal tenderness. Musculoskeletal:         General: Normal range of motion. Cervical back: Normal range of motion and neck supple. Skin:     General: Skin is warm and dry.    Neurological:      Mental Status: He is alert and oriented to person, place, and time. Comments: Tremor noted in LUE. Diffusely normal strength, sensation   Psychiatric:      Comments: Anxious           MDM  80 yo male presents to the ED with weakness, hypoglycemia. Pt is afebrile, hemodynamically stable. Pt given 250 ccs NS in the ED. Pt's BS 110s. EKG shows sinus compa with HR 45, normal axis, , QTc normal.  Labs remarkable for K 5.6 (unhemolyzed), BUN 60, Cr 7.33, WBC 4.7, Hb 9.5, troponin 0.020. CT head, CXR negative. Pt's baseline Cr is about 5. Pt was recently admitted for CHF. Pt spoke to nephrology and cardiology and he refused dialysis. Pt was started on 50 mg demadex per nephrology and cardiology. Pt still with swelling to lower extremities. Oh placed in pt with resultant 700 ccs of urine return. Pt likely with bph and even though pt was placed on demadex he was unable to urinate due to urinary retention. Pt's Cr today is 7.33 likely as a result of urinary retention as well. Family informed of the results. Pt is hypoglycemic today as he takes a sulfonylurea for DM II. Sulfonylurea's are cleared renally. Pt will likely need to stop sulfonylurea. Given SHANDRA, hypoglycemia, urinary retention, case discussed with Dr. Zoila Montoya (medicine) who recommended admission. Pt admitted to medicine in stable condition. FINAL IMPRESSION      1. SHANDRA (acute kidney injury) ([facility] [address]    2. Hypoglycemia    3. Hyperkalemia    4.  Urinary retention          DISPOSITION/PLAN   DISPOSITION Admitted 11/25/2021 09:47:18 AM        DISCHARGE MEDICATIONS:  [unfilled]         Hever Khan MD(electronically signed)  Attending Emergency Physician            Hever Khan MD  11/25/21 4279

## 2021-11-25 NOTE — ED NOTES
Attempted to call report Michael Valdez RN is in a room with a pt at this time      Serafina Brittle, RN  11/25/21 1733

## 2021-11-25 NOTE — H&P
Hospital Medicine  History and Physical    Patient:  Ondina Coley  MRN: [id number]    CHIEF COMPLAINT:    Chief Complaint   Patient presents with    Hypoglycemia       History Obtained From:  Patient, EMR  Primary Care Physician: Carmen Glass DO    HISTORY OF PRESENT ILLNESS:   The patient is a 81 y.o. male with PMH of cardiomyopathy, heart failure, progressive renal failure, anemia, functional decline. He was just discharged to home few weeks ago. The patient return back to the emergency room with multiple nonspecific complaints. He was found to be hypoglycemic. He had some change in mental status but resolved. No chest pain or palpitation. The patient reports having generalized weakness. Patient is unable to stand up or ambulate on his own. No fever or chills. No chest pain or palpitation. No abdominal pain, nausea or vomiting. Past Medical History:      Diagnosis Date    CAD (coronary artery disease)     Hyperlipidemia        Past Surgical History:      Procedure Laterality Date    CARDIAC SURGERY      UPPER GASTROINTESTINAL ENDOSCOPY N/A 10/29/2021    EGD BIOPSY performed by Sunny Essex, MD at [facility]       Medications Prior to Admission:    Prior to Admission medications    Medication Sig Start Date End Date Taking?  Authorizing Provider   amiodarone (CORDARONE) 200 MG tablet Take 1 tablet by mouth 2 times daily  Patient taking differently: Take 200 mg by mouth 2 times daily Indications: Heart Rhythm Disorder  11/3/21   Ever Gutierrez MD   amLODIPine (NORVASC) 5 MG tablet Take 1 tablet by mouth daily  Patient taking differently: Take 5 mg by mouth daily Indications: High Blood Pressure Disorder  11/3/21   Ever Gutierrez MD   apixaban (ELIQUIS) 2.5 MG TABS tablet Take 1 tablet by mouth 2 times daily  Patient taking differently: Take 2.5 mg by mouth 2 times daily Indications: Anticoagulant Therapy  11/3/21   Ever Gutierrez MD   atorvastatin (LIPITOR) 20 MG tablet Take 1 tablet by mouth nightly  Patient taking differently: Take 20 mg by mouth nightly Indications: High Amount of Fats in the Blood  11/3/21   Shai Smiley MD   carvedilol (COREG) 25 MG tablet Take 1 tablet by mouth 2 times daily  Patient taking differently: Take 25 mg by mouth 2 times daily Indications: High Blood Pressure Disorder  11/3/21   Shai Smiley MD   torsemide (DEMADEX) 10 MG tablet Take 5 tablets by mouth daily  Patient taking differently: Take 50 mg by mouth daily Indications: Treatment with Diuretic Therapy Give 5 tabs daily 11/3/21   Shai Smiley MD   hydrALAZINE (APRESOLINE) 100 MG tablet Take 1 tablet by mouth every 12 hours  Patient taking differently: Take 100 mg by mouth every 12 hours Indications: High Blood Pressure Disorder  11/3/21   Shai Smiley MD   glimepiride (AMARYL) 4 MG tablet Take 4 mg by mouth every morning (before breakfast) Indications: Diabetes     Historical Provider, MD   SITagliptin (JANUVIA) 100 MG tablet Take 100 mg by mouth daily Indications: Diabetes     Historical Provider, MD   Multiple Vitamins-Minerals (CENTRUM SILVER ADULT 50+) TABS Take 1 tablet by mouth daily Indications: Nutritional Support     Historical Provider, MD   nitroGLYCERIN (NITROSTAT) 0.4 MG SL tablet Place 0.4 mg under the tongue every 5 minutes as needed for Chest pain Indications: Chest Pain up to max of 3 total doses. If no relief after 1 dose, call 911. Historical Provider, MD       Allergies:  Patient has no known allergies. Social History:   TOBACCO:   reports that he has never smoked. He has never used smokeless tobacco.  ETOH:   reports previous alcohol use. Family History:   History reviewed. No pertinent family history.     REVIEW OF SYSTEMS:  Ten systems reviewed and negative except for stated in HPI    Physical Exam:    Vitals: BP (!) 131/54   Pulse (!) 44   Temp 96 °F (35.6 °C) (Temporal)   Resp 11   Ht 5' 9.5\" (1.765 m)   Wt 195 lb (88.5 kg)   SpO2 95%   BMI 28.38 kg/m²   General appearance: alert, appears stated age and cooperative, mild respiratory distress at rest.  Patient has oxygen on. Patient is morbidly obese. Skin: Skin color, texture, turgor normal. No rashes or lesions  HEENT: Head: Normocephalic, no lesions, without obvious abnormality. Neck: no adenopathy, no carotid bruit, no JVD, supple, symmetrical, trachea midline and thyroid not enlarged, symmetric, no tenderness/mass/nodules  Lungs: Bilateral diminished breath sound. Heart: Patient is bradycardic in the 50s. Abdomen: soft, non-tender; bowel sounds normal; no masses,  no organomegaly  Extremities: extremities normal, atraumatic, no cyanosis or edema  Neurologic: Mental status: Patient has a preserved cognition. He is able to understand medical facts, options, alternatives, risk and benefit. Advanced functional loss. Patient is unable to sit up or stand up on his own. Recent Labs     11/25/21  0730   WBC 4.7*   HGB 9.5*        Recent Labs     11/25/21  0730      K 5.6*   CL 97   CO2 29   BUN 60*   CREATININE 7.33*   GLUCOSE 124*   AST 8   ALT 6   BILITOT 0.8*   ALKPHOS 106*     Troponin T:   Recent Labs     11/25/21  0730   TROPONINI 0.020*       ABGs: No results found for: PHART, PO2ART, FQV5HAI  INR: No results for input(s): INR in the last 72 hours. URINALYSIS:  Recent Labs     11/25/21  0730   COLORU Yellow   PHUR 5.0   CLARITYU Clear   SPECGRAV 1.014   LEUKOCYTESUR Negative   UROBILINOGEN 0.2   BILIRUBINUR Negative   BLOODU Negative   GLUCOSEU Negative     -----------------------------------------------------------------   CT Head WO Contrast    Result Date: 11/25/2021  EXAMINATION:  CT HEAD WO CONTRAST HISTORY:  Altered mental status TECHNIQUE:  Serial axial images without IV contrast were obtained from the vertex to the foramen magnum. Sagittal and coronal reconstructions.  All CT scans at this facility use dose modulation, iterative reconstruction, and/or weight based dosing when appropriate to reduce radiation dose to as low as reasonably achievable. COMPARISON:  MRI brain 11/3/2021 RESULT: Acute change:   No evidence of an acute infarct or other acute parenchymal process. Hemorrhage:    No evidence of acute intracranial hemorrhage. Mass Lesion / Mass Effect:   There is no evidence of an intracranial mass or extraaxial fluid collection. No significant mass effect. Chronic change:   Patchy foci of  low attenuation coefficient are present within the supratentorial white matter which is a nonspecific finding but likely represents moderate microvascular ischemia. 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 Parenchyma: There is moderate generalized volume loss. Ventricles:   Ventricular enlargement concordant with the degree of parenchymal volume loss. Paranasal sinuses and skull base: There is partial opacification of the right maxillary sinus. Mastoid air cells are clear. The skull base is unremarkable. Soft tissues unremarkable. No acute intracranial process or significant interval change from prior. Chronic microvascular ischemic changes and remote infarcts. Assessment and Plan     *Hypoglycemia. Most likely secondary to the use of a sulfonylurea in the setting of a progressive renal failure. Hold sulfonylurea and Januvia at this time. D10 for 12 to 24 hours to prevent further hypoglycemic episode. *Acute/subacute chronic systolic and diastolic heart failure. I started the patient on intravenous diuretics. *Cardiomyopathy. Ejection fraction 45%. *Progressive renal failure reaching stage V, end-stage. Suspect cardiorenal syndrome. Rule out the possibility of obstruction or hydronephrosis. UA is unremarkable. Started the patient on intravenous diuretics and requested ultrasound. *Acute hypoxic respiratory failure secondary to fluid overload and worsening pleural effusion. .  His oxygenation is down to 88%.   Patient is a currently on nasal cannula. Saturation 95%. *Hyperkalemia. This was treated in the emergency room. Repeat potassium level. *  Anemia, chronic, no evidence of acute blood loss. Probably anemia of chronic disease secondary to progressive renal failure. Check iron study, ferritin and B12 level. *Functional disability. Patient is unable to sit up or stand up on his own. *Paroxysmal A. fib. Patient is on amiodarone and Eliquis. Patient is bradycardic. Hold beta-blocker. Hold amiodarone for 24 to 48 hours. *Multiple other medical problems not listed above. Patient would likely require to have additional diagnostic, investigative and that therapeutic intervention that the potentially could be taken care of in the outpatient setting. *PPS 20%. THORNTON score is 1/6. Fast score is 7D. ECOG score is 4.    *Poor prognosis given the aforementioned the listed information. Spent about 25 minutes discussing that with the patient is a medical conditions, the need for hemodialysis. We also discussed chest compression, intubation, life support, shocks in case of cardiac or pulmonary arrest.  Patient has preserved cognitive status. He was able to understand conversation related to his other medical conditions including facts, options, alternatives, risk and the benefit ratio. Adamantly patient stated that he does not want hemodialysis even if his refusal would lead to his death. Patient does not want any form of life support, machines, chest compressions or shocks. I will change his CODE STATUS to CCA but the patient is leaning towards DNR CC. Patient is interested in hospice care. I will request hospice consultation.     When I asked the patient about who makes decisions for him or help him making decision he replied \" I am the only one who makes decisions for myself\"    Patient Active Problem List   Diagnosis Code    CHF (congestive heart failure), NYHA class I, acute on chronic[facility] I50.43    Esophageal dysphagia R13.19    SHANDRA [facility]acute kidney injury) [facility] N17.9       Geovanni Strange MD, MD  Admitting Hospitalist    TTS: 85mins where I focused more than 75% of my attention on rendering care, and planning treatment course for this patient, in addition to talking to RN team, mid levels, consulting with other physicians and following up on labs and imaging. High Risk Readmission Screening Tool Score Noted.      Emergency Contact:

## 2021-11-25 NOTE — PROGRESS NOTES
1500: Message sent to Dr. Mellissa Menchaca r/t BP HR meds perameters-- instructed to give. 1600: Critical trop back at 0.018. Notified Dr. Mellissa Menchaca via [employer]. Spoke with family at bedside. Med rec completed. Up with walker at home. Wears 3L at home. No complaints. Safety maintained. Call light within reach.     Electronically signed by Annamarie Deluca RN on 11/25/2021 at 4:43 PM

## 2021-11-25 NOTE — PROGRESS NOTES
Physical Therapy Missed Treatment   Facility/Department: [facility] MED SURG H438/I302-91    NAME: Alex Turner    : 1935 (81 y.o.)  MRN: [id number]    Account: [de-identified]  Gender: male      Chart reviewed and PT eval attempted. Pt reports feeling too fatigued and declines eval today. Agreeable to try eval tomorrow. Nursing staff notified. Will follow and attempt PT evaluation again at earliest availability.        Nadja Roldan, PT, 21 at 3:46 PM

## 2021-11-25 NOTE — CARE COORDINATION
[facility] [place] Case Management Initial Discharge Assessment    Met with Family JANA CASEY to discuss discharge plan. PCP: Simeon Diaz, DO        Date of Last Visit: WITHIN THE PAST WEEK    If no PCP, list provided? N/A    Discharge Planning    Living Arrangements: at home dependent on family care  Anny Cerrato 37 3 WEEKS    Who do you live with? WIFE JULES AND DAUGHTER JANA    Who helps you with your care:  family or spouse    If lives at home:     Do you have any barriers navigating in your home? yes - HAS STAIRS BUT HE DOES NOT HAVE TO USE THEM    Patient can perform ADL? No    Current Services (outpatient and in home) :  [address] [facility] [employer] ([address])    Dialysis: No    Is transportation available to get to your appointments? Yes    DME Equipment:  yes - WALKER, W/C, SHOWER CHAIR, GRAB BARS,LIFTING EASY CHAIR,  BSC, CANE    Respiratory equipment: Continuous Oxygen  3 - 3.5 Liters     Respiratory provider:  yes - MEDICAL SERVICES     Pharmacy:  yes - [facility] [address]    Consult with Medication Assistance Program?  No      Patient agreeable to [facility]? Yes, [facility]    Patient agreeable to SNF/Rehab? Declined, HAD BAD EXPERIENCE AT [facility]    Other discharge needs identified? Other THERE WILL BE A LIST    Does Patient Have a High-Risk for Readmission Diagnosis (CHF, PN, MI, COPD)? No    Initial Discharge Plan? (Note: please see concurrent daily documentation for any updates after initial note). MOSTL LIKELY WILL RETURN HOME    Readmission Risk              Risk of Unplanned Readmission:  0         Electronically signed by Ayaka Villarreal.  ALEX Damian on 11/25/2021 at 11:21 AM

## 2021-11-25 NOTE — ED TRIAGE NOTES
ON ARRIVAL PER EMS PT BLOOD SUGAR 35, EMS GAVE A BAG OF D5 AND PT BECAME ALERT BUT DROWSY, PT WIFE STATES PT COMPLIANT WITH DIABETIC MANAGEMENT

## 2021-11-26 ENCOUNTER — APPOINTMENT (OUTPATIENT)
Dept: ULTRASOUND IMAGING | Age: 86
DRG: 291 | End: 2021-11-26
Payer: MEDICARE

## 2021-11-26 LAB
ANION GAP SERPL CALCULATED.3IONS-SCNC: 11 MEQ/L (ref 9–15)
BASOPHILS ABSOLUTE: 0 K/UL (ref 0–0.2)
BASOPHILS RELATIVE PERCENT: 0.4 %
BUN BLDV-MCNC: 57 MG/DL (ref 8–23)
CALCIUM SERPL-MCNC: 7.9 MG/DL (ref 8.5–9.9)
CHLORIDE BLD-SCNC: 100 MEQ/L (ref 95–107)
CO2: 27 MEQ/L (ref 20–31)
CREAT SERPL-MCNC: 7.38 MG/DL (ref 0.7–1.2)
EKG ATRIAL RATE: 49 BPM
EKG Q-T INTERVAL: 534 MS
EKG QRS DURATION: 120 MS
EKG QTC CALCULATION (BAZETT): 461 MS
EKG R AXIS: 8 DEGREES
EKG T AXIS: -82 DEGREES
EKG VENTRICULAR RATE: 45 BPM
EOSINOPHILS ABSOLUTE: 0.2 K/UL (ref 0–0.7)
EOSINOPHILS RELATIVE PERCENT: 3 %
GFR AFRICAN AMERICAN: 8.5
GFR NON-AFRICAN AMERICAN: 7.1
GLUCOSE BLD-MCNC: 108 MG/DL (ref 60–115)
GLUCOSE BLD-MCNC: 42 MG/DL (ref 60–115)
GLUCOSE BLD-MCNC: 47 MG/DL (ref 70–99)
GLUCOSE BLD-MCNC: 50 MG/DL (ref 60–115)
GLUCOSE BLD-MCNC: 55 MG/DL (ref 60–115)
GLUCOSE BLD-MCNC: 58 MG/DL (ref 60–115)
GLUCOSE BLD-MCNC: 75 MG/DL (ref 60–115)
GLUCOSE BLD-MCNC: 80 MG/DL (ref 60–115)
HCT VFR BLD CALC: 25.6 % (ref 42–52)
HEMOGLOBIN: 8.5 G/DL (ref 14–18)
LYMPHOCYTES ABSOLUTE: 0.9 K/UL (ref 1–4.8)
LYMPHOCYTES RELATIVE PERCENT: 15.7 %
MCH RBC QN AUTO: 31.7 PG (ref 27–31.3)
MCHC RBC AUTO-ENTMCNC: 33.3 % (ref 33–37)
MCV RBC AUTO: 95.2 FL (ref 80–100)
MONOCYTES ABSOLUTE: 0.5 K/UL (ref 0.2–0.8)
MONOCYTES RELATIVE PERCENT: 7.9 %
NEUTROPHILS ABSOLUTE: 4.4 K/UL (ref 1.4–6.5)
NEUTROPHILS RELATIVE PERCENT: 73 %
PDW BLD-RTO: 15.1 % (ref 11.5–14.5)
PERFORMED ON: ABNORMAL
PERFORMED ON: NORMAL
PHOSPHORUS: 7.4 MG/DL (ref 2.3–4.8)
PLATELET # BLD: 142 K/UL (ref 130–400)
POTASSIUM SERPL-SCNC: 4.8 MEQ/L (ref 3.4–4.9)
RBC # BLD: 2.69 M/UL (ref 4.7–6.1)
RETICULOCYTE ABSOLUTE COUNT: 0.08 M/CUMM (ref 0.02–0.11)
RETICULOCYTE COUNT PCT: 3.1 % (ref 0.6–2.2)
SODIUM BLD-SCNC: 138 MEQ/L (ref 135–144)
WBC # BLD: 6 K/UL (ref 4.8–10.8)

## 2021-11-26 PROCEDURE — 2580000003 HC RX 258: Performed by: INTERNAL MEDICINE

## 2021-11-26 PROCEDURE — 99222 1ST HOSP IP/OBS MODERATE 55: CPT | Performed by: NURSE PRACTITIONER

## 2021-11-26 PROCEDURE — 2500000003 HC RX 250 WO HCPCS: Performed by: INTERNAL MEDICINE

## 2021-11-26 PROCEDURE — 6360000002 HC RX W HCPCS: Performed by: INTERNAL MEDICINE

## 2021-11-26 PROCEDURE — 85025 COMPLETE CBC W/AUTO DIFF WBC: CPT

## 2021-11-26 PROCEDURE — 6370000000 HC RX 637 (ALT 250 FOR IP): Performed by: NURSE PRACTITIONER

## 2021-11-26 PROCEDURE — 84100 ASSAY OF PHOSPHORUS: CPT

## 2021-11-26 PROCEDURE — 85046 RETICYTE/HGB CONCENTRATE: CPT

## 2021-11-26 PROCEDURE — 6370000000 HC RX 637 (ALT 250 FOR IP): Performed by: INTERNAL MEDICINE

## 2021-11-26 PROCEDURE — 1210000000 HC MED SURG R&B

## 2021-11-26 PROCEDURE — 80048 BASIC METABOLIC PNL TOTAL CA: CPT

## 2021-11-26 PROCEDURE — 97162 PT EVAL MOD COMPLEX 30 MIN: CPT

## 2021-11-26 PROCEDURE — 36415 COLL VENOUS BLD VENIPUNCTURE: CPT

## 2021-11-26 PROCEDURE — 2700000000 HC OXYGEN THERAPY PER DAY

## 2021-11-26 PROCEDURE — 76775 US EXAM ABDO BACK WALL LIM: CPT

## 2021-11-26 RX ORDER — SERTRALINE HYDROCHLORIDE 25 MG/1
25 TABLET, FILM COATED ORAL DAILY
Status: DISCONTINUED | OUTPATIENT
Start: 2021-11-26 | End: 2021-11-27

## 2021-11-26 RX ADMIN — HYDRALAZINE HYDROCHLORIDE 50 MG: 50 TABLET, FILM COATED ORAL at 13:38

## 2021-11-26 RX ADMIN — HYDRALAZINE HYDROCHLORIDE 50 MG: 50 TABLET, FILM COATED ORAL at 20:37

## 2021-11-26 RX ADMIN — DEXTROSE MONOHYDRATE 12.5 G: 25 INJECTION, SOLUTION INTRAVENOUS at 09:12

## 2021-11-26 RX ADMIN — FUROSEMIDE 40 MG: 10 INJECTION, SOLUTION INTRAMUSCULAR; INTRAVENOUS at 09:18

## 2021-11-26 RX ADMIN — DEXTROSE MONOHYDRATE: 100 INJECTION, SOLUTION INTRAVENOUS at 10:26

## 2021-11-26 RX ADMIN — IRON SUCROSE 100 MG: 20 INJECTION, SOLUTION INTRAVENOUS at 12:13

## 2021-11-26 RX ADMIN — APIXABAN 2.5 MG: 2.5 TABLET, FILM COATED ORAL at 20:36

## 2021-11-26 RX ADMIN — EPOETIN ALFA-EPBX 3000 UNITS: 3000 INJECTION, SOLUTION INTRAVENOUS; SUBCUTANEOUS at 12:01

## 2021-11-26 RX ADMIN — SERTRALINE HYDROCHLORIDE 25 MG: 25 TABLET ORAL at 12:13

## 2021-11-26 RX ADMIN — Medication 10 ML: at 22:00

## 2021-11-26 RX ADMIN — HYDRALAZINE HYDROCHLORIDE 50 MG: 50 TABLET, FILM COATED ORAL at 05:52

## 2021-11-26 RX ADMIN — ATORVASTATIN CALCIUM 20 MG: 20 TABLET, FILM COATED ORAL at 20:36

## 2021-11-26 RX ADMIN — Medication 10 ML: at 09:22

## 2021-11-26 RX ADMIN — AMLODIPINE BESYLATE 5 MG: 5 TABLET ORAL at 09:18

## 2021-11-26 RX ADMIN — APIXABAN 2.5 MG: 2.5 TABLET, FILM COATED ORAL at 09:18

## 2021-11-26 RX ADMIN — DEXTROSE MONOHYDRATE 12.5 G: 25 INJECTION, SOLUTION INTRAVENOUS at 16:29

## 2021-11-26 ASSESSMENT — ENCOUNTER SYMPTOMS
ALLERGIC/IMMUNOLOGIC NEGATIVE: 1
NAUSEA: 0
CHEST TIGHTNESS: 0
SORE THROAT: 0
VOMITING: 0
CHOKING: 0
COUGH: 0
DIARRHEA: 0
WHEEZING: 0
ABDOMINAL DISTENTION: 0
SHORTNESS OF BREATH: 1
CONSTIPATION: 0
EYES NEGATIVE: 1
STRIDOR: 0
BLOOD IN STOOL: 0
ABDOMINAL PAIN: 0
TROUBLE SWALLOWING: 1

## 2021-11-26 NOTE — PROGRESS NOTES
Please call pt AND guardian    XRay is ok    Urine shows bacteria and some blood like before, Rx sent    Seek care if worsening pain.  SChedule CT if not better by Monday to look for stone   Renal Progress Note  Assessment/  81years old male presented with generalized weakness of acute onset and progressive course with shortness of breath admitted with hypoglycemia acute kidney injury on top of chronic kidney disease associated with electrolyte imbalance in the form of hyperkalemia  Patient does carry the chronic comorbidities of diabetes chronic kidney disease stage III-IV patient was recently admitted to our facility and [place] has been involved in the patient care 3 weeks ago when discharge was okay to follow-up in outpatient basis  -Uncontrolled systolic hypertension observe stretch of bradycardia potassium was five-point 6 repeat at 5.1 currently potassium is 4.8  -No acid-base imbalance  -Anemia 9.5 hemoglobin  -BUN and creatinine 60 and 7.3 compared to a creatinine of 4 on patient was previously hospitalized     Acute kidney injury versus end-stage renal disease patient is nonoliguric nonpolyuric worsening GFR with net negative fluid balance underlying etiology of the acute kidney injury could be secondary to aggravated renal insult with prerenal azotemia     Plan/  1-control the blood pressure using calcium channel blocker better controlled however will require amlodipine 5 mg twice daily  2-IV fluid x1 L no need for more IV fluid  3-anemia requiring iron plus or minus JOSE DAVID we will start with iron in the form of Venofer IV    Discussed with the patient and family the the patient may need to dialysis on arrival and then wait and see in the beginning patient agreed when he went to get the consent form he did refuse to be put on any kind of renal replacement therapy consult for hemodialysis catheter and the hemodialysis order has been discontinued  We will continue to treat conservatively  Patient Active Problem List:     CHF (congestive heart failure), NYHA class I, acute on chronic, combined ([address]     Esophageal dysphagia     SHANDRA (acute kidney injury) ([address]      Subjective:   Admit Date: 11/25/2021    Interval History: Apart from generalized weakness tiredness inability to participate in regular daily activity and loss of appetite there is no any other uremic related or fluid volume overload related symptoms    Medications:   Scheduled Meds:   iron sucrose  100 mg IntraVENous Q24H    epoetin manjeet-epbx  3,000 Units SubCUTAneous Once per day on Mon Thu    sertraline  25 mg Oral Daily    sodium chloride flush  10 mL IntraVENous 2 times per day    [Held by provider] amiodarone  200 mg Oral Daily    apixaban  2.5 mg Oral BID    atorvastatin  20 mg Oral Nightly    hydrALAZINE  50 mg Oral 3 times per day    insulin lispro  0-6 Units SubCUTAneous TID WC    insulin lispro  0-3 Units SubCUTAneous Nightly    furosemide  40 mg IntraVENous Daily    amLODIPine  5 mg Oral Daily     Continuous Infusions:   sodium chloride      dextrose      dextrose 50 mL/hr at 11/26/21 1026       CBC:   Recent Labs     11/25/21  0730 11/26/21  0849   WBC 4.7* 6.0   HGB 9.5* 8.5*    142     CMP:    Recent Labs     11/25/21  0730 11/25/21  1513 11/26/21  0849     --  138   K 5.6* 5.1* 4.8   CL 97  --  100   CO2 29  --  27   BUN 60*  --  57*   CREATININE 7.33*  --  7.38*   GLUCOSE 124*  --  47*   CALCIUM 8.5  --  7.9*   LABGLOM 7.1*  --  7.1*     Troponin:   Recent Labs     11/25/21  1938   TROPONINI 0.020*     BNP: No results for input(s): BNP in the last 72 hours. INR: No results for input(s): INR in the last 72 hours. Lipids: No results for input(s): CHOL, LDLDIRECT, TRIG, HDL, AMYLASE, LIPASE in the last 72 hours. Liver:   Recent Labs     11/25/21  0730   AST 8   ALT 6   ALKPHOS 106*   PROT 6.7   LABALBU 3.2*   BILITOT 0.8*     Iron:    Recent Labs     11/25/21  1513   FERRITIN 71.9     Urinalysis: No results for input(s): UA in the last 72 hours.     Objective:   Vitals: BP (!) 149/44   Pulse 54   Temp 98.1 °F (36.7 °C) (Oral)   Resp 14   Ht 5' 9.5\" (1.765 m)   Wt 195 lb (88.5 kg)   SpO2 93% BMI 28.38 kg/m²    Wt Readings from Last 3 Encounters:   11/25/21 195 lb (88.5 kg)   11/20/21 205 lb (93 kg)   11/12/21 215 lb (97.5 kg)      24HR INTAKE/OUTPUT:      Intake/Output Summary (Last 24 hours) at 11/26/2021 1221  Last data filed at 11/26/2021 0454  Gross per 24 hour   Intake 529 ml   Output 1400 ml   Net -871 ml       Constitutional:  Alert, awake, no apparent distress   Skin:normal, no rash  HEENT:sclera anicteric.   Head atraumatic normocephalic  Neck:supple with no thyromegally  Cardiovascular:  S1, S2 without m/r/g   Respiratory:  CTA B without w/r/r diminished air entry bibasilar abdomen: +bs, soft, nt  Ext: No clinically significant LE edema  Musculoskeletal:Intact  Neuro:Alert and oriented with no deficit      Electronically signed by Hoa Coleman MD on 11/26/2021 at 12:21 PM

## 2021-11-26 NOTE — CARE COORDINATION
READMISSION ASSESSMENT COMPLETED. PALLIATIVE CONSULT TODAY. / TO CONTINUE TO FOLLOW FOR DISCHARGE DISPO. HOME WITH HOSPICE? HOME WITH HHC?

## 2021-11-26 NOTE — PROGRESS NOTES
Continues to report having shortness of breath and dyspnea on exertion. No chest pain or palpitation. Patient is very weak and exhausted. ROS: 12 system review otherwise is negative for acute signs or symptoms over the last 24 hrs. Exam: General appearance: alert, appears stated age and cooperative, mild respiratory distress at rest.  Patient has oxygen on. Patient is morbidly obese. Skin: Skin color, texture, turgor normal. No rashes or lesions  HEENT: Head: Normocephalic, no lesions, without obvious abnormality. Neck: no adenopathy, no carotid bruit, no JVD, supple, symmetrical, trachea midline and thyroid not enlarged, symmetric, no tenderness/mass/nodules  Lungs: Bilateral diminished breath sound. Heart: Patient is bradycardic in the 50s. Abdomen: soft, non-tender; bowel sounds normal; no masses,  no organomegaly  Extremities: extremities normal, atraumatic, no cyanosis or edema  Neurologic: Mental status: Patient has a preserved cognition. He is able to understand medical facts, options, alternatives, risk and benefit. Advanced functional loss. Patient is unable to sit up or stand up on his own. Assessment and plan:     *Hypoglycemia. Most likely secondary to the use of a sulfonylurea in the setting of a progressive renal failure. Hold sulfonylurea and Januvia at this time. Patient has been on D10 since yesterday. His blood sugar continues to be less than 80. Continue D10 at this time. *Progressive renal failure associated with the fluid overload, respiratory distress and pleural effusion. Suspect cardiorenal syndrome. I started the patient on intravenous diuretics. Defer further needed volume management to nephrology team.     *Acute/subacute chronic systolic and diastolic heart failure. I started the patient on intravenous diuretics. Dr. Glenda Corona gave the patient 1 L of IV fluid. I will let him manage his volume status, fluid or diuretics.     *Cardiomyopathy.   Ejection fraction 45%.     *Acute hypoxic respiratory failure secondary to fluid overload and worsening pleural effusion. .  His oxygenation is down to 88%. Patient is a currently on nasal cannula. Saturation 95%. I started the patient on diuretics. The patient was given 1 L of IV fluid by nephrology. I would defer further needed fluid and volume management to nephrology team.     *Hyperkalemia. This was treated in the emergency room. Repeat potassium level. Repeat potassium level is 4.8.     *  Anemia, chronic, no evidence of acute blood loss. Probably anemia of chronic disease secondary to progressive renal failure. Check iron study, ferritin and B12 level.     *Functional disability. Patient is unable to sit up or stand up on his own.     *Paroxysmal A. fib. Patient is on amiodarone and Eliquis. Patient is bradycardic. Hold beta-blocker. Hold amiodarone for 24 to 48 hours.     *Multiple other medical problems not listed above. Patient would likely require to have additional diagnostic, investigative and that therapeutic intervention that the potentially could be taken care of in the outpatient setting.     *PPS 20%. THORNTON score is 1/6. Fast score is 7D. ECOG score is 4.     Patient to continue due to diffuse the initiation of hemodialysis. Patient is aware that without hemodialysis his life expectancy is short. *Poor prognosis given the aforementioned the listed information. I may or may not have addressed all of this pt symptoms, medical issues, abnormal labs and findings. Pt will need additional work up, investigation, testing, surveillance, and treatment to be done at a later time and date during this hospitalization or post discharge by PCP and other out pt providers. Portion of patient care is managed by other providers. Please refer to their notes for details. I will follow specialists recommendations given their expertise in specialty subject matters.   I will transfer patient to a tertiary care center if recommended by specialists.

## 2021-11-26 NOTE — PROGRESS NOTES
Physical Therapy Med Surg Initial Assessment  Facility/Department[facility]  [facility]       NAME: Cassi Nelson  : 1935 (81 y.o.)  MRN: [id number]  CODE STATUS: DNR-CC    Date of Service: 2021    Patient Diagnosis(es): Hyperkalemia [E87.5]  Urinary retention [R33.9]  Hypoglycemia [E16.2]  SHANDRA (acute kidney injury) [facility]) [N17.9]   Chief Complaint   Patient presents with    Hypoglycemia     Patient Active Problem List    Diagnosis Date Noted    SHANDRA (acute [facility]) ([facility] 2021    Esophageal dysphagia     CHF (congestive heart failure), NYHA class I, acute on chronic, combined ([facility] 10/27/2021        Past Medical History:   Diagnosis Date    CAD (coronary artery disease)     Hyperlipidemia      Past Surgical History:   Procedure Laterality Date    CARDIAC SURGERY      UPPER GASTROINTESTINAL ENDOSCOPY N/A 10/29/2021    EGD BIOPSY performed by Cate Mcqueen MD at [facility]       Chart Reviewed: Yes  Family / Caregiver Present: No  General Comment  Comments: Pt rsting in bed - agreeable to PT evaluation    Restrictions:  Restrictions/Precautions: Fall Risk     SUBJECTIVE: Subjective: \"I can't get up on my own. \"    Pain  Pre Treatment Pain Screening  Comments / Details: denies    Post Treatment Pain Screening:   Pain Assessment  Pain Assessment:  (unchanged)    Prior Level of Function:  Social/Functional History  Lives With: Spouse, Daughter  Type of Home: House  Home Layout: One level  Home Access: Ramped entrance  Bathroom Shower/Tub: Tub/Shower unit  Bathroom Equipment: Shower chair, Grab bars in shower, Hand-held shower  Home Equipment: Rolling walker, Cane, Wheelchair-manual, 4 wheeled walker, Oxygen (new O2 - about a week ago 4 L)  ADL Assistance: Independent (except assist for bathing)  Homemaking Assistance: Needs assistance  Ambulation Assistance: Independent (with [employer])  Transfer Assistance: Independent  Additional Comments: Usually sleeps in bed but over last few weeks has been sleeping in recliner bc laying flat made it hard to breathe. OBJECTIVE:   Hearing: Within functional limits    Cognition:  Overall Orientation Status: Within Functional Limits  Follows Commands: Within Functional Limits    Observation/Palpation  Observation: No acute distress noted. 3L O2 via NC. Resting SaO2 = 95%. Post activity SaO2 = 94%. Pleasant and motivated. ROM:  RLE PROM: WFL  LLE PROM: WFL    Strength:  Strength RLE  Comment: 3/5 knee ext; hip grossly 2/5; ankle grossly 2/5  Strength LLE  Comment: 3/5 knee ext; hip grossly 2/5; ankle grossly 2/5  Strength Other  Other: Trunk strength grossly 2-/5    Neuro:  Balance  Sitting - Static: Good  Sitting - Dynamic: Fair  Standing - Static: Poor  Standing - Dynamic: Poor  Comments: Requires assist to maintain sitting unsupported at EOB and in standing. Leans to R side and unable to self correct. Motor Control  Gross Motor?:  (delayed motor response and motor planning)  Sensation  Overall Sensation Status: WFL    Bed mobility  Rolling to Right: Minimal assistance  Supine to Sit: Minimal assistance; Moderate assistance  Comment: Pt utilizes bedrail. Assist for mobilizing LEs to EOB. Step by step verbal cues and tactile cues for sequencing. Transfers  Sit to Stand: Moderate Assistance; Maximum Assistance  Stand to sit: Moderate Assistance  Bed to Chair: Moderate assistance  Comment: Pt requires significant assist to power up to stand as well as step by step cues and physical assist to safely pivot to chair to R side. Ambulation  Ambulation?: Yes  Ambulation 1  Surface: level tile  Device: Rolling Walker  Other Apparatus: O2  Assistance: Moderate assistance; Maximum assistance  Quality of Gait: Continuous lean to R side requiring assist to correct. Poor management of [employer]. Decreased safety awareness.   Distance: 2ft    Stairs/Curb  Stairs?: No         Activity Tolerance  Activity Tolerance: Patient Tolerated treatment well          PT Education  PT Education: PT Role; Plan of Care; Goals    ASSESSMENT:   Body structures, Functions, Activity limitations: Decreased functional mobility ; Decreased strength; Decreased endurance; Decreased coordination; Decreased ADL status; Decreased safe awareness; Decreased vision/visual deficit; Decreased balance  Decision Making: Medium Complexity  History: High  Exam: High  Clinical Presentation: Med    Prognosis: Good  Barriers to Learning: none    DISCHARGE RECOMMENDATIONS:  Discharge Recommendations: Continue to assess pending progress, Patient would benefit from continued therapy after discharge    Assessment: Continued PT indicated to progress mobility and facilitate DC at highest level of indep and safety. REQUIRES PT FOLLOW UP: Yes      PLAN OF CARE:  Plan  Times per week: 3-6  Current Treatment Recommendations: Strengthening, Balance Training, Functional Mobility Training, Transfer Training, Endurance Training, Gait Training, Stair training, Neuromuscular Re-education, Safety Education & Training, Patient/Caregiver Education & Training, Equipment Evaluation, Education, & procurement  Safety Devices  Type of devices: All fall risk precautions in place    Goals:  Long term goals  Long term goal 1: Pt to complete bed mobility with indep  Long term goal 2: Pt to complete transfers with SBA  Long term goal 3: Pt to ambulate 5-15ft with LRD and Yvette  Long term goal 4: Pt to stand at [address] [facility] with SBA X 1min    AMPAC (6 CLICK) Ul[place]ovany Daily 28 Inpatient Mobility Raw Score : 14     Therapy Time:   Individual   Time In [address] [place] [address]   Time Out 0947   Minutes [id number], [place], 11/26/21 at 2:33 PM         Definitions for assistance levels  Independent = pt does not require any physical supervision or assistance from another person for activity completion. Device may be needed.   Stand by assistance = pt requires verbal cues or instructions from another person, close to but not touching, to perform the activity  Minimal assistance= pt performs 75% or more of the activity; assistance is required to complete the activity  Moderate assistance= pt performs 50% of the activity; assistance is required to complete the activity  Maximal assistance = pt performs 25% of the activity; assistance is required to complete the activity  Dependent = pt requires total physical assistance to accomplish the task

## 2021-11-26 NOTE — PROGRESS NOTES
I called his wife Tanisha Avila to discuss goals of care, give her update on his condition, status and the treatment plan. She did not answer the phone. I left a message for her. Specifically I wanted to talk to her about patient's wishes not to proceed with hemodialysis and is interested in having a conversation with hospice care. Subsequently I called his daughter Jovita Baires to discuss goals of care and make her aware of his wishes to have a conversation with hospice. She did not answer the phone.

## 2021-11-26 NOTE — CONSULTS
Palliative Care Consult Note  Patient: Dorma Collet  Gender: male  YOB: 1935  Unit/Bed: X259/Z798-60  CodeStatus: DNR-CCA  Inpatient Treatment Team: Treatment Team: Attending Provider: Damon Gore MD; Consulting Physician: Carmelo Mariano MD; Consulting Physician: Avery Gil MD; Registered Nurse: Micky Sanchez, RN; Registered Nurse: Anthony Alex RN; Utilization Reviewer: Krishna Loera RN  Admit Date:  11/25/2021    Chief Complaint: Judyann Doerun sleeping\"    History of Presenting Illness:      Dorma Collet is a 81 y.o. male on hospital day 1 with a history of HTN, HLD, CHF, cardiomyopathy, CAD s/p CABG x 5, Afib (on [facility]), T2DM, CKD IV (refusing dialysis), left subcortical stroke c/b right sided weakness and dysphagia (recent unremarkable ENT and GI workup), anemia, and functional decline who presented to ED 11/25 by EMS for unresponsiveness in setting of hypoglycemia. Palliative Care consulted for [address], code status discussion, and symptom management. Of note, patient recently admitted 10/27-->11/3/21 for SOB x 2 months 2/2 CHF exacerbation requiring medication changes, dysphagia s/p unremarkable flex laryngoscopy and EGD s/f gastric polyp (bx s/f intestinal metaplasia) with no esophageal pathology, SHANDRA on CKD IV (refusing dialysis), and brain MRI s/f left subcortical stroke causing right sided weakness. Pt has been in and out of the hospital for the past 3 months for similar symptoms. S.  Pt awake, A+Ox3, speech clear, calm. Daughter Kandi Pierce at bedside assisting with interview. Daughter reports that patient was discharged to [facility] ~1 month ago, her family and patient were dissatisfied with the care and brought patient home 11/8. They have been assisting with ADLs and watching the patient. Earlier in the day yesterday, daughter states patient was lethargic, slurring his words, diaphoretic, and confused on who she was. He improved on his own.  Then in the evening he was found unresponsive at home by his wife, so they called 911. She reports providing chest compressions because she wasn't sure if he had a pulse. His blood sugar was low, and once they corrected that, his mentation resolved. During current hospitalization, code status was changed to [facility]. Explained in detail each type of code status. When asked if he would want chest compressions again, patient says no. Pt does not want any aggressive forms of treatment such as dialysis, intubation, compressions, feeding tubes, ICU transfer, or medications to keep him alive. He wants to be at home and proceed with a natural death. He is agreeable to be followed by palliative medicine at home, or if he qualifies for hospice, to be followed with them. He currently complains of difficulty sleeping. Not sure of cause, could be depression and anxiety from losing his independence. He needs assistance with all ADLs except physically feeding himself. Has SOB with movement and is on 4L O2 per NC at home. Does have an appetite, but has difficulty swallowing so he follows a \"soft diet\" and eats primarily mashed potatoes, pudding, and soups at home. Daughter states she feels his appetite has decreased. Denies bowel issues or changes, normal bowel regimen is 1 soft BM daily. Does not use medications PRN, sometimes drinks prune juice is he feels constipated. Urination changes include retention and incontinence, currently has martinez placed that daughter reports might be long term? Denies symptoms of any pain, chest pain, fever like symptoms, or swelling. Is hoping to go home with \"some type of home health care. \"      Review of Systems:       Review of Systems   Constitutional: Positive for activity change. Negative for appetite change (pt reports no appetite changes, daughter says she notices a change), chills, diaphoresis, fatigue, fever and unexpected weight change. HENT: Positive for trouble swallowing (workup completed last admission). Negative for congestion, dental problem and sore throat. Eyes: Negative. Respiratory: Positive for shortness of breath (on exertion). Negative for cough, choking, chest tightness, wheezing and stridor. Cardiovascular: Negative for chest pain, palpitations and leg swelling. Gastrointestinal: Negative for abdominal distention, abdominal pain, blood in stool, constipation, diarrhea, nausea and vomiting. Endocrine: Negative. Genitourinary: Positive for difficulty urinating (martinez cath in place). Negative for flank pain, frequency and hematuria. Musculoskeletal: Positive for gait problem. \"generalized weakness on right side, functional decline\"     Skin: Negative. Allergic/Immunologic: Negative. Neurological: Negative for dizziness, tremors, seizures, syncope, speech difficulty, weakness, light-headedness, numbness and headaches. Hematological: Negative. Psychiatric/Behavioral: Positive for confusion (with hypoglycemic events), dysphoric mood and sleep disturbance. Negative for agitation, decreased concentration, hallucinations and suicidal ideas. The patient is nervous/anxious. Physical Examination:       BP (!) 149/44   Pulse 54   Temp 98.1 °F (36.7 °C) (Oral)   Resp 14   Ht 5' 9.5\" (1.765 m)   Wt 195 lb (88.5 kg)   SpO2 93%   BMI 28.38 kg/m²    Physical Exam  Vitals and nursing note reviewed. Constitutional:       Appearance: Normal appearance. HENT:      Head: Normocephalic. Mouth/Throat:      Mouth: Mucous membranes are moist.   Eyes:      Pupils: Pupils are equal, round, and reactive to light. Cardiovascular:      Rate and Rhythm: Regular rhythm. Bradycardia present. Pulses: Normal pulses. Pulmonary:      Effort: Pulmonary effort is normal. No respiratory distress. Breath sounds: No stridor. No wheezing, rhonchi or rales. Chest:      Chest wall: No tenderness.    Abdominal:      General: Bowel sounds are normal.      Palpations: Abdomen is soft.   Genitourinary:     Comments: Oh catheter to gravity. Urine shawna, cloudy, with sediment  Musculoskeletal:      Cervical back: Normal range of motion. Right lower leg: Edema (non-pitting) present. Left lower leg: Edema (non-pitting) present. Comments: Right upper/lower extremities < Left upper/lower extremities   Skin:     General: Skin is warm and dry. Capillary Refill: Capillary refill takes less than 2 seconds. Neurological:      Mental Status: He is alert and oriented to person, place, and time. Mental status is at baseline. Psychiatric:         Mood and Affect: Mood normal.         Behavior: Behavior normal.         Thought Content:  Thought content normal.         Judgment: Judgment normal.         Allergies:      No Known Allergies    Medications:      Current Facility-Administered Medications   Medication Dose Route Frequency Provider Last Rate Last Admin    sodium chloride flush 0.9 % injection 10 mL  10 mL IntraVENous 2 times per day Meghann Mensah MD   10 mL at 11/25/21 2126    sodium chloride flush 0.9 % injection 10 mL  10 mL IntraVENous PRN Shikha Dickson MD        0.9 % sodium chloride infusion  25 mL IntraVENous PRN Shikha Dickson MD        promethazine (PHENERGAN) tablet 12.5 mg  12.5 mg Oral Q6H PRN Shikha Dickson MD        Or    ondansetron (ZOFRAN) injection 4 mg  4 mg IntraVENous Q6H PRN Shikha Dickson MD        polyethylene glycol (GLYCOLAX) packet 17 g  17 g Oral Daily PRN Meghann Mensah MD        acetaminophen (TYLENOL) tablet 650 mg  650 mg Oral Q6H PRN Shikha Dickson MD        Or    acetaminophen (TYLENOL) suppository 650 mg  650 mg Rectal Q6H PRN Meghann Mensah MD        [Held by provider] amiodarone (CORDARONE) tablet 200 mg  200 mg Oral Daily Shikha Dickson MD        apixaban (ELIQUIS) tablet 2.5 mg  2.5 mg Oral BID Shikha Dickson MD   2.5 mg at 11/26/21 0918    atorvastatin (LIPITOR) tablet 20 mg  20 mg Oral Nightly Shikha Dickson MD   20 mg at 11/25/21 2126    hydrALAZINE (APRESOLINE) tablet 50 mg  50 mg Oral 3 times per day Meghnan Mensah MD   50 mg at 11/26/21 0552    insulin lispro (HUMALOG) injection vial 0-6 Units  0-6 Units SubCUTAneous TID WC Shikha Dickson MD        insulin lispro (HUMALOG) injection vial 0-3 Units  0-3 Units SubCUTAneous Nightly Shikha Dickson MD        glucose (GLUTOSE) 40 % oral gel 15 g  15 g Oral PRN Shikha Dickson MD        dextrose 50 % IV solution  12.5 g IntraVENous PRN Meghann Mensah MD   12.5 g at 11/26/21 0912    glucagon (rDNA) injection 1 mg  1 mg IntraMUSCular PRN Shikha Dickson MD        dextrose 5 % solution  100 mL/hr IntraVENous PRN Shikha Dickson MD        dextrose 10 % infusion   IntraVENous Continuous Shikha Dickson MD 50 mL/hr at 11/25/21 1518 New Bag at 11/25/21 1518    furosemide (LASIX) injection 40 mg  40 mg IntraVENous Daily Rebecca Vazquez MD   40 mg at 11/26/21 0918    amLODIPine (NORVASC) tablet 5 mg  5 mg Oral Daily Rebecca Vazquez MD   5 mg at 11/26/21 7739       History:       PMHx:  Past Medical History:   Diagnosis Date    CAD (coronary artery disease)     Hyperlipidemia        PSHx:  Past Surgical History:   Procedure Laterality Date    CARDIAC SURGERY      UPPER GASTROINTESTINAL ENDOSCOPY N/A 10/29/2021    EGD BIOPSY performed by Channing Tang MD at [facility] Hx:  Social History     Socioeconomic History    Marital status:      Spouse name: None    Number of children: None    Years of education: None    Highest education level: None   Occupational History    None   Tobacco Use    Smoking status: Never Smoker    Smokeless tobacco: Never Used   Vaping Use    Vaping Use: Never used   Substance and Sexual Activity    Alcohol use: Not Currently    Drug use: Not Currently    Sexual activity: Not Currently   Other Topics Concern    None   Social History Narrative    None     Social Determinants of Health     Financial Resource Strain:     Difficulty of Paying Living Expenses: Not on file   Food Insecurity:     Worried About Running Out of Food in the Last Year: Not on file    Ran Out of Food in the Last Year: Not on file   Transportation Needs:     Lack of Transportation (Medical): Not on file    Lack of Transportation (Non-Medical): Not on file   Physical Activity:     Days of Exercise per Week: Not on file    Minutes of Exercise per Session: Not on file   Stress:     Feeling of Stress : Not on file   Social Connections:     Frequency of Communication with Friends and Family: Not on file    Frequency of Social Gatherings with Friends and Family: Not on file    Attends Sabianism Services: Not on file    Active Member of [address] [place] [address] [employer] or Organizations: Not on file    Attends Club or Organization Meetings: Not on file    Marital Status: Not on file   Intimate Partner Violence:     Fear of Current or Ex-Partner: Not on file    Emotionally Abused: Not on file    Physically Abused: Not on file    Sexually Abused: Not on file   Housing Stability:     Unable to Pay for Housing in the Last Year: Not on file    Number of [place] in the Last Year: Not on file    Unstable Housing in the Last Year: Not on file       Family Hx:  History reviewed. No pertinent family history.     LABS: Reviewed     CBC:  Lab Results   Component Value Date    WBC 4.7 11/25/2021    RBC 2.98 11/25/2021    HGB 9.5 11/25/2021    HCT 28.0 11/25/2021    MCV 93.9 11/25/2021    MCH 31.9 11/25/2021    MCHC 33.9 11/25/2021    RDW 15.0 11/25/2021     11/25/2021     CBC with Differential:   Lab Results   Component Value Date    WBC 4.7 11/25/2021    RBC 2.98 11/25/2021    HGB 9.5 11/25/2021    HCT 28.0 11/25/2021     11/25/2021    MCV 93.9 11/25/2021    MCH 31.9 11/25/2021    MCHC 33.9 11/25/2021    RDW 15.0 11/25/2021    LYMPHOPCT 12.4 11/25/2021    MONOPCT 4.9 11/25/2021    BASOPCT 0.3 11/25/2021    MONOSABS 0.2 11/25/2021    LYMPHSABS 0.6 11/25/2021    EOSABS 0.1 11/25/2021    BASOSABS 0.0 11/25/2021     CMP:    Lab Results   Component Value Date     11/25/2021    K 5.1 11/25/2021    CL 97 11/25/2021    CO2 29 11/25/2021    BUN 60 11/25/2021    CREATININE 7.33 11/25/2021    GFRAA 8.6 11/25/2021    LABGLOM 7.1 11/25/2021    GLUCOSE 124 11/25/2021    GLUCOSE 228 06/15/2021    PROT 6.7 11/25/2021    LABALBU 3.2 11/25/2021    LABALBU 3.3 06/15/2021    CALCIUM 8.5 11/25/2021    BILITOT 0.8 11/25/2021    ALKPHOS 106 11/25/2021    AST 8 11/25/2021    ALT 6 11/25/2021     BMP:    Lab Results   Component Value Date     11/25/2021    K 5.1 11/25/2021    CL 97 11/25/2021    CO2 29 11/25/2021    BUN 60 11/25/2021    LABALBU 3.2 11/25/2021    LABALBU 3.3 06/15/2021    CREATININE 7.33 11/25/2021    CALCIUM 8.5 11/25/2021    GFRAA 8.6 11/25/2021    LABGLOM 7.1 11/25/2021    GLUCOSE 124 11/25/2021    GLUCOSE 228 06/15/2021     TSH:   Lab Results   Component Value Date    TSH 5.54 06/15/2021     Vitamin B12 and Folate: No components found for: FOLIC,  O00  Urinalysis:   Lab Results   Component Value Date    NITRU Negative 11/25/2021    WBCUA 3-5 11/25/2021    WBCUA 2 03/25/2021    BACTERIA Negative 11/25/2021    RBCUA None seen 11/25/2021    RBCUA 1 03/25/2021    BLOODU Negative 11/25/2021    SPECGRAV 1.014 11/25/2021    GLUCOSEU Negative 11/25/2021           FUNCTIONAL ADL´S:     Independent: [X] Eating, [   ] Dressing, [   ] Transferring, [   ] Dot Leyden, [   ] Bathing, [   ] Continence  Dependent   : [  ] Eating, [   ] Dressing, [   ] Transferring, [   ] Dot Leyden, [   ] Skylar Kast, [ Bhupendra File Urinary Continence  W. assistant : [  ] Eating, [X] Dressing, [X] Transferring, [X] Toileting, Origen.Coil ] Bathing, [ X ] Bowel Continence    Radiology: Reviewed      CT Head WO Contrast    Result Date: 11/25/2021  EXAMINATION:  CT HEAD WO CONTRAST HISTORY:  Altered mental status TECHNIQUE:  Serial axial images without IV contrast were obtained from the vertex to the foramen magnum.  Sagittal and coronal reconstructions. All CT scans at this facility use dose modulation, iterative reconstruction, and/or weight based dosing when appropriate to reduce radiation dose to as low as reasonably achievable. COMPARISON:  MRI brain 11/3/2021 RESULT: Acute change:   No evidence of an acute infarct or other acute parenchymal process. Hemorrhage:    No evidence of acute intracranial hemorrhage. Mass Lesion / Mass Effect:   There is no evidence of an intracranial mass or extraaxial fluid collection. No significant mass effect. Chronic change:   Patchy foci of  low attenuation coefficient are present within the supratentorial white matter which is a nonspecific finding but likely represents moderate microvascular ischemia. 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 Parenchyma: There is moderate generalized volume loss. Ventricles:   Ventricular enlargement concordant with the degree of parenchymal volume loss. Paranasal sinuses and skull base: There is partial opacification of the right maxillary sinus. Mastoid air cells are clear. The skull base is unremarkable. Soft tissues unremarkable. No acute intracranial process or significant interval change from prior. Chronic microvascular ischemic changes and remote infarcts. XR CHEST PORTABLE    Result Date: 11/25/2021  EXAMINATION:  CHEST RADIOGRAPH (PORTABLE SINGLE AP VIEW) Exam Date/Time:  11/25/2021 8:06 AM Clinical History:   Hypoglycemia Comparison:  Chest radiograph 11/20/2021  FINDINGS: Lines, tubes, and devices:  Sternotomy wires. Cardiomediastinal silhouette:  Stable cardiomegaly. There is central venous congestion. Lungs and pleura: There are bibasilar predominant parenchymal opacities greater on the left. Bilateral pleural effusions, small right and moderate left. No pneumothorax. Cardiomegaly with pulmonary edema and bilateral pleural effusions. Assessment and plan:  1.  Palliative Care encounter  - Explained the role of palliative care in treating patients. Discussed symptom management related to chronic diseases/conditions. Provided emotional support and active listening. Patient understands and is agreeable to current plan. 2. Advance Care Planning  - Discussed goals of care with patient. Explained in extensive detail nuances between full code, DNR CCA and DNR CC. Patient has made the decision to be DNR CC. Code status order changed in [address] Daughter Jovita Baires at bedside during discussion.  - Has living will and HPOA. POA is spouse Tanisha Avila. 3. Goals of Care Discussion:  - Disease process and goals of treatment were discussed in basic terms. Klaus's goal is to optimize available comfort care measures and sign with Hospice Services. We discussed the palliative care philosophy in light of those goals. We discussed all care options contingent on treatment response and QOL. Much active listening, presence, and emotional support were given. 4. Insomnia likely in setting of anxiety/depression from functional decline  - Started Zoloft 25 mg PO daily    5. Encounter for Hospice discussion  - Hospice consult ordered. Patient eligible for hospice services at this time due to end-stage renal disease not wanting HD and functional decline  - PPS 40%    Patient is being treated for multiple medical conditions this admission includin. SHANDRA on CKD IV, hyperkalemia, hyperphosphatemia- refusing dialysis  2. Hypoglycemia  3. CHF, cardiomyopathy, acute hypoxic respiratory failure  4. pAfib/bradycardia  5. Chronic anemia  6. Functional disability    I spent 55 minutes with >50% counseling/care coordination.     Electronically signed by PRINCESS Pacheco CNP on 2021 at 9:21 AM

## 2021-11-26 NOTE — PROGRESS NOTES
Assessment completed and charted on by this RN. A&Ox4. Patient`s blood sugar was 50. An amp of D50 PRN per [facility] was given. Patient is asymptomatic of symptoms. Patient is on 3 L NC oxygen. Patient denies further needs at this time. 1030: Patient blood sugar was 59 after 1 hour check. The other half of the amp of D50 was given. At 1116, blood sugar was 80. Dr. Prashanth Fontana was notified. No new orders at this time. 1638: Patient blood sugar was 42. Patient given D50. Dr. Prashanth Fontana notified. No new orders at this time. 1718: Blood sugar re check was 108.

## 2021-11-27 LAB
ANION GAP SERPL CALCULATED.3IONS-SCNC: 11 MEQ/L (ref 9–15)
BASOPHILS ABSOLUTE: 0 K/UL (ref 0–0.2)
BASOPHILS RELATIVE PERCENT: 0.4 %
BUN BLDV-MCNC: 53 MG/DL (ref 8–23)
CALCIUM SERPL-MCNC: 8 MG/DL (ref 8.5–9.9)
CHLORIDE BLD-SCNC: 98 MEQ/L (ref 95–107)
CO2: 27 MEQ/L (ref 20–31)
CREAT SERPL-MCNC: 6.51 MG/DL (ref 0.7–1.2)
EOSINOPHILS ABSOLUTE: 0.1 K/UL (ref 0–0.7)
EOSINOPHILS RELATIVE PERCENT: 2.3 %
GFR AFRICAN AMERICAN: 9.9
GFR NON-AFRICAN AMERICAN: 8.2
GLUCOSE BLD-MCNC: 106 MG/DL (ref 60–115)
GLUCOSE BLD-MCNC: 107 MG/DL (ref 60–115)
GLUCOSE BLD-MCNC: 113 MG/DL (ref 60–115)
GLUCOSE BLD-MCNC: 37 MG/DL (ref 70–99)
GLUCOSE BLD-MCNC: 41 MG/DL (ref 60–115)
GLUCOSE BLD-MCNC: 53 MG/DL (ref 60–115)
GLUCOSE BLD-MCNC: 76 MG/DL (ref 60–115)
HCT VFR BLD CALC: 26 % (ref 42–52)
HEMOGLOBIN: 8.6 G/DL (ref 14–18)
LYMPHOCYTES ABSOLUTE: 0.9 K/UL (ref 1–4.8)
LYMPHOCYTES RELATIVE PERCENT: 15.2 %
MCH RBC QN AUTO: 31.4 PG (ref 27–31.3)
MCHC RBC AUTO-ENTMCNC: 33 % (ref 33–37)
MCV RBC AUTO: 95 FL (ref 80–100)
MONOCYTES ABSOLUTE: 0.5 K/UL (ref 0.2–0.8)
MONOCYTES RELATIVE PERCENT: 8.4 %
NEUTROPHILS ABSOLUTE: 4.5 K/UL (ref 1.4–6.5)
NEUTROPHILS RELATIVE PERCENT: 73.7 %
PDW BLD-RTO: 15.2 % (ref 11.5–14.5)
PERFORMED ON: ABNORMAL
PERFORMED ON: ABNORMAL
PERFORMED ON: NORMAL
PLATELET # BLD: 137 K/UL (ref 130–400)
POTASSIUM SERPL-SCNC: 4.5 MEQ/L (ref 3.4–4.9)
RBC # BLD: 2.73 M/UL (ref 4.7–6.1)
SODIUM BLD-SCNC: 136 MEQ/L (ref 135–144)
WBC # BLD: 6.1 K/UL (ref 4.8–10.8)

## 2021-11-27 PROCEDURE — 83550 IRON BINDING TEST: CPT

## 2021-11-27 PROCEDURE — 85025 COMPLETE CBC W/AUTO DIFF WBC: CPT

## 2021-11-27 PROCEDURE — 6360000002 HC RX W HCPCS: Performed by: INTERNAL MEDICINE

## 2021-11-27 PROCEDURE — 2580000003 HC RX 258: Performed by: INTERNAL MEDICINE

## 2021-11-27 PROCEDURE — 1210000000 HC MED SURG R&B

## 2021-11-27 PROCEDURE — 80048 BASIC METABOLIC PNL TOTAL CA: CPT

## 2021-11-27 PROCEDURE — 2700000000 HC OXYGEN THERAPY PER DAY

## 2021-11-27 PROCEDURE — 82728 ASSAY OF FERRITIN: CPT

## 2021-11-27 PROCEDURE — 2500000003 HC RX 250 WO HCPCS: Performed by: INTERNAL MEDICINE

## 2021-11-27 PROCEDURE — 6370000000 HC RX 637 (ALT 250 FOR IP): Performed by: INTERNAL MEDICINE

## 2021-11-27 PROCEDURE — 83540 ASSAY OF IRON: CPT

## 2021-11-27 PROCEDURE — 36415 COLL VENOUS BLD VENIPUNCTURE: CPT

## 2021-11-27 RX ORDER — DEXTROSE MONOHYDRATE 100 MG/ML
INJECTION, SOLUTION INTRAVENOUS CONTINUOUS
Status: DISCONTINUED | OUTPATIENT
Start: 2021-11-27 | End: 2021-11-28 | Stop reason: HOSPADM

## 2021-11-27 RX ORDER — MORPHINE SULFATE 2 MG/ML
1 INJECTION, SOLUTION INTRAMUSCULAR; INTRAVENOUS
Status: DISCONTINUED | OUTPATIENT
Start: 2021-11-27 | End: 2021-11-28 | Stop reason: HOSPADM

## 2021-11-27 RX ADMIN — DEXTROSE MONOHYDRATE: 100 INJECTION, SOLUTION INTRAVENOUS at 12:15

## 2021-11-27 RX ADMIN — Medication 10 ML: at 11:21

## 2021-11-27 RX ADMIN — HYDRALAZINE HYDROCHLORIDE 50 MG: 50 TABLET, FILM COATED ORAL at 15:31

## 2021-11-27 RX ADMIN — FAMOTIDINE 10 MG: 10 INJECTION, SOLUTION INTRAVENOUS at 20:24

## 2021-11-27 RX ADMIN — ONDANSETRON 4 MG: 2 INJECTION INTRAMUSCULAR; INTRAVENOUS at 05:28

## 2021-11-27 RX ADMIN — APIXABAN 2.5 MG: 2.5 TABLET, FILM COATED ORAL at 20:25

## 2021-11-27 RX ADMIN — FUROSEMIDE 40 MG: 10 INJECTION, SOLUTION INTRAMUSCULAR; INTRAVENOUS at 11:21

## 2021-11-27 RX ADMIN — DEXTROSE MONOHYDRATE 12.5 G: 25 INJECTION, SOLUTION INTRAVENOUS at 11:39

## 2021-11-27 RX ADMIN — Medication 10 ML: at 20:25

## 2021-11-27 ASSESSMENT — PAIN SCALES - GENERAL
PAINLEVEL_OUTOF10: 0

## 2021-11-27 NOTE — PROGRESS NOTES
[facility] Occupational Therapy      Date: 2021  Patient Name: Fernanda Steiner        MRN: [id number]  Account: [de-identified]   : 1935  (81 y.o.)  Room: [place] [id number]    Chart reviewed, attempted OT at 11:46 a.m. for eval. Patient not seen 2° to:    Pt being d/c home with hospice. OT eval not completed at this time.       Electronically signed by ELÍAS Donohue on 2021 at 11:46 AM

## 2021-11-27 NOTE — PROGRESS NOTES
Renal Progress Note  Assessment/  81years old male presented with generalized weakness of acute onset and progressive course with shortness of breath admitted with hypoglycemia acute kidney injury on top of chronic kidney disease associated with electrolyte imbalance in the form of hyperkalemia  Patient does carry the chronic comorbidities of diabetes chronic kidney disease stage III-IV patient was recently admitted to our facility and  has been involved in the patient care 3 weeks ago when discharge was okay to follow-up in outpatient basis  -Uncontrolled systolic hypertension observe stretch of bradycardia potassium was five-point 6 repeat at 5.1 currently potassium is 4.8  -No acid-base imbalance  -Anemia 9.5 hemoglobin  -BUN and creatinine 60 and 7.3 compared to a creatinine of 4 on patient was previously hospitalized     Acute kidney injury versus end-stage renal disease patient is nonoliguric nonpolyuric worsening GFR with net negative fluid balance underlying etiology of the acute kidney injury could be secondary to aggravated renal insult with prerenal azotemia     Plan/  1-control the blood pressure using calcium channel blocker better controlled however will require amlodipine 5 mg twice daily  2-IV fluid x1 L  will continue IV fluid  3-anemia requiring iron plus or minus JOSE DAVID we will start with iron in the form of Venofer IV    Patient Active Problem List:     CHF (congestive heart failure), NYHA class I, acute on chronic, combined (HCC)     Esophageal dysphagia     SHANDRA (acute kidney injury) ([facility] [address]     Anxiety and depression     Insomnia     Goals of care, counseling/discussion     Advanced care planning/counseling discussion     Palliative care encounter      Subjective:   Admit Date: 11/25/2021    Interval History: Alert follow command apart from being weak lethargic tired with no appetite no any other uremic related or fluid volume overload related symptoms    Medications:   Scheduled Meds:   famotidine (PEPCID) injection  10 mg IntraVENous Daily    sodium chloride flush  10 mL IntraVENous 2 times per day    [Held by provider] amiodarone  200 mg Oral Daily    apixaban  2.5 mg Oral BID    hydrALAZINE  50 mg Oral 3 times per day    insulin lispro  0-6 Units SubCUTAneous TID     insulin lispro  0-3 Units SubCUTAneous Nightly    furosemide  40 mg IntraVENous Daily    amLODIPine  5 mg Oral Daily     Continuous Infusions:   dextrose 65 mL/hr at 11/27/21 1215    sodium chloride      dextrose         CBC:   Recent Labs     11/26/21  0849 11/27/21  0733   WBC 6.0 6.1   HGB 8.5* 8.6*    137     CMP:    Recent Labs     11/25/21  0730 11/25/21  0730 11/25/21  1513 11/26/21  0849 11/27/21  0733     --   --  138 136   K 5.6*   < > 5.1* 4.8 4.5   CL 97  --   --  100 98   CO2 29  --   --  27 27   BUN 60*  --   --  57* 53*   CREATININE 7.33*  --   --  7.38* 6.51*   GLUCOSE 124*  --   --  47* 37*   CALCIUM 8.5  --   --  7.9* 8.0*   LABGLOM 7.1*  --   --  7.1* 8.2*    < > = values in this interval not displayed. Troponin:   Recent Labs     11/25/21  1938   TROPONINI 0.020*     BNP: No results for input(s): BNP in the last 72 hours. INR: No results for input(s): INR in the last 72 hours. Lipids: No results for input(s): CHOL, LDLDIRECT, TRIG, HDL, AMYLASE, LIPASE in the last 72 hours. Liver:   Recent Labs     11/25/21  0730   AST 8   ALT 6   ALKPHOS 106*   PROT 6.7   LABALBU 3.2*   BILITOT 0.8*     Iron:    Recent Labs     11/25/21  1513   FERRITIN 71.9     Urinalysis: No results for input(s): UA in the last 72 hours.     Objective:   Vitals: BP (!) 146/48   Pulse 63   Temp 98.1 °F (36.7 °C) (Oral)   Resp 22   Ht 5' 9.5\" (1.765 m)   Wt 195 lb (88.5 kg)   SpO2 95%   BMI 28.38 kg/m²    Wt Readings from Last 3 Encounters:   11/25/21 195 lb (88.5 kg)   11/20/21 205 lb (93 kg)   11/12/21 215 lb (97.5 kg)      24HR INTAKE/OUTPUT:      Intake/Output Summary (Last 24 hours) at 11/27/2021

## 2021-11-27 NOTE — CARE COORDINATION
MET WITH PATIENT AND DTR MANOJ, FREEDOM OF CHOICE OFFERED. PATIENT STATES PLAN IS TO DC TO HOME WITH [facility] . DTR AGREES AND CALL PLACED TO [place] WITH [facility]. PLAN FOR MEETING TODAY.

## 2021-11-27 NOTE — CARE COORDINATION
[facility] -     Referral meeting with patient, spouse, and 2 daughters. Discussed hospice philosophy. Patient appropriate for hospice. Consents signed by spouse, Funmi Hartmann. Plan for equipment to be delivered to patient's home 11/28/21 ~1000 and discharge home ~1500.

## 2021-11-27 NOTE — PROGRESS NOTES
Patient is nauseous and vomiting. Patient is a sleeping. I could not arouse him with verbal stimulation. Less short of breath than the previously noted. Exam: General appearance: Patient is sleeping, patient has emesis basin next to him. Patient is unarousable with verbal stimulation. I did not cause any painful stimulation.  Patient is morbidly obese. Skin: Skin color, texture, turgor normal. No rashes or lesions  HEENT: Head: Normocephalic, no lesions, without obvious abnormality. Neck: no adenopathy, no carotid bruit, no JVD, supple, symmetrical, trachea midline and thyroid not enlarged, symmetric, no tenderness/mass/nodules  Lungs: Bilateral diminished breath sound. Heart: Patient is bradycardic in the 50s. Abdomen: soft, non-tender; bowel sounds normal; no masses,  no organomegaly  Extremities: extremities normal, atraumatic, no cyanosis or edema  Neurologic: Mental status: Patient is lethargic today. He is not able/unwilling to open his eyes.     Assessment and plan:      *Hypoglycemia.  Most likely secondary to the use of a sulfonylurea in the setting of a progressive renal failure.  Hold sulfonylurea and Januvia at this time. Patient has been on D10 since yesterday. His blood sugar continues to be less than 80. Continue D10 at this time.     *Progressive renal failure associated with the fluid overload, respiratory distress and pleural effusion. Suspect cardiorenal syndrome.      *Acute/subacute chronic systolic and diastolic heart failure.    *Cardiomyopathy.  Ejection fraction 45%.     *Acute hypoxic respiratory failure secondary to fluid overload and worsening pleural effusion.     *Hyperkalemia.  This was treated in the emergency major m.  Repeat potassium level.   Repeat potassium level is 4.8.     *  Anemia, chronic, no evidence of acute blood loss.  Probably anemia of chronic disease secondary to progressive renal failure.      *Functional disability.  Patient is unable to sit up or stand up on his own.     *Paroxysmal A. fib.  Patient is on amiodarone and Eliquis. Patient is bradycardic.  Hold beta-blocker.  Hold amiodarone for 24 to 48 hours. PPS 20%. THORNTON score 0/6. Fast score is 7E. Patient had decided for hospice. His family is on board with hospice. I had a long discussion with his daughter yesterday on the phone and today in person at the bedside about his case.   Everyone is comfortable with comfort care approach and hospice program.

## 2021-11-28 VITALS
TEMPERATURE: 98.2 F | DIASTOLIC BLOOD PRESSURE: 46 MMHG | HEART RATE: 70 BPM | BODY MASS INDEX: 27.92 KG/M2 | OXYGEN SATURATION: 94 % | HEIGHT: 70 IN | WEIGHT: 195 LBS | SYSTOLIC BLOOD PRESSURE: 154 MMHG | RESPIRATION RATE: 20 BRPM

## 2021-11-28 LAB
ANION GAP SERPL CALCULATED.3IONS-SCNC: 12 MEQ/L (ref 9–15)
BASOPHILS ABSOLUTE: 0 K/UL (ref 0–0.2)
BASOPHILS RELATIVE PERCENT: 0.6 %
BUN BLDV-MCNC: 51 MG/DL (ref 8–23)
CALCIUM SERPL-MCNC: 8.1 MG/DL (ref 8.5–9.9)
CHLORIDE BLD-SCNC: 96 MEQ/L (ref 95–107)
CO2: 25 MEQ/L (ref 20–31)
CREAT SERPL-MCNC: 6.14 MG/DL (ref 0.7–1.2)
EOSINOPHILS ABSOLUTE: 0.1 K/UL (ref 0–0.7)
EOSINOPHILS RELATIVE PERCENT: 1.8 %
FERRITIN: 143.6 NG/ML (ref 30–400)
GFR AFRICAN AMERICAN: 10.6
GFR NON-AFRICAN AMERICAN: 8.7
GLUCOSE BLD-MCNC: 101 MG/DL (ref 60–115)
GLUCOSE BLD-MCNC: 82 MG/DL (ref 70–99)
GLUCOSE BLD-MCNC: 94 MG/DL (ref 60–115)
HCT VFR BLD CALC: 25.3 % (ref 42–52)
HEMOGLOBIN: 8.5 G/DL (ref 14–18)
LYMPHOCYTES ABSOLUTE: 0.8 K/UL (ref 1–4.8)
LYMPHOCYTES RELATIVE PERCENT: 14.3 %
MCH RBC QN AUTO: 31.9 PG (ref 27–31.3)
MCHC RBC AUTO-ENTMCNC: 33.5 % (ref 33–37)
MCV RBC AUTO: 95.2 FL (ref 80–100)
MONOCYTES ABSOLUTE: 0.5 K/UL (ref 0.2–0.8)
MONOCYTES RELATIVE PERCENT: 8.9 %
NEUTROPHILS ABSOLUTE: 4.2 K/UL (ref 1.4–6.5)
NEUTROPHILS RELATIVE PERCENT: 74.4 %
PDW BLD-RTO: 14.8 % (ref 11.5–14.5)
PERFORMED ON: NORMAL
PERFORMED ON: NORMAL
PLATELET # BLD: 124 K/UL (ref 130–400)
POTASSIUM SERPL-SCNC: 4.8 MEQ/L (ref 3.4–4.9)
RBC # BLD: 2.66 M/UL (ref 4.7–6.1)
SODIUM BLD-SCNC: 133 MEQ/L (ref 135–144)
WBC # BLD: 5.6 K/UL (ref 4.8–10.8)

## 2021-11-28 PROCEDURE — 85025 COMPLETE CBC W/AUTO DIFF WBC: CPT

## 2021-11-28 PROCEDURE — 6360000002 HC RX W HCPCS: Performed by: INTERNAL MEDICINE

## 2021-11-28 PROCEDURE — 80048 BASIC METABOLIC PNL TOTAL CA: CPT

## 2021-11-28 PROCEDURE — 36415 COLL VENOUS BLD VENIPUNCTURE: CPT

## 2021-11-28 PROCEDURE — 2500000003 HC RX 250 WO HCPCS: Performed by: INTERNAL MEDICINE

## 2021-11-28 PROCEDURE — 2700000000 HC OXYGEN THERAPY PER DAY

## 2021-11-28 PROCEDURE — 6370000000 HC RX 637 (ALT 250 FOR IP): Performed by: INTERNAL MEDICINE

## 2021-11-28 PROCEDURE — 2580000003 HC RX 258: Performed by: INTERNAL MEDICINE

## 2021-11-28 RX ORDER — CARVEDILOL 6.25 MG/1
6.25 TABLET ORAL 2 TIMES DAILY
Qty: 60 TABLET | Refills: 3 | Status: SHIPPED | OUTPATIENT
Start: 2021-11-28

## 2021-11-28 RX ORDER — AMIODARONE HYDROCHLORIDE 200 MG/1
200 TABLET ORAL DAILY
Qty: 30 TABLET | Refills: 3 | Status: SHIPPED | OUTPATIENT
Start: 2021-11-28

## 2021-11-28 RX ORDER — FUROSEMIDE 40 MG/1
40 TABLET ORAL 2 TIMES DAILY
Qty: 60 TABLET | Refills: 3 | Status: SHIPPED | OUTPATIENT
Start: 2021-11-28

## 2021-11-28 RX ORDER — FAMOTIDINE 20 MG/1
10 TABLET, FILM COATED ORAL DAILY
Status: DISCONTINUED | OUTPATIENT
Start: 2021-11-28 | End: 2021-11-28 | Stop reason: HOSPADM

## 2021-11-28 RX ORDER — FUROSEMIDE 40 MG/1
40 TABLET ORAL DAILY
Status: DISCONTINUED | OUTPATIENT
Start: 2021-11-28 | End: 2021-11-28 | Stop reason: HOSPADM

## 2021-11-28 RX ORDER — FAMOTIDINE 10 MG
10 TABLET ORAL DAILY
Qty: 30 TABLET | Refills: 3 | Status: SHIPPED | OUTPATIENT
Start: 2021-11-28

## 2021-11-28 RX ADMIN — HYDRALAZINE HYDROCHLORIDE 50 MG: 50 TABLET, FILM COATED ORAL at 06:04

## 2021-11-28 ASSESSMENT — PAIN SCALES - GENERAL: PAINLEVEL_OUTOF10: 0

## 2021-11-28 NOTE — CARE COORDINATION
SPOKE WITH MIREYA WITH [facility] TO CONFIRM TRANSPORTATION ARRANGED FOR 3PM TODAY TO HOME WITH HOSPICE., NURSE AWARE

## 2021-11-28 NOTE — PROGRESS NOTES
Assessment completed this shift. ALert and oriented x4. Falls back to sleep quickly. BG running low today41,76,53 on 50 mL D10. Dr Gia Koch notified . Rate increased to 65 mL/hr and 12.5 g D50 given as ordered for low glucose. Repeat BG =106. . Will continue to monitor.   Electronically signed by Cecilia Kimble RN on 11/27/2021 at 7:13 PM

## 2021-11-28 NOTE — DISCHARGE INSTR - COC
Continuity of Care Form    Patient Name: Jacqueline Mauricio   :  1935  MRN:  [id number]    Admit date:  2021  Discharge date:  ***    Code Status Order: DNR-CC   Advance Directives:      Admitting Physician:  Omer Vee MD  PCP: Hernan Cruz DO    Discharging Nurse: [facility] Unit/Room#: S250/Q050-36  Discharging Unit Phone Number: ***    Emergency Contact:   Extended Emergency Contact Information  Primary Emergency Contact: [address] , [address] Phone: [phone]  Relation: Spouse   needed? No  Secondary Emergency ContactLyn Shannan  Mobile Phone: [phone]  Relation: Child    Past Surgical History:  Past Surgical History:   Procedure Laterality Date    CARDIAC SURGERY      UPPER GASTROINTESTINAL ENDOSCOPY N/A 10/29/2021    EGD BIOPSY performed by Refugio Roldan MD at [facility]       Immunization History: There is no immunization history on file for this patient. Active Problems:  Patient Active Problem List   Diagnosis Code    CHF (congestive heart failure), NYHA class I, acute on chronic, combined ([facility]) I50.43    Esophageal dysphagia R13.19    SHANDRA (acute kidney injury) ([facility] [address] N17.9    Anxiety and depression F41.9, F32. A    Insomnia G47.00    Goals of care, counseling/discussion Z71.89    Advanced care planning/counseling discussion Z71.89    Palliative care encounter Z51.5       Isolation/Infection:   Isolation            No Isolation          Patient Infection Status       Infection Onset Added Last Indicated Last Indicated By Review Planned Expiration Resolved Resolved By    None active    Resolved    COVID-19 (Rule Out) 10/27/21 10/27/21 10/27/21 COVID-19, Rapid (Ordered)   10/27/21 Rule-Out Test Resulted            Nurse Assessment:  Last Vital Signs: BP (!) 154/46   Pulse 70   Temp 98.2 °F (36.8 °C) (Oral)   Resp 20   Ht 5' 9.5\" (1.765 m)   Wt 195 lb (88.5 kg)   SpO2 94%   BMI 28.38 kg/m²     Last documented pain score (0-10 scale): Pain Level: 0  Last Weight:   Wt Readings from Last 1 Encounters:   21 195 lb (88.5 kg)     Mental Status:  {IP PT MENTAL STATUS:}    IV Access:  { DHAVAL IV ACCESS:797089436}    Nursing Mobility/ADLs:  Walking   {CHP DME LOJP:402432236}  Transfer  {CHP DME WHYK:954257838}  Bathing  {CHP DME PQUZ:910948846}  Dressing  {CHP DME SAWL:197549400}  Toileting  {CHP DME FBAU:116889400}  Feeding  {CHP DME VFPO:088314739}  Med Admin  {P DME FPXC:913726331}  Med Delivery   { DHAVAL MED Delivery:439416981}    Wound Care Documentation and Therapy:        Elimination:  Continence: Bowel: {YES / CAMILO:36869}  Bladder: {YES / CU:12192}  Urinary Catheter: {Urinary Catheter:686939897}   Colostomy/Ileostomy/Ileal Conduit: {YES / E}       Date of Last BM: ***    Intake/Output Summary (Last 24 hours) at 2021 1345  Last data filed at 2021 0858  Gross per 24 hour   Intake 1392 ml   Output 450 ml   Net 942 ml     I/O last 3 completed shifts:   In: 5808 [I.V.:1392]  Out: 300 [Urine:300]    Safety Concerns:     [address] Safety Concerns:533006220}    Impairments/Disabilities:      [address] Impairments/Disabilities:631577270}    Nutrition Therapy:  Current Nutrition Therapy:   [address] Diet List:236812765}    Routes of Feeding: {P DME Other Feedings:441176203}  Liquids: {Slp liquid thickness:52146}  Daily Fluid Restriction: {CHP DME Yes amt example:464773050}  Last Modified Barium Swallow with Video (Video Swallowing Test): {Done Not Done IHUJ:052585652}    Treatments at the Time of Hospital Discharge:   Respiratory Treatments: ***  Oxygen Therapy:  {Therapy; copd oxygen:23305}  Ventilator:    { CC Vent GNDP:913657536}    Rehab Therapies: {THERAPEUTIC INTERVENTION:0868027105}  Weight Bearing Status/Restrictions: [address]  Weight Bearin}  Other Medical Equipment (for information only, NOT a DME order):  {EQUIPMENT:990972186}  Other Treatments: ***    Patient's personal belongings (please select all that are sent with patient):  {CHP DME Belongings:554785161}    RN SIGNATURE:  {Esignature:204992757}    CASE MANAGEMENT/SOCIAL WORK SECTION    Inpatient Status Date: ***    Readmission Risk Assessment Score:  Readmission Risk              Risk of Unplanned Readmission:  25           Discharging to Facility/ Agency   Name:   Address:  Phone:  Fax:    Dialysis Facility (if applicable)   Name:  Address:  Dialysis Schedule:  Phone:  Fax:    / signature: {Esignature:032075722}    PHYSICIAN SECTION    Prognosis: {Prognosis:1682433299}    Condition at Discharge: [address] [place] [address] Patient Condition:939225499}    Rehab Potential (if transferring to Rehab): {Prognosis:2760097496}    Recommended Labs or Other Treatments After Discharge: ***    Physician Certification: I certify the above information and transfer of Alberto Garay  is necessary for the continuing treatment of the diagnosis listed and that he requires {Admit to Appropriate Level of Care:36081} for {GREATER/LESS:842006304} 30 days.      Update Admission H&P: {CHP DME Changes in CWSTN:885441166}    PHYSICIAN SIGNATURE:  {Esignature:129968008}

## 2021-11-28 NOTE — PROGRESS NOTES
Assessment completed this shift. Responds to voice. Oriented to person,place and knows month and year. Uncertain of why he is in hospital. Falls back to sleep easily. Emtied martinez of 150 mL clear dark yellow urine. Denies pain or nausea at present.   Electronically signed by Gabriele Dawson RN on 11/28/2021 at 9:14 AM

## 2021-11-28 NOTE — PROGRESS NOTES
Reviewed d/c instructions with daughter. Verbalized understanding. Called in medications( Pepcid, Coreg, and Lasix) to [facility] per Dr Mariluz Laboy. Danita Yung from Hospice called to say that equipment was being delivered at 1500. Scheduled p/u time pushed belkis to 1600.   Electronically signed by Perlita Turcios RN on 11/28/2021 at 3:19 PM

## 2021-11-28 NOTE — DISCHARGE SUMMARY
Hospital Medicine Discharge Summary    Anitra Anderson  :  1935  MRN:  [id number]    Admit date:  2021  Discharge date:  2021    Admitting Physician:  Meghann Mensah MD  Primary Care Physician:  Cristy Daniel DO      Discharge Diagnoses:      *Hypoglycemia.  Most likely secondary to the use of a sulfonylurea in the setting of a progressive renal failure.  Discontinued the sulfonylurea and Januvia.     *Progressive renal failure associated with the fluid overload, respiratory distress and pleural effusion.  Suspect cardiorenal syndrome. His respiratory status improved after diuresis.     *Acute/subacute chronic systolic and diastolic heart failure.    *Cardiomyopathy.  Ejection fraction 45%.     *Acute hypoxic respiratory failure secondary to fluid overload and worsening pleural effusion.     *Hyperkalemia.  Corrected.     *  Anemia, chronic, no evidence of acute blood loss.  Probably anemia of chronic disease secondary to progressive renal failure.      *Functional disability.  Patient is unable to sit up or stand up on his own.     *Paroxysmal A. fib.  Patient is on amiodarone and Eliquis. *Bradycardia, improved after holding his amiodarone and Coreg. Heart rate now is up to the 70s. Resume amiodarone at lower dose, only 200 mg daily. Resume Coreg at lower dose only 6.25 twice a day.     PPS 20%. THORNTON score 0/6. Fast score is 7E. ECOG score is 4.     On admission the patient declared that he does not want any aggressive medical care. He does not want any hemodialysis. He wants hospice. Multiple conversation took place between patient, family, myself and hospice team.  All in agreement to respect his wishes and not put him through any additional acute medical care. His family stated that patient has been a [facility]  for his entire life and that he is ready for the [employer].   Patient and family members are comfortable with his decision not to proceed with any additional acute medical care. Hospital Course:   Cassi Nelson is a 81 y.o. male that was admitted and treated at [facility] for the aforementioned medical problems. All of his acute medical issues have stabilized. Patient will be discharged home with hospice. Assuming that his volume status is fairly managed at home. The patient may have sometime to live. Under [facility] status, I anticipate that the patient will have gradual decline and deterioration of condition over the next several weeks to months. Patient was seen by the following consultants while admitted to [facility]:   Consults:  Abdelrahman Rice  IP CONSULT TO HOSPICE    Significant Diagnostic Studies:    CT Head WO Contrast    Result Date: 11/25/2021  EXAMINATION:  CT HEAD WO CONTRAST HISTORY:  Altered mental status TECHNIQUE:  Serial axial images without IV contrast were obtained from the vertex to the foramen magnum. Sagittal and coronal reconstructions. All CT scans at this facility use dose modulation, iterative reconstruction, and/or weight based dosing when appropriate to reduce radiation dose to as low as reasonably achievable. COMPARISON:  MRI brain 11/3/2021 RESULT: Acute change:   No evidence of an acute infarct or other acute parenchymal process. Hemorrhage:    No evidence of acute intracranial hemorrhage. Mass Lesion / Mass Effect:   There is no evidence of an intracranial mass or extraaxial fluid collection. No significant mass effect. Chronic change:   Patchy foci of  low attenuation coefficient are present within the supratentorial white matter which is a nonspecific finding but likely represents moderate microvascular ischemia. 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 Parenchyma: There is moderate generalized volume loss.   Ventricles:   Ventricular enlargement concordant with the degree of parenchymal volume loss. Paranasal sinuses and skull base: There is partial opacification of the right maxillary sinus. Mastoid air cells are clear. The skull base is unremarkable. Soft tissues unremarkable. No acute intracranial process or significant interval change from prior. Chronic microvascular ischemic changes and remote infarcts. XR CHEST PORTABLE    Result Date: 11/25/2021  EXAMINATION:  CHEST RADIOGRAPH (PORTABLE SINGLE AP VIEW) Exam Date/Time:  11/25/2021 8:06 AM Clinical History:   Hypoglycemia Comparison:  Chest radiograph 11/20/2021  FINDINGS: Lines, tubes, and devices:  Sternotomy wires. Cardiomediastinal silhouette:  Stable cardiomegaly. There is central venous congestion. Lungs and pleura: There are bibasilar predominant parenchymal opacities greater on the left. Bilateral pleural effusions, small right and moderate left. No pneumothorax. Cardiomegaly with pulmonary edema and bilateral pleural effusions. US RETROPERITONEAL LIMITED    Result Date: 11/26/2021  EXAMINATION: US RETROPERITONEAL LIMITED CLINICAL HISTORY: ACUTE ON CHRONIC RENAL FAILURE. ACUTE KIDNEY INJURY. FINDINGS: Right kidney measures 10.3 x 5.6 x 4.5 cm. Left kidney measures 9.8 x 5.3 x 5.8 cm. Color flow without anomaly bilaterally. No hydronephrosis and no calculi bilaterally. 3.6 x 3.2 x 2.5 cm cyst, cortex midpole right kidney. 1.8 x 2.2 x 2.2 cm cyst mid to lower pole, right kidney. RIGHT RENAL CYSTS. Discharge Medications:    @DISCHARGEMEDSLIST(<NOROUTINE> error)@    Disposition:   Discharged to Hospice at  Home. Any [facility] needs that were indicated and/or required as been addressed and set up by Social Work. Condition at discharge: Pt was medically stable at the time of discharge. Significant improvement in clinical condition compared to initial condition at presentation to hospital    Activity: activity as tolerated, fall precautions. Total time taken for discharging this patient: 40 minutes.  Greater than 70% of time was spent focused exclusively on this patient. Time was taken to review chart, discuss plans with consultants, reconciling medications, discussing plan answering questions with patient. Yajaira Mendoza MD  11/28/2021, 10:01 AM  ----------------------------------------------------------------------------------------------------------------------    Dina Freire,     Please return to ER or call 911 if you develop any significant signs or symptoms. I may not have addressed all of your medical illnesses or the abnormal blood work or imaging therefore please ask your PCP Oren Sampson DO and other out patient specialists and providers  to obtain [phone] [facility] entirely to follow up on all of the abnormal labs, imaging and findings that I have and have not addressed during your hospitalization. Discharging you from the hospital does not mean that your medical care ends here and now. You may still need additional work up, investigation, monitoring, and treatment to be handled from this point on by outside providers including your PCP, Oren Sampson DO , Specialists and other healthcare providers. Please review your list of discharge medications prior to resuming medications you might still have at home, as the medications you need to be taking, dosages or how often you must take them may have changed. For medication questions, contact your retail pharmacy and your PCP, Oren Sampson DO .     ** I STRONGLY RECOMMEND that you follow up with Oren Sampson DO within 3 to 5 days for a post hospitalization evaluation. This specific office visit is covered by your insurance, and is not the same as your annual doctor visit/ check up. This office visit is important, as it may prevent need for repeat and/or future hospitalizations. **    Your medical team at [facility]) appreciates the opportunity to work with you to get well!     Sincerely,  Emma Gonzalez MD Follow up with Dr. Oren Sampson, DO in the next 7 days or sooner if needed. Please return to ER or call 911 if you develop any significant signs or symptoms. I may or may not have addressed all of your symptoms, medical issues,  Illnesses, or all of the abnormal blood work or imaging therefore please ask your PCP and other specialists to obtain [facility] record entirely to follow up on all of your symptoms, illnesses, abnormal labs, imaging and findings that I have and have not addressed during your hospitalization. Discharging you from the hospital does not mean that your medical care ends here and now. You may still need to have additional work up, investigation, monitoring, surveillance, and treatment to be handled from this point on by in the out patient setting by  out patient providers including your PCP, Specialists and other healthcare providers. For medication questions, contact your retail pharmacy and your PCP. Your medical team at [facility] appreciates the opportunity to work with you to get well!     Elvin Ortez MD

## 2021-11-29 LAB
IRON SATURATION: 27 % (ref 11–46)
IRON: 53 UG/DL (ref 59–158)
TOTAL IRON BINDING CAPACITY: 196 UG/DL (ref 178–450)

## 2021-11-29 NOTE — PROGRESS NOTES
Physical Therapy  Facility/Department: [facility] SURG M177/T364-11  Physical Therapy Discharge      NAME: Jacqueline Mauricio    : 1935 (71 y.o.)  MRN: [id number]    Account: [de-identified]  Gender: male      Patient has been discharged from acute care hospital. DC patient from current PT program.      Electronically signed by Rachel Mejía PT on 21 at 12:35 PM EST

## 2021-11-30 LAB — BLOOD CULTURE, ROUTINE: NORMAL

## 2021-12-01 LAB — CULTURE, BLOOD 2: NORMAL

## 2023-04-03 NOTE — PROGRESS NOTES
Renal Progress Note    Assessment and Plan:   42-year-old man with CKD stage IV with a baseline creatinine in the threes. Risk factors of coronary artery disease with history of bypass surgery, ejection fraction of 45%, diabetes mellitus and hypertension. Imaging of kidneys show 11.5 and 12.2 cm kidneys without any signs of hydronephrosis. Does have bilateral renal cysts. Urinalysis before with nephrotic range proteinuria. On Lasix 40 mg IV daily for fluid overload with some mild acute renal failure. Sees Dr. Berna Mcintyre as outpt      - has slight drop in GFR so will stop lasix for now  - high risk of progression - discussed possible dialysis. He said he made up decision a while ago he wouldn't do dialysis if needed  - retacrit for anemia        Patient Active Problem List:     CHF (congestive heart failure), NYHA class I, acute on chronic, combined ([facility] [address]     Esophageal dysphagia      Subjective:   Admit Date: 10/27/2021    Interval History: gfr worse. Some sob.   No cp.  tired      Medications:   Scheduled Meds:   apixaban  2.5 mg Oral BID    furosemide  40 mg Oral Daily    amiodarone  200 mg Oral BID    atorvastatin  20 mg Oral Nightly    carvedilol  12.5 mg Oral BID    digoxin  125 mcg IntraVENous Q6H    digoxin  125 mcg Oral Every Other Day    sodium chloride flush  5-40 mL IntraVENous 2 times per day    hydrALAZINE  100 mg Oral 2 times per day    aspirin  81 mg Oral Daily    sodium chloride flush  5-40 mL IntraVENous 2 times per day    insulin glargine  0.15 Units/kg SubCUTAneous Nightly    insulin lispro  0-12 Units SubCUTAneous TID WC    insulin lispro  0-6 Units SubCUTAneous Nightly     Continuous Infusions:   dilTIAZem (CARDIZEM) 125 mg in dextrose 5% 125 mL infusion Stopped (10/31/21 0830)    sodium chloride      sodium chloride      dextrose         CBC:   Recent Labs     10/30/21  0903 10/31/21  0747   WBC 6.3 6.6   HGB 9.5* 8.8*    174     CMP:    Recent Labs 10/29/21  0656 10/30/21  0903 10/31/21  0747   * 136 138   K 4.5 4.0 4.1   * 101 102   CO2 26 26 25   BUN 32* 33* 36*   CREATININE 4.00* 4.26* 4.57*   GLUCOSE 146* 203* 154*   CALCIUM 8.5 8.0* 8.1*   LABGLOM 14.3* 13.3* 12.3*     Troponin:   Recent Labs     10/30/21  1341   TROPONINI 0.100*     BNP: No results for input(s): BNP in the last 72 hours. INR: No results for input(s): INR in the last 72 hours. Lipids: No results for input(s): CHOL, LDLDIRECT, TRIG, HDL, AMYLASE, LIPASE in the last 72 hours. Liver:   No results for input(s): AST, ALT, ALKPHOS, PROT, LABALBU, BILITOT in the last 72 hours. Invalid input(s): BILDIR  Iron:  No results for input(s): IRONS, FERRITIN in the last 72 hours. Invalid input(s): LABIRONS  Urinalysis: No results for input(s): UA in the last 72 hours. Objective:   Vitals: BP (!) 149/98   Pulse 99   Temp 100.4 °F (38 °C)   Resp 18   Ht 5' 9\" (1.753 m)   Wt 216 lb (98 kg)   SpO2 92%   BMI 31.90 kg/m²    Wt Readings from Last 3 Encounters:   10/29/21 216 lb (98 kg)      24HR INTAKE/OUTPUT:      Intake/Output Summary (Last 24 hours) at 10/31/2021 1141  Last data filed at 10/31/2021 0830  Gross per 24 hour   Intake    Output 150 ml   Net -150 ml       Constitutional:  Alert, awake, no apparent distress   Skin:normal, no rash  HEENT:sclera anicteric.   Head atraumatic normocephalic  Neck:supple with no thyromegally  Cardiovascular:  S1, S2 without m/r/g   Respiratory:  CTA B without w/r/r   Abdomen: +bs, soft, nt  Ext: 1+ LE edema  Musculoskeletal:Intact  Neuro:Alert and oriented with no deficit      Electronically signed by Martir Barillas MD on 10/31/2021 at 11:41 AM Seizure disorder Chronic atrial fibrillation

## (undated) DEVICE — TUBE SET 96 MM 64 MM H2O PERISTALTIC STD AUX CHANNEL

## (undated) DEVICE — Device: Brand: ENDO SMARTCAP

## (undated) DEVICE — TUBING, SUCTION, 1/4" X 10', STRAIGHT: Brand: MEDLINE

## (undated) DEVICE — SINGLE PORT MANIFOLD: Brand: NEPTUNE 2

## (undated) DEVICE — ELECTRODE PT RET AD L9FT HI MOIST COND ADH HYDRGEL CORDED

## (undated) DEVICE — BRUSH ENDO CLN L90.5IN SHTH DIA1.7MM BRIST DIA5-7MM 2-6MM

## (undated) DEVICE — ENDO CARRY-ON PROCEDURE KIT: Brand: ENDO CARRY-ON PROCEDURE KIT

## (undated) DEVICE — SNARE ENDOSCP AD L240CM LOOP W10MM SHTH DIA2.4MM RND INSUL

## (undated) DEVICE — GLOVE ORTHO 8   MSG9480

## (undated) DEVICE — TRAP POLYP BALEEN

## (undated) DEVICE — CONMED SCOPE SAVER BITE BLOCK, 20X27 MM: Brand: SCOPE SAVER

## (undated) DEVICE — GLOVE ORANGE PI 8 1/2   MSG9085